# Patient Record
Sex: FEMALE | Race: WHITE | NOT HISPANIC OR LATINO | Employment: FULL TIME | URBAN - NONMETROPOLITAN AREA
[De-identification: names, ages, dates, MRNs, and addresses within clinical notes are randomized per-mention and may not be internally consistent; named-entity substitution may affect disease eponyms.]

---

## 2020-04-30 ENCOUNTER — HOSPITAL ENCOUNTER (INPATIENT)
Facility: HOSPITAL | Age: 35
LOS: 2 days | Discharge: HOME | DRG: 080 | End: 2020-05-02
Attending: EMERGENCY MEDICINE | Admitting: FAMILY MEDICINE
Payer: OTHER MISCELLANEOUS

## 2020-04-30 ENCOUNTER — APPOINTMENT (EMERGENCY)
Dept: CT IMAGING | Facility: HOSPITAL | Age: 35
DRG: 080 | End: 2020-04-30
Payer: OTHER MISCELLANEOUS

## 2020-04-30 DIAGNOSIS — J18.9 MULTIFOCAL PNEUMONIA: Primary | ICD-10-CM

## 2020-04-30 DIAGNOSIS — U07.1 COVID-19 VIRUS INFECTION: ICD-10-CM

## 2020-04-30 DIAGNOSIS — J45.20 MILD INTERMITTENT ASTHMA WITHOUT COMPLICATION: ICD-10-CM

## 2020-04-30 DIAGNOSIS — U07.1 PNEUMONIA DUE TO COVID-19 VIRUS: ICD-10-CM

## 2020-04-30 DIAGNOSIS — R06.02 SOB (SHORTNESS OF BREATH): ICD-10-CM

## 2020-04-30 DIAGNOSIS — J12.82 PNEUMONIA DUE TO COVID-19 VIRUS: ICD-10-CM

## 2020-04-30 PROBLEM — E28.2 PCOS (POLYCYSTIC OVARIAN SYNDROME): Status: ACTIVE | Noted: 2020-04-30

## 2020-04-30 PROBLEM — E66.01 MORBID OBESITY (HCC): Status: ACTIVE | Noted: 2020-04-30

## 2020-04-30 PROBLEM — Z86.711 HISTORY OF PULMONARY EMBOLISM: Status: ACTIVE | Noted: 2020-04-30

## 2020-04-30 PROBLEM — I10 ESSENTIAL HYPERTENSION: Status: ACTIVE | Noted: 2020-04-30

## 2020-04-30 LAB
ABO GROUP BLD: NORMAL
ABO GROUP BLD: NORMAL
ALBUMIN SERPL BCP-MCNC: 3.4 G/DL (ref 3.5–5)
ALP SERPL-CCNC: 58 U/L (ref 46–116)
ALT SERPL W P-5'-P-CCNC: 41 U/L (ref 12–78)
ANION GAP SERPL CALCULATED.3IONS-SCNC: 8 MMOL/L (ref 4–13)
AST SERPL W P-5'-P-CCNC: 34 U/L (ref 5–45)
ATRIAL RATE: 92 BPM
BASE EX.OXY STD BLDV CALC-SCNC: 57.6 % (ref 60–80)
BASE EXCESS BLDV CALC-SCNC: -0.8 MMOL/L
BASOPHILS # BLD MANUAL: 0 THOUSAND/UL (ref 0–0.1)
BASOPHILS NFR MAR MANUAL: 0 % (ref 0–1)
BILIRUB SERPL-MCNC: 0.18 MG/DL (ref 0.2–1)
BLD GP AB SCN SERPL QL: NEGATIVE
BUN SERPL-MCNC: 10 MG/DL (ref 5–25)
CALCIUM SERPL-MCNC: 8.3 MG/DL (ref 8.3–10.1)
CHLORIDE SERPL-SCNC: 103 MMOL/L (ref 100–108)
CO2 SERPL-SCNC: 28 MMOL/L (ref 21–32)
CREAT SERPL-MCNC: 1.04 MG/DL (ref 0.6–1.3)
D DIMER PPP FEU-MCNC: 0.6 UG/ML FEU
EOSINOPHIL # BLD MANUAL: 0.05 THOUSAND/UL (ref 0–0.4)
EOSINOPHIL NFR BLD MANUAL: 1 % (ref 0–6)
ERYTHROCYTE [DISTWIDTH] IN BLOOD BY AUTOMATED COUNT: 13.6 % (ref 11.6–15.1)
GFR SERPL CREATININE-BSD FRML MDRD: 70 ML/MIN/1.73SQ M
GLUCOSE SERPL-MCNC: 84 MG/DL (ref 65–140)
HCG SERPL QL: NEGATIVE
HCO3 BLDV-SCNC: 24.2 MMOL/L (ref 24–30)
HCT VFR BLD AUTO: 47.7 % (ref 34.8–46.1)
HGB BLD-MCNC: 15.4 G/DL (ref 11.5–15.4)
LYMPHOCYTES # BLD AUTO: 2.04 THOUSAND/UL (ref 0.6–4.47)
LYMPHOCYTES # BLD AUTO: 40 % (ref 14–44)
MAGNESIUM SERPL-MCNC: 2.1 MG/DL (ref 1.6–2.6)
MCH RBC QN AUTO: 26.8 PG (ref 26.8–34.3)
MCHC RBC AUTO-ENTMCNC: 32.3 G/DL (ref 31.4–37.4)
MCV RBC AUTO: 83 FL (ref 82–98)
MONOCYTES # BLD AUTO: 0.51 THOUSAND/UL (ref 0–1.22)
MONOCYTES NFR BLD: 10 % (ref 4–12)
NEUTROPHILS # BLD MANUAL: 1.93 THOUSAND/UL (ref 1.85–7.62)
NEUTS SEG NFR BLD AUTO: 38 % (ref 43–75)
NRBC BLD AUTO-RTO: 0 /100 WBCS
O2 CT BLDV-SCNC: 12.9 ML/DL
P AXIS: 49 DEGREES
PCO2 BLDV: 41.5 MM HG (ref 42–50)
PH BLDV: 7.38 [PH] (ref 7.3–7.4)
PLATELET # BLD AUTO: 238 THOUSANDS/UL (ref 149–390)
PLATELET BLD QL SMEAR: ADEQUATE
PMV BLD AUTO: 9.7 FL (ref 8.9–12.7)
PO2 BLDV: 29.7 MM HG (ref 35–45)
POTASSIUM SERPL-SCNC: 3.9 MMOL/L (ref 3.5–5.3)
PR INTERVAL: 134 MS
PROT SERPL-MCNC: 8.2 G/DL (ref 6.4–8.2)
QRS AXIS: 20 DEGREES
QRSD INTERVAL: 84 MS
QT INTERVAL: 388 MS
QTC INTERVAL: 479 MS
RBC # BLD AUTO: 5.74 MILLION/UL (ref 3.81–5.12)
RBC MORPH BLD: NORMAL
RH BLD: POSITIVE
RH BLD: POSITIVE
SODIUM SERPL-SCNC: 139 MMOL/L (ref 136–145)
SPECIMEN EXPIRATION DATE: NORMAL
T WAVE AXIS: 18 DEGREES
TOTAL CELLS COUNTED SPEC: 100
TROPONIN I SERPL-MCNC: <0.02 NG/ML
VARIANT LYMPHS # BLD AUTO: 11 %
VENTRICULAR RATE: 92 BPM
WBC # BLD AUTO: 5.09 THOUSAND/UL (ref 4.31–10.16)

## 2020-04-30 PROCEDURE — 83520 IMMUNOASSAY QUANT NOS NONAB: CPT | Performed by: FAMILY MEDICINE

## 2020-04-30 PROCEDURE — 84145 PROCALCITONIN (PCT): CPT | Performed by: FAMILY MEDICINE

## 2020-04-30 PROCEDURE — 85027 COMPLETE CBC AUTOMATED: CPT | Performed by: PHYSICIAN ASSISTANT

## 2020-04-30 PROCEDURE — 84484 ASSAY OF TROPONIN QUANT: CPT | Performed by: PHYSICIAN ASSISTANT

## 2020-04-30 PROCEDURE — 86900 BLOOD TYPING SEROLOGIC ABO: CPT | Performed by: FAMILY MEDICINE

## 2020-04-30 PROCEDURE — 87040 BLOOD CULTURE FOR BACTERIA: CPT | Performed by: FAMILY MEDICINE

## 2020-04-30 PROCEDURE — 99285 EMERGENCY DEPT VISIT HI MDM: CPT

## 2020-04-30 PROCEDURE — 94760 N-INVAS EAR/PLS OXIMETRY 1: CPT

## 2020-04-30 PROCEDURE — 85007 BL SMEAR W/DIFF WBC COUNT: CPT | Performed by: PHYSICIAN ASSISTANT

## 2020-04-30 PROCEDURE — 93005 ELECTROCARDIOGRAM TRACING: CPT

## 2020-04-30 PROCEDURE — 71275 CT ANGIOGRAPHY CHEST: CPT

## 2020-04-30 PROCEDURE — 96360 HYDRATION IV INFUSION INIT: CPT

## 2020-04-30 PROCEDURE — 99223 1ST HOSP IP/OBS HIGH 75: CPT | Performed by: FAMILY MEDICINE

## 2020-04-30 PROCEDURE — 87449 NOS EACH ORGANISM AG IA: CPT | Performed by: FAMILY MEDICINE

## 2020-04-30 PROCEDURE — 82805 BLOOD GASES W/O2 SATURATION: CPT | Performed by: PHYSICIAN ASSISTANT

## 2020-04-30 PROCEDURE — 99285 EMERGENCY DEPT VISIT HI MDM: CPT | Performed by: PHYSICIAN ASSISTANT

## 2020-04-30 PROCEDURE — 93010 ELECTROCARDIOGRAM REPORT: CPT | Performed by: INTERNAL MEDICINE

## 2020-04-30 PROCEDURE — 85379 FIBRIN DEGRADATION QUANT: CPT | Performed by: PHYSICIAN ASSISTANT

## 2020-04-30 PROCEDURE — 86850 RBC ANTIBODY SCREEN: CPT | Performed by: FAMILY MEDICINE

## 2020-04-30 PROCEDURE — 80053 COMPREHEN METABOLIC PANEL: CPT | Performed by: PHYSICIAN ASSISTANT

## 2020-04-30 PROCEDURE — 84703 CHORIONIC GONADOTROPIN ASSAY: CPT | Performed by: PHYSICIAN ASSISTANT

## 2020-04-30 PROCEDURE — 36415 COLL VENOUS BLD VENIPUNCTURE: CPT | Performed by: PHYSICIAN ASSISTANT

## 2020-04-30 PROCEDURE — 94640 AIRWAY INHALATION TREATMENT: CPT

## 2020-04-30 PROCEDURE — 83735 ASSAY OF MAGNESIUM: CPT | Performed by: FAMILY MEDICINE

## 2020-04-30 PROCEDURE — 86901 BLOOD TYPING SEROLOGIC RH(D): CPT | Performed by: FAMILY MEDICINE

## 2020-04-30 RX ORDER — ALBUTEROL SULFATE 90 UG/1
2 AEROSOL, METERED RESPIRATORY (INHALATION) EVERY 6 HOURS PRN
Status: DISCONTINUED | OUTPATIENT
Start: 2020-04-30 | End: 2020-04-30

## 2020-04-30 RX ORDER — OMEGA-3S/DHA/EPA/FISH OIL/D3 300MG-1000
400 CAPSULE ORAL DAILY
Status: DISCONTINUED | OUTPATIENT
Start: 2020-05-01 | End: 2020-04-30

## 2020-04-30 RX ORDER — HYDROXYCHLOROQUINE SULFATE 200 MG/1
200 TABLET, FILM COATED ORAL EVERY 12 HOURS
Status: DISCONTINUED | OUTPATIENT
Start: 2020-05-01 | End: 2020-05-02 | Stop reason: HOSPADM

## 2020-04-30 RX ORDER — MELATONIN
2000 DAILY
Status: DISCONTINUED | OUTPATIENT
Start: 2020-05-01 | End: 2020-05-02 | Stop reason: HOSPADM

## 2020-04-30 RX ORDER — GUAIFENESIN 600 MG
600 TABLET, EXTENDED RELEASE 12 HR ORAL 2 TIMES DAILY
Status: DISCONTINUED | OUTPATIENT
Start: 2020-04-30 | End: 2020-05-02 | Stop reason: HOSPADM

## 2020-04-30 RX ORDER — ONDANSETRON 2 MG/ML
4 INJECTION INTRAMUSCULAR; INTRAVENOUS EVERY 6 HOURS PRN
Status: DISCONTINUED | OUTPATIENT
Start: 2020-04-30 | End: 2020-05-02 | Stop reason: HOSPADM

## 2020-04-30 RX ORDER — HYDROXYZINE HYDROCHLORIDE 25 MG/1
TABLET, FILM COATED ORAL
COMMUNITY
Start: 2011-03-01

## 2020-04-30 RX ORDER — MAGNESIUM HYDROXIDE/ALUMINUM HYDROXICE/SIMETHICONE 120; 1200; 1200 MG/30ML; MG/30ML; MG/30ML
30 SUSPENSION ORAL EVERY 6 HOURS PRN
Status: DISCONTINUED | OUTPATIENT
Start: 2020-04-30 | End: 2020-05-02 | Stop reason: HOSPADM

## 2020-04-30 RX ORDER — DOXYCYCLINE HYCLATE 100 MG/1
100 CAPSULE ORAL EVERY 12 HOURS SCHEDULED
Status: DISCONTINUED | OUTPATIENT
Start: 2020-05-01 | End: 2020-05-01

## 2020-04-30 RX ORDER — CEFTRIAXONE 2 G/50ML
2000 INJECTION, SOLUTION INTRAVENOUS ONCE
Status: COMPLETED | OUTPATIENT
Start: 2020-04-30 | End: 2020-04-30

## 2020-04-30 RX ORDER — CLINDAMYCIN PHOSPHATE 10 UG/ML
LOTION TOPICAL
COMMUNITY
Start: 2019-09-03 | End: 2020-05-02 | Stop reason: HOSPADM

## 2020-04-30 RX ORDER — ALBUTEROL SULFATE 90 UG/1
2 AEROSOL, METERED RESPIRATORY (INHALATION) EVERY 4 HOURS PRN
Status: DISCONTINUED | OUTPATIENT
Start: 2020-04-30 | End: 2020-05-02 | Stop reason: HOSPADM

## 2020-04-30 RX ORDER — SENNOSIDES 8.6 MG
1 TABLET ORAL DAILY
Status: DISCONTINUED | OUTPATIENT
Start: 2020-05-01 | End: 2020-05-02 | Stop reason: HOSPADM

## 2020-04-30 RX ORDER — ALBUTEROL SULFATE 90 UG/1
AEROSOL, METERED RESPIRATORY (INHALATION)
Status: ON HOLD | COMMUNITY
Start: 2016-04-11 | End: 2020-05-02 | Stop reason: SDUPTHER

## 2020-04-30 RX ORDER — CEFTRIAXONE 1 G/50ML
1000 INJECTION, SOLUTION INTRAVENOUS EVERY 24 HOURS
Status: DISCONTINUED | OUTPATIENT
Start: 2020-05-01 | End: 2020-05-01

## 2020-04-30 RX ORDER — LISINOPRIL 10 MG/1
TABLET ORAL
COMMUNITY
Start: 2011-03-01

## 2020-04-30 RX ORDER — FLUOXETINE 20 MG/1
10 TABLET, FILM COATED ORAL
COMMUNITY
Start: 2019-08-23

## 2020-04-30 RX ORDER — IPRATROPIUM BROMIDE AND ALBUTEROL SULFATE 2.5; .5 MG/3ML; MG/3ML
3 SOLUTION RESPIRATORY (INHALATION) ONCE
Status: COMPLETED | OUTPATIENT
Start: 2020-04-30 | End: 2020-04-30

## 2020-04-30 RX ORDER — LISINOPRIL 10 MG/1
10 TABLET ORAL DAILY
Status: DISCONTINUED | OUTPATIENT
Start: 2020-05-01 | End: 2020-05-02 | Stop reason: HOSPADM

## 2020-04-30 RX ORDER — DOCUSATE SODIUM 100 MG/1
100 CAPSULE, LIQUID FILLED ORAL 2 TIMES DAILY
Status: DISCONTINUED | OUTPATIENT
Start: 2020-04-30 | End: 2020-05-02 | Stop reason: HOSPADM

## 2020-04-30 RX ORDER — AMITRIPTYLINE HYDROCHLORIDE 25 MG/1
TABLET, FILM COATED ORAL
COMMUNITY
Start: 2019-08-23

## 2020-04-30 RX ORDER — ACETAMINOPHEN 325 MG/1
650 TABLET ORAL EVERY 6 HOURS PRN
Status: DISCONTINUED | OUTPATIENT
Start: 2020-04-30 | End: 2020-05-02 | Stop reason: HOSPADM

## 2020-04-30 RX ORDER — HYDROXYCHLOROQUINE SULFATE 200 MG/1
800 TABLET, FILM COATED ORAL 2 TIMES DAILY
Status: COMPLETED | OUTPATIENT
Start: 2020-04-30 | End: 2020-05-01

## 2020-04-30 RX ADMIN — IOHEXOL 85 ML: 350 INJECTION, SOLUTION INTRAVENOUS at 15:18

## 2020-04-30 RX ADMIN — HYDROXYCHLOROQUINE SULFATE 800 MG: 200 TABLET, FILM COATED ORAL at 20:04

## 2020-04-30 RX ADMIN — ALBUTEROL SULFATE 2 PUFF: 90 AEROSOL, METERED RESPIRATORY (INHALATION) at 21:54

## 2020-04-30 RX ADMIN — IPRATROPIUM BROMIDE AND ALBUTEROL SULFATE 3 ML: 2.5; .5 SOLUTION RESPIRATORY (INHALATION) at 14:31

## 2020-04-30 RX ADMIN — GUAIFENESIN 600 MG: 600 TABLET, EXTENDED RELEASE ORAL at 20:05

## 2020-04-30 RX ADMIN — DOXYCYCLINE 100 MG: 100 INJECTION, POWDER, LYOPHILIZED, FOR SOLUTION INTRAVENOUS at 16:37

## 2020-04-30 RX ADMIN — SODIUM CHLORIDE 1000 ML: 0.9 INJECTION, SOLUTION INTRAVENOUS at 14:22

## 2020-04-30 RX ADMIN — CEFTRIAXONE 2000 MG: 2 INJECTION, SOLUTION INTRAVENOUS at 15:59

## 2020-05-01 LAB
ALBUMIN SERPL BCP-MCNC: 3.1 G/DL (ref 3.5–5)
ALP SERPL-CCNC: 52 U/L (ref 46–116)
ALT SERPL W P-5'-P-CCNC: 36 U/L (ref 12–78)
ANION GAP SERPL CALCULATED.3IONS-SCNC: 10 MMOL/L (ref 4–13)
AST SERPL W P-5'-P-CCNC: 34 U/L (ref 5–45)
BILIRUB SERPL-MCNC: 0.21 MG/DL (ref 0.2–1)
BUN SERPL-MCNC: 9 MG/DL (ref 5–25)
CALCIUM SERPL-MCNC: 7.9 MG/DL (ref 8.3–10.1)
CHLORIDE SERPL-SCNC: 104 MMOL/L (ref 100–108)
CO2 SERPL-SCNC: 25 MMOL/L (ref 21–32)
CREAT SERPL-MCNC: 1.02 MG/DL (ref 0.6–1.3)
ERYTHROCYTE [DISTWIDTH] IN BLOOD BY AUTOMATED COUNT: 13.5 % (ref 11.6–15.1)
GFR SERPL CREATININE-BSD FRML MDRD: 71 ML/MIN/1.73SQ M
GLUCOSE SERPL-MCNC: 69 MG/DL (ref 65–140)
GLUCOSE SERPL-MCNC: 75 MG/DL (ref 65–140)
HCT VFR BLD AUTO: 44.8 % (ref 34.8–46.1)
HGB BLD-MCNC: 14.5 G/DL (ref 11.5–15.4)
L PNEUMO1 AG UR QL IA.RAPID: NEGATIVE
MCH RBC QN AUTO: 27 PG (ref 26.8–34.3)
MCHC RBC AUTO-ENTMCNC: 32.4 G/DL (ref 31.4–37.4)
MCV RBC AUTO: 83 FL (ref 82–98)
NRBC BLD AUTO-RTO: 0 /100 WBCS
PLATELET # BLD AUTO: 223 THOUSANDS/UL (ref 149–390)
PMV BLD AUTO: 9.5 FL (ref 8.9–12.7)
POTASSIUM SERPL-SCNC: 3.5 MMOL/L (ref 3.5–5.3)
PROCALCITONIN SERPL-MCNC: <0.05 NG/ML
PROCALCITONIN SERPL-MCNC: <0.05 NG/ML
PROT SERPL-MCNC: 7.4 G/DL (ref 6.4–8.2)
RBC # BLD AUTO: 5.37 MILLION/UL (ref 3.81–5.12)
S PNEUM AG UR QL: NEGATIVE
SODIUM SERPL-SCNC: 139 MMOL/L (ref 136–145)
WBC # BLD AUTO: 4.96 THOUSAND/UL (ref 4.31–10.16)

## 2020-05-01 PROCEDURE — 80053 COMPREHEN METABOLIC PANEL: CPT | Performed by: FAMILY MEDICINE

## 2020-05-01 PROCEDURE — 94760 N-INVAS EAR/PLS OXIMETRY 1: CPT

## 2020-05-01 PROCEDURE — 82948 REAGENT STRIP/BLOOD GLUCOSE: CPT

## 2020-05-01 PROCEDURE — 99232 SBSQ HOSP IP/OBS MODERATE 35: CPT | Performed by: FAMILY MEDICINE

## 2020-05-01 PROCEDURE — 84145 PROCALCITONIN (PCT): CPT | Performed by: FAMILY MEDICINE

## 2020-05-01 PROCEDURE — 85027 COMPLETE CBC AUTOMATED: CPT | Performed by: FAMILY MEDICINE

## 2020-05-01 RX ADMIN — ENOXAPARIN SODIUM 40 MG: 40 INJECTION SUBCUTANEOUS at 21:29

## 2020-05-01 RX ADMIN — MELATONIN 2000 UNITS: at 09:27

## 2020-05-01 RX ADMIN — ALBUTEROL SULFATE 2 PUFF: 90 AEROSOL, METERED RESPIRATORY (INHALATION) at 13:34

## 2020-05-01 RX ADMIN — GUAIFENESIN 600 MG: 600 TABLET, EXTENDED RELEASE ORAL at 17:33

## 2020-05-01 RX ADMIN — ALBUTEROL SULFATE 2 PUFF: 90 AEROSOL, METERED RESPIRATORY (INHALATION) at 04:32

## 2020-05-01 RX ADMIN — HYDROXYCHLOROQUINE SULFATE 800 MG: 200 TABLET, FILM COATED ORAL at 09:26

## 2020-05-01 RX ADMIN — LISINOPRIL 10 MG: 10 TABLET ORAL at 09:28

## 2020-05-01 RX ADMIN — ENOXAPARIN SODIUM 40 MG: 40 INJECTION SUBCUTANEOUS at 09:26

## 2020-05-01 RX ADMIN — GUAIFENESIN 600 MG: 600 TABLET, EXTENDED RELEASE ORAL at 09:28

## 2020-05-01 RX ADMIN — HYDROXYCHLOROQUINE SULFATE 200 MG: 200 TABLET, FILM COATED ORAL at 21:29

## 2020-05-01 RX ADMIN — DOXYCYCLINE 100 MG: 100 CAPSULE ORAL at 09:27

## 2020-05-02 ENCOUNTER — APPOINTMENT (INPATIENT)
Dept: RADIOLOGY | Facility: HOSPITAL | Age: 35
DRG: 080 | End: 2020-05-02
Payer: OTHER MISCELLANEOUS

## 2020-05-02 VITALS
BODY MASS INDEX: 41.02 KG/M2 | HEART RATE: 96 BPM | WEIGHT: 293 LBS | TEMPERATURE: 98.6 F | OXYGEN SATURATION: 95 % | HEIGHT: 71 IN | RESPIRATION RATE: 17 BRPM | SYSTOLIC BLOOD PRESSURE: 118 MMHG | DIASTOLIC BLOOD PRESSURE: 65 MMHG

## 2020-05-02 LAB
ALBUMIN SERPL BCP-MCNC: 3.4 G/DL (ref 3.5–5)
ALP SERPL-CCNC: 53 U/L (ref 46–116)
ALT SERPL W P-5'-P-CCNC: 40 U/L (ref 12–78)
ANION GAP SERPL CALCULATED.3IONS-SCNC: 8 MMOL/L (ref 4–13)
AST SERPL W P-5'-P-CCNC: 33 U/L (ref 5–45)
BILIRUB SERPL-MCNC: 0.27 MG/DL (ref 0.2–1)
BUN SERPL-MCNC: 10 MG/DL (ref 5–25)
CALCIUM SERPL-MCNC: 8.4 MG/DL (ref 8.3–10.1)
CHLORIDE SERPL-SCNC: 104 MMOL/L (ref 100–108)
CO2 SERPL-SCNC: 26 MMOL/L (ref 21–32)
CREAT SERPL-MCNC: 0.99 MG/DL (ref 0.6–1.3)
ERYTHROCYTE [DISTWIDTH] IN BLOOD BY AUTOMATED COUNT: 13.4 % (ref 11.6–15.1)
GFR SERPL CREATININE-BSD FRML MDRD: 74 ML/MIN/1.73SQ M
GLUCOSE SERPL-MCNC: 84 MG/DL (ref 65–140)
HCT VFR BLD AUTO: 46 % (ref 34.8–46.1)
HGB BLD-MCNC: 15.1 G/DL (ref 11.5–15.4)
MCH RBC QN AUTO: 27 PG (ref 26.8–34.3)
MCHC RBC AUTO-ENTMCNC: 32.8 G/DL (ref 31.4–37.4)
MCV RBC AUTO: 82 FL (ref 82–98)
NRBC BLD AUTO-RTO: 0 /100 WBCS
PLATELET # BLD AUTO: 243 THOUSANDS/UL (ref 149–390)
PMV BLD AUTO: 9.6 FL (ref 8.9–12.7)
POTASSIUM SERPL-SCNC: 3.7 MMOL/L (ref 3.5–5.3)
PROT SERPL-MCNC: 7.8 G/DL (ref 6.4–8.2)
RBC # BLD AUTO: 5.6 MILLION/UL (ref 3.81–5.12)
SODIUM SERPL-SCNC: 138 MMOL/L (ref 136–145)
WBC # BLD AUTO: 5.61 THOUSAND/UL (ref 4.31–10.16)

## 2020-05-02 PROCEDURE — 93005 ELECTROCARDIOGRAM TRACING: CPT

## 2020-05-02 PROCEDURE — 71045 X-RAY EXAM CHEST 1 VIEW: CPT

## 2020-05-02 PROCEDURE — 94760 N-INVAS EAR/PLS OXIMETRY 1: CPT

## 2020-05-02 PROCEDURE — 85027 COMPLETE CBC AUTOMATED: CPT | Performed by: FAMILY MEDICINE

## 2020-05-02 PROCEDURE — 99239 HOSP IP/OBS DSCHRG MGMT >30: CPT | Performed by: FAMILY MEDICINE

## 2020-05-02 PROCEDURE — 80053 COMPREHEN METABOLIC PANEL: CPT | Performed by: FAMILY MEDICINE

## 2020-05-02 RX ORDER — MELATONIN
2000 DAILY
Qty: 10 TABLET | Refills: 0 | Status: SHIPPED | OUTPATIENT
Start: 2020-05-03 | End: 2020-05-08

## 2020-05-02 RX ORDER — ALBUTEROL SULFATE 90 UG/1
2 AEROSOL, METERED RESPIRATORY (INHALATION) 4 TIMES DAILY
Status: DISCONTINUED | OUTPATIENT
Start: 2020-05-02 | End: 2020-05-02 | Stop reason: HOSPADM

## 2020-05-02 RX ORDER — GUAIFENESIN 600 MG
600 TABLET, EXTENDED RELEASE 12 HR ORAL 2 TIMES DAILY
Qty: 14 TABLET | Refills: 0 | Status: SHIPPED | OUTPATIENT
Start: 2020-05-02 | End: 2020-05-09

## 2020-05-02 RX ORDER — ALBUTEROL SULFATE 90 UG/1
2 AEROSOL, METERED RESPIRATORY (INHALATION) EVERY 4 HOURS PRN
Qty: 1 INHALER | Refills: 1 | Status: SHIPPED | OUTPATIENT
Start: 2020-05-02

## 2020-05-02 RX ADMIN — ENOXAPARIN SODIUM 40 MG: 40 INJECTION SUBCUTANEOUS at 09:16

## 2020-05-02 RX ADMIN — LISINOPRIL 10 MG: 10 TABLET ORAL at 09:07

## 2020-05-02 RX ADMIN — GUAIFENESIN 600 MG: 600 TABLET, EXTENDED RELEASE ORAL at 09:17

## 2020-05-02 RX ADMIN — MELATONIN 2000 UNITS: at 12:48

## 2020-05-02 RX ADMIN — HYDROXYCHLOROQUINE SULFATE 200 MG: 200 TABLET, FILM COATED ORAL at 09:09

## 2020-05-02 RX ADMIN — ALBUTEROL SULFATE 2 PUFF: 90 AEROSOL, METERED RESPIRATORY (INHALATION) at 12:47

## 2020-05-02 RX ADMIN — ALBUTEROL SULFATE 2 PUFF: 90 AEROSOL, METERED RESPIRATORY (INHALATION) at 09:11

## 2020-05-03 LAB — IL6 SERPL-MCNC: 5.5 PG/ML (ref 0–15.5)

## 2020-05-04 LAB
ATRIAL RATE: 73 BPM
P AXIS: 76 DEGREES
PR INTERVAL: 144 MS
QRS AXIS: 58 DEGREES
QRSD INTERVAL: 78 MS
QT INTERVAL: 402 MS
QTC INTERVAL: 442 MS
T WAVE AXIS: 73 DEGREES
VENTRICULAR RATE: 73 BPM

## 2020-05-06 LAB
BACTERIA BLD CULT: NORMAL
BACTERIA BLD CULT: NORMAL

## 2022-02-03 ENCOUNTER — NURSE TRIAGE (OUTPATIENT)
Dept: OTHER | Facility: OTHER | Age: 37
End: 2022-02-03

## 2022-02-03 DIAGNOSIS — Z20.822 SUSPECTED SEVERE ACUTE RESPIRATORY SYNDROME CORONAVIRUS 2 (SARS-COV-2) INFECTION: Primary | ICD-10-CM

## 2022-02-03 PROCEDURE — U0003 INFECTIOUS AGENT DETECTION BY NUCLEIC ACID (DNA OR RNA); SEVERE ACUTE RESPIRATORY SYNDROME CORONAVIRUS 2 (SARS-COV-2) (CORONAVIRUS DISEASE [COVID-19]), AMPLIFIED PROBE TECHNIQUE, MAKING USE OF HIGH THROUGHPUT TECHNOLOGIES AS DESCRIBED BY CMS-2020-01-R: HCPCS | Performed by: FAMILY MEDICINE

## 2022-02-03 PROCEDURE — U0005 INFEC AGEN DETEC AMPLI PROBE: HCPCS | Performed by: FAMILY MEDICINE

## 2022-02-03 NOTE — TELEPHONE ENCOUNTER
1  Were you within 6 feet or less, for up to 15 minutes or more with a person that has a confirmed COVID-19 test? Yes  2  What was the date of your exposure? 2 weeks ago  3  Are you experiencing any symptoms attributed to the virus?  (Assess for SOB, cough, fever, difficulty breathing) Sore throat, headache, onset 1/28/22  4  HIGH RISK: Do you have any history heart or lung conditions, weakened immune system, diabetes, Asthma, CHF, HIV, COPD, Chemo, renal failure, sickle cell, etc? Asthma   5  PREGNANCY: Are you pregnant or did you recently give birth? Denies   6   VACCINE: "Have you gotten the COVID-19 vaccine?" If Yes ask: "Which one, how many shots, when did you get it?" 3

## 2022-02-03 NOTE — TELEPHONE ENCOUNTER
Reason for Disposition   [1] COVID-19 infection suspected by caller or triager AND [2] mild symptoms (cough, fever, or others) AND [3] negative COVID-19 rapid test    Protocols used: CORONAVIRUS (COVID-19) DIAGNOSED OR SUSPECTED-ADULT-[place]

## 2022-02-04 LAB — SARS-COV-2 RNA RESP QL NAA+PROBE: POSITIVE

## 2022-08-17 NOTE — PROGRESS NOTES
Assessment        Diagnoses and all orders for this visit:    Encntr for gyn exam (general) (routine) w/o abn findings    Cervical cancer screening  -     Liquid-based pap, screening    PCOS (polycystic ovarian syndrome)  -     Follicle stimulating hormone; Future  -     DHEA-sulfate; Future  -     hCG, quantitative; Future  -     Prolactin; Future  -     Testosterone, free, total; Future  -     TSH, 3rd generation with Free T4 reflex; Future  -     17-Hydroxyprogesterone; Future  -     US pelvis complete w transvaginal; Future     Advised repeat workup for PCOS to confirm diagnosis  Patient also with history of  endometriosis and is complaining irregular and painful menses  Pelvic ultrasound  was ordered     She previously had a Mirena Intrauterine device that helped control her symptoms  of abnormal bleeding and pain  Discussed this may be another option until she is  ready to pursue pregnancy since return to fertility is immediate      History of pulmonary embolism  -     Thrombosis Panel; Future     Patient with history of pulmonary embolism  Patient denies a history of  thrombophilia but her father has been diagnosed with thrombotic events  Advised thrombophilia panel    Encounter for preconception consultation  -     Ambulatory Referral to Maternal Fetal Medicine; Future      Patient is considering pregnancy although without a current partner  Advised  another pre conception counseling with Maternal Fetal Medicine  She does have  risk factors such as advanced maternal age as well as history of pulmonary  embolism      Encounter for assisted reproductive fertility cycle  -     Ambulatory Referral to Infertility; Future     She is considering her options for pregnancy  She is currently without a partner     Advised patient referral to Reproductive Endocrinology and Infertility to discuss  options for pursuing pregnancy via assisted reproductive technology    Family history of breast cancer  -     Mammo diagnostic bilateral w 3d & cad; Future  -     US breast left limited (diagnostic); Future     Small breast masses were noted at 8:00 a m  Position  Advised diagnostic  imaging  Advised additional testing if indicated  We will call patient with results    Encounter for screening mammogram for breast cancer  -     Mammo diagnostic bilateral w 3d & cad; Future  -     US breast left limited (diagnostic); Future    Mass of lower inner quadrant of left breast  -     Mammo diagnostic bilateral w 3d & cad; Future  -     US breast left limited (diagnostic); Future    Other orders  -     busPIRone (BUSPAR) 10 mg tablet  -     fenofibrate (TRICOR) 145 mg tablet; Take 1 tablet by mouth daily  -     levocetirizine (XYZAL) 5 MG tablet; Daily (Patient not taking: Reported on 8/18/2022)  -     montelukast (SINGULAIR) 10 mg tablet  -     omeprazole (PriLOSEC) 40 MG capsule  -     Semaglutide-[facility]) 2 4 MG/0 75ML; 2 4 mg  -     spironolactone (ALDACTONE) 10 mg/ml SUSP oral suspension; 1 5 mg/kg  -     Topiramate (TOPAMAX PO); 50 mg 2 (two) times a day  -     loratadine (CLARITIN) 10 mg tablet; Take 10 mg by mouth daily             Plan      All questions answered  Await pap smear results  Contraception: none  Diagnosis explained in detail, including differential   Discussed healthy lifestyle modifications  Educational material distributed  Follow up in 3 weeks  Follow up as needed  Mammogram   Pelvic ultrasound  Thin prep Pap smear  Vivienne Talbot is a 44 y o  female who presents for annual exam       Chief Complaint   Patient presents with    New Patient Visit    Gynecologic Exam     Yearly exam - has hx of PCOS, endometriosis, fertility discussion, irregular periods within the past 3 months        She is not sexually active currently, no current partner  Declines STD screening    She has a h/o PCOS, insulin resistance, endometriosis ( 3 laparoscopies, due to dysmenorrhea)  Dx with labs, US, insulin resistance    Last Pap: 2 years ago [facility]   Last mammogram:   none  Colorectal cancer screening: yes, [place], 2016    HPV vaccine completed:yes   Current contraception: none  History of abnormal Pap smear: no  History of abnormal mammogram: no  Family history of uterine or ovarian cancer: no  Family history of breast cancer: yes - MGM 76s  Family history of colon cancer: no    She is on Wegovy, has lost 80+ lbs managed by [facility] [place]      Menstrual History:  OB History        0    Para   0    Term   0       0    AB   0    Living   0       SAB   0    IAB   0    Ectopic   0    Multiple   0    Live Births   0                Menarche age: 14  Patient's last menstrual period was 2022 (exact date)  Period Cycle (Days): 55  Period Pattern: (!) Irregular    She had a Mirena removed in   Periods were every 24 days, periods are now irregular    She had "bad cramps"    She has a h/o PE in  on OCP (Annell Salon), thrombophilia panel not done  She was on progesterone only, did well on Progesterone IUD    She wants to try to get pregnant        Past Medical History:   Diagnosis Date    Asthma     GERD (gastroesophageal reflux disease)     Hypertension     Migraines     PCOS (polycystic ovarian syndrome)     Pulmonary embolism ([facility] [address] ) 2009    Venous insufficiency      Past Surgical History:   Procedure Laterality Date    ADENOIDECTOMY      CHOLECYSTECTOMY      LAPAROSCOPY      TONSILLECTOMY       History reviewed  No pertinent family history      Social History     Tobacco Use    Smoking status: Never Smoker    Smokeless tobacco: Never Used   Vaping Use    Vaping Use: Never used   Substance Use Topics    Alcohol use: Not Currently    Drug use: Never          Current Outpatient Medications:     albuterol (Ventolin HFA) 90 mcg/act inhaler, Inhale 2 puffs every 4 (four) hours as needed for wheezing or shortness of breath, Disp: 1 Inhaler, Rfl: 1    busPIRone (BUSPAR) 10 mg tablet, , Disp: , Rfl:     fenofibrate (TRICOR) 145 mg tablet, Take 1 tablet by mouth daily, Disp: , Rfl:     FLUoxetine (PROzac) 20 MG tablet, 10 mg, Disp: , Rfl:     hydrOXYzine HCL (ATARAX) 25 mg tablet, Take by mouth, Disp: , Rfl:     lisinopril (ZESTRIL) 10 mg tablet, Take by mouth, Disp: , Rfl:     loratadine (CLARITIN) 10 mg tablet, Take 10 mg by mouth daily, Disp: , Rfl:     metFORMIN (GLUCOPHAGE) 1000 MG tablet, Take by mouth 2 (two) times a day with meals, Disp: , Rfl:     montelukast (SINGULAIR) 10 mg tablet, , Disp: , Rfl:     omeprazole (PriLOSEC) 40 MG capsule, , Disp: , Rfl:     Semaglutide-Weight Management [facility]) 2 4 MG/0 75ML, 2 4 mg, Disp: , Rfl:     spironolactone (ALDACTONE) 10 mg/ml SUSP oral suspension, 1 5 mg/kg, Disp: , Rfl:     Topiramate (TOPAMAX PO), 50 mg 2 (two) times a day, Disp: , Rfl:     levocetirizine (XYZAL) 5 MG tablet, Daily (Patient not taking: Reported on 8/18/2022), Disp: , Rfl:     Allergies   Allergen Reactions    Erythromycin Swelling    Ethylenediamine Dermatitis, Hives, Itching and Swelling    Propolis Dermatitis, Hives, Itching and Swelling    Triptans Palpitations     flushing           Review of Systems   Constitutional: Negative  HENT: Negative  Eyes: Negative  Respiratory: Negative  Cardiovascular: Negative  Gastrointestinal: Negative  Endocrine: Negative  Genitourinary:        As noted in HPI   Musculoskeletal: Negative  Skin: Negative  Allergic/Immunologic: Negative  Neurological: Negative  Hematological: Negative  Psychiatric/Behavioral: Negative  /82 (BP Location: Right arm, Patient Position: Sitting, Cuff Size: Adult)   Pulse 92   Ht 5' 11 5" (1 816 m)   Wt 98 9 kg (218 lb)   LMP 07/13/2022 (Exact Date)   BMI 29 98 kg/m²         Physical Exam  Constitutional:       Appearance: She is well-developed     Genitourinary:      Vulva, bladder and rectum normal       No lesions in the vagina  Genitourinary Comments:         Right Labia: No rash, tenderness, lesions, skin changes or Bartholin's cyst      Left Labia: No tenderness, lesions, skin changes, Bartholin's cyst or rash  No inguinal adenopathy present in the right or left side  No vaginal discharge, tenderness or bleeding  No vaginal prolapse present  No vaginal atrophy present  Right Adnexa: not tender, not full and no mass present  Left Adnexa: not tender, not full and no mass present  No cervical motion tenderness, friability, lesion or polyp  Uterus is not enlarged or tender  Pelvic exam was performed with patient in the lithotomy position  Rectum:      No external hemorrhoid  Breasts:      Right: No mass, nipple discharge, skin change or tenderness  Left: No mass, nipple discharge, skin change or tenderness  HENT:      Head: Normocephalic  Nose: Nose normal    Eyes:      Conjunctiva/sclera: Conjunctivae normal    Neck:      Thyroid: No thyromegaly  Cardiovascular:      Rate and Rhythm: Normal rate and regular rhythm  Heart sounds: Normal heart sounds  No murmur heard  Pulmonary:      Effort: Pulmonary effort is normal  No respiratory distress  Breath sounds: Normal breath sounds  No wheezing or rales  Abdominal:      General: There is no distension  Palpations: Abdomen is soft  There is no mass  Tenderness: There is no abdominal tenderness  There is no guarding or rebound  Musculoskeletal:         General: No tenderness  Cervical back: Neck supple  No muscular tenderness  Lymphadenopathy:      Cervical: No cervical adenopathy  Lower Body: No right inguinal adenopathy  No left inguinal adenopathy  Neurological:      Mental Status: She is alert and oriented to person, place, and time  Skin:     General: Skin is warm and dry     Psychiatric:         Mood and Affect: Mood normal  Behavior: Behavior normal

## 2022-08-17 NOTE — PATIENT INSTRUCTIONS
Wellness Visit for Adults   AMBULATORY CARE:   A wellness visit  is when you see your healthcare provider to get screened for health problems  Your healthcare provider will also give you advice on how to stay healthy  Write down your questions so you remember to ask them  Ask your healthcare provider how often you should have a wellness visit  What happens at a wellness visit:  Your healthcare provider will ask about your health, and your family history of health problems  This includes high blood pressure, heart disease, and cancer  He or she will ask if you have symptoms that concern you, if you smoke, and about your mood  You may also be asked about your intake of medicines, supplements, food, and alcohol  Any of the following may be done: Your weight  will be checked  Your height may also be checked so your body mass index (BMI) can be calculated  Your BMI shows if you are at a healthy weight  Your blood pressure  and heart rate will be checked  Your temperature may also be checked  Blood and urine tests  may be done  Blood tests may be done to check your cholesterol levels  Abnormal cholesterol levels increase your risk for heart disease and stroke  You may also need a blood or urine test to check for diabetes if you are at increased risk  Urine tests may be done to look for signs of an infection or kidney disease  A physical exam  includes checking your heartbeat and lungs with a stethoscope  Your healthcare provider may also check your skin to look for sun damage  Screening tests  may be recommended  A screening test is done to check for diseases that may not cause symptoms  The screening tests you may need depend on your age, gender, family history, and lifestyle habits  For example, colorectal screening may be recommended if you are 48years old or older  Screening tests you need if you are a woman:   A Pap smear  is used to screen for cervical cancer   Pap smears are usually done every 3 to 5 years depending on your age  You may need them more often if you have had abnormal Pap smear test results in the past  Ask your healthcare provider how often you should have a Pap smear  A mammogram  is an x-ray of your breasts to screen for breast cancer  Experts recommend mammograms every 2 years starting at age 48 years  You may need a mammogram at age 52 years or younger if you have an increased risk for breast cancer  Talk to your healthcare provider about when you should start having mammograms and how often you need them  Vaccines you may need:   Get an influenza vaccine  every year  The influenza vaccine protects you from the flu  Several types of viruses cause the flu  The viruses change over time, so new vaccines are made each year  Get a tetanus-diphtheria (Td) booster vaccine  every 10 years  This vaccine protects you against tetanus and diphtheria  Tetanus is a severe infection that may cause painful muscle spasms and lockjaw  Diphtheria is a severe bacterial infection that causes a thick covering in the back of your mouth and throat  Get a human papillomavirus (HPV) vaccine  if you are female and aged 23 to 32 or male 23 to 24 and never received it  This vaccine protects you from HPV infection  HPV is the most common infection spread by sexual contact  HPV may also cause vaginal, penile, and anal cancers  Get a pneumococcal vaccine  if you are aged 72 years or older  The pneumococcal vaccine is an injection given to protect you from pneumococcal disease  Pneumococcal disease is an infection caused by pneumococcal bacteria  The infection may cause pneumonia, meningitis, or an ear infection  Get a shingles vaccine  if you are 60 or older, even if you have had shingles before  The shingles vaccine is an injection to protect you from the varicella-zoster virus  This is the same virus that causes chickenpox   Shingles is a painful rash that develops in people who had chickenpox or have been exposed to the virus  How to eat healthy:  My Plate is a model for planning healthy meals  It shows the types and amounts of foods that should go on your plate  Fruits and vegetables make up about half of your plate, and grains and protein make up the other half  A serving of dairy is included on the side of your plate  The amount of calories and serving sizes you need depends on your age, gender, weight, and height  Examples of healthy foods are listed below:  Eat a variety of vegetables  such as dark green, red, and orange vegetables  You can also include canned vegetables low in sodium (salt) and frozen vegetables without added butter or sauces  Eat a variety of fresh fruits , canned fruit in 100% juice, frozen fruit, and dried fruit  Include whole grains  At least half of the grains you eat should be whole grains  Examples include whole-wheat bread, wheat pasta, brown rice, and whole-grain cereals such as oatmeal     Eat a variety of protein foods such as seafood (fish and shellfish), lean meat, and poultry without skin (turkey and chicken)  Examples of lean meats include pork leg, shoulder, or tenderloin, and beef round, sirloin, tenderloin, and extra lean ground beef  Other protein foods include eggs and egg substitutes, beans, peas, soy products, nuts, and seeds  Choose low-fat dairy products such as skim or 1% milk or low-fat yogurt, cheese, and cottage cheese  Limit unhealthy fats  such as butter, hard margarine, and shortening  Exercise:  Exercise at least 30 minutes per day on most days of the week  Some examples of exercise include walking, biking, dancing, and swimming  You can also fit in more physical activity by taking the stairs instead of the elevator or parking farther away from stores  Include muscle strengthening activities 2 days each week  Regular exercise provides many health benefits   It helps you manage your weight, and decreases your risk for type 2 diabetes, heart disease, stroke, and high blood pressure  Exercise can also help improve your mood  Ask your healthcare provider about the best exercise plan for you  General health and safety guidelines:   Do not smoke  Nicotine and other chemicals in cigarettes and cigars can cause lung damage  Ask your healthcare provider for information if you currently smoke and need help to quit  E-cigarettes or smokeless tobacco still contain nicotine  Talk to your healthcare provider before you use these products  Limit alcohol  A drink of alcohol is 12 ounces of beer, 5 ounces of wine, or 1½ ounces of liquor  Lose weight, if needed  Being overweight increases your risk of certain health conditions  These include heart disease, high blood pressure, type 2 diabetes, and certain types of cancer  Protect your skin  Do not sunbathe or use tanning beds  Use sunscreen with a SPF 15 or higher  Apply sunscreen at least 15 minutes before you go outside  Reapply sunscreen every 2 hours  Wear protective clothing, hats, and sunglasses when you are outside  Drive safely  Always wear your seatbelt  Make sure everyone in your car wears a seatbelt  A seatbelt can save your life if you are in an accident  Do not use your cell phone when you are driving  This could distract you and cause an accident  Pull over if you need to make a call or send a text message  Practice safe sex  Use latex condoms if are sexually active and have more than one partner  Your healthcare provider may recommend screening tests for sexually transmitted infections (STIs)  Wear helmets, lifejackets, and protective gear  Always wear a helmet when you ride a bike or motorcycle, go skiing, or play sports that could cause a head injury  Wear protective equipment when you play sports  Wear a lifejacket when you are on a boat or doing water sports      © Copyright [address] 2022 Information is for End User's use only and may not be sold, redistributed or otherwise used for commercial purposes  All illustrations and images included in CareNotes® are the copyrighted property of A D [employer] M [employer]  or [employer]  The above information is an  only  It is not intended as medical advice for individual conditions or treatments  Talk to your doctor, nurse or pharmacist before following any medical regimen to see if it is safe and effective for you

## 2022-08-18 ENCOUNTER — APPOINTMENT (OUTPATIENT)
Dept: LAB | Facility: CLINIC | Age: 37
End: 2022-08-18
Payer: COMMERCIAL

## 2022-08-18 ENCOUNTER — OFFICE VISIT (OUTPATIENT)
Dept: OBGYN CLINIC | Facility: CLINIC | Age: 37
End: 2022-08-18
Payer: COMMERCIAL

## 2022-08-18 VITALS
WEIGHT: 218 LBS | HEART RATE: 92 BPM | SYSTOLIC BLOOD PRESSURE: 126 MMHG | DIASTOLIC BLOOD PRESSURE: 82 MMHG | BODY MASS INDEX: 29.53 KG/M2 | HEIGHT: 72 IN

## 2022-08-18 DIAGNOSIS — Z31.83 ENCOUNTER FOR ASSISTED REPRODUCTIVE FERTILITY CYCLE: ICD-10-CM

## 2022-08-18 DIAGNOSIS — Z80.3 FAMILY HISTORY OF BREAST CANCER: ICD-10-CM

## 2022-08-18 DIAGNOSIS — Z31.69 ENCOUNTER FOR PRECONCEPTION CONSULTATION: ICD-10-CM

## 2022-08-18 DIAGNOSIS — Z12.4 CERVICAL CANCER SCREENING: ICD-10-CM

## 2022-08-18 DIAGNOSIS — Z01.419 ENCNTR FOR GYN EXAM (GENERAL) (ROUTINE) W/O ABN FINDINGS: Primary | ICD-10-CM

## 2022-08-18 DIAGNOSIS — Z86.711 HISTORY OF PULMONARY EMBOLISM: ICD-10-CM

## 2022-08-18 DIAGNOSIS — N63.24 MASS OF LOWER INNER QUADRANT OF LEFT BREAST: ICD-10-CM

## 2022-08-18 DIAGNOSIS — E28.2 PCOS (POLYCYSTIC OVARIAN SYNDROME): ICD-10-CM

## 2022-08-18 DIAGNOSIS — Z12.31 ENCOUNTER FOR SCREENING MAMMOGRAM FOR BREAST CANCER: ICD-10-CM

## 2022-08-18 LAB
B-HCG SERPL-ACNC: <2 MIU/ML
FSH SERPL-ACNC: 5.9 MIU/ML
PROLACTIN SERPL-MCNC: 13.6 NG/ML
TSH SERPL DL<=0.05 MIU/L-ACNC: 1.25 UIU/ML (ref 0.45–4.5)

## 2022-08-18 PROCEDURE — S0610 ANNUAL GYNECOLOGICAL EXAMINA: HCPCS | Performed by: OBSTETRICS & GYNECOLOGY

## 2022-08-18 PROCEDURE — 84403 ASSAY OF TOTAL TESTOSTERONE: CPT

## 2022-08-18 PROCEDURE — 85305 CLOT INHIBIT PROT S TOTAL: CPT

## 2022-08-18 PROCEDURE — 84402 ASSAY OF FREE TESTOSTERONE: CPT

## 2022-08-18 PROCEDURE — 85732 THROMBOPLASTIN TIME PARTIAL: CPT

## 2022-08-18 PROCEDURE — 83001 ASSAY OF GONADOTROPIN (FSH): CPT

## 2022-08-18 PROCEDURE — 85306 CLOT INHIBIT PROT S FREE: CPT

## 2022-08-18 PROCEDURE — G0145 SCR C/V CYTO,THINLAYER,RESCR: HCPCS | Performed by: OBSTETRICS & GYNECOLOGY

## 2022-08-18 PROCEDURE — 36415 COLL VENOUS BLD VENIPUNCTURE: CPT

## 2022-08-18 PROCEDURE — 85613 RUSSELL VIPER VENOM DILUTED: CPT

## 2022-08-18 PROCEDURE — 81241 F5 GENE: CPT

## 2022-08-18 PROCEDURE — 86147 CARDIOLIPIN ANTIBODY EA IG: CPT

## 2022-08-18 PROCEDURE — 85303 CLOT INHIBIT PROT C ACTIVITY: CPT

## 2022-08-18 PROCEDURE — 85705 THROMBOPLASTIN INHIBITION: CPT

## 2022-08-18 PROCEDURE — 81240 F2 GENE: CPT

## 2022-08-18 PROCEDURE — 84443 ASSAY THYROID STIM HORMONE: CPT

## 2022-08-18 PROCEDURE — 86146 BETA-2 GLYCOPROTEIN ANTIBODY: CPT

## 2022-08-18 PROCEDURE — 85670 THROMBIN TIME PLASMA: CPT

## 2022-08-18 PROCEDURE — 84146 ASSAY OF PROLACTIN: CPT

## 2022-08-18 PROCEDURE — 82627 DEHYDROEPIANDROSTERONE: CPT

## 2022-08-18 PROCEDURE — 83498 ASY HYDROXYPROGESTERONE 17-D: CPT

## 2022-08-18 PROCEDURE — 85300 ANTITHROMBIN III ACTIVITY: CPT

## 2022-08-18 PROCEDURE — G0476 HPV COMBO ASSAY CA SCREEN: HCPCS | Performed by: OBSTETRICS & GYNECOLOGY

## 2022-08-18 PROCEDURE — 84702 CHORIONIC GONADOTROPIN TEST: CPT

## 2022-08-18 RX ORDER — OMEPRAZOLE 40 MG/1
CAPSULE, DELAYED RELEASE ORAL
COMMUNITY
Start: 2021-03-01

## 2022-08-18 RX ORDER — LEVOCETIRIZINE DIHYDROCHLORIDE 5 MG/1
TABLET, FILM COATED ORAL DAILY
COMMUNITY

## 2022-08-18 RX ORDER — BUSPIRONE HYDROCHLORIDE 10 MG/1
TABLET ORAL
COMMUNITY
Start: 2020-06-01

## 2022-08-18 RX ORDER — SEMAGLUTIDE 2.4 MG/.75ML
2.4 INJECTION, SOLUTION SUBCUTANEOUS
COMMUNITY
Start: 2022-01-02

## 2022-08-18 RX ORDER — FENOFIBRATE 145 MG/1
1 TABLET, COATED ORAL DAILY
COMMUNITY
Start: 2020-09-01

## 2022-08-18 RX ORDER — MONTELUKAST SODIUM 10 MG/1
TABLET ORAL
COMMUNITY
Start: 2020-06-01

## 2022-08-18 RX ORDER — LORATADINE 10 MG/1
10 TABLET ORAL DAILY
COMMUNITY

## 2022-08-19 LAB
CARDIOLIPIN IGA SER IA-ACNC: 1.8
CARDIOLIPIN IGG SER IA-ACNC: 1.3
CARDIOLIPIN IGM SER IA-ACNC: 1.1
DEPRECATED AT III PPP: 87 % OF NORMAL (ref 92–136)
DHEA-S SERPL-MCNC: 148 UG/DL (ref 57.3–279.2)
HPV HR 12 DNA CVX QL NAA+PROBE: NEGATIVE
HPV16 DNA CVX QL NAA+PROBE: NEGATIVE
HPV18 DNA CVX QL NAA+PROBE: NEGATIVE
TESTOST FREE SERPL-MCNC: 2.6 PG/ML (ref 0–4.2)
TESTOST SERPL-MCNC: 18 NG/DL (ref 8–60)

## 2022-08-21 LAB
PROT S ACT/NOR PPP: 113 % (ref 61–136)
PROT S PPP-ACNC: 93 % (ref 60–150)

## 2022-08-22 LAB
PROT C AG ACT/NOR PPP IA: 138 % OF NORMAL (ref 60–150)
PROT S ACT/NOR PPP: 99 % (ref 68–108)

## 2022-08-23 ENCOUNTER — HOSPITAL ENCOUNTER (OUTPATIENT)
Dept: ULTRASOUND IMAGING | Facility: HOSPITAL | Age: 37
Discharge: HOME | End: 2022-08-23
Attending: OBSTETRICS & GYNECOLOGY
Payer: COMMERCIAL

## 2022-08-23 DIAGNOSIS — E28.2 PCOS (POLYCYSTIC OVARIAN SYNDROME): ICD-10-CM

## 2022-08-23 LAB
17OHP SERPL-MCNC: 31 NG/DL
APTT SCREEN TO CONFIRM RATIO: 1.16 RATIO (ref 0–1.34)
B2 GLYCOPROT1 IGA SERPL IA-ACNC: 1.5
B2 GLYCOPROT1 IGG SERPL IA-ACNC: 0.7
B2 GLYCOPROT1 IGM SERPL IA-ACNC: <2.9
CONFIRM APTT/NORMAL: 36.5 SEC (ref 0–47.6)
LA PPP-IMP: NORMAL
SCREEN APTT: 34.1 SEC (ref 0–51.9)
SCREEN DRVVT: 40.8 SEC (ref 0–47)
THROMBIN TIME: 18.9 SEC (ref 0–23)

## 2022-08-23 PROCEDURE — 76830 TRANSVAGINAL US NON-OB: CPT

## 2022-08-23 PROCEDURE — 76856 US EXAM PELVIC COMPLETE: CPT

## 2022-08-24 LAB
F5 GENE MUT ANL BLD/T: NORMAL
LAB AP GYN PRIMARY INTERPRETATION: NORMAL
Lab: NORMAL

## 2022-08-25 LAB — F2 GENE MUT ANL BLD/T: NORMAL

## 2022-08-31 NOTE — PROGRESS NOTES
Assessment/Plan:    Problem List Items Addressed This Visit        Endocrine    PCOS (polycystic ovarian syndrome)       Other    History of pulmonary embolism - Primary      Other Visit Diagnoses     Dysmenorrhea            Patient going to [address] [place] [address] at the end of the month  Follow-up with MFM as planned    Discussed no clear signs of thrombophilia    Pelvic US does not show endometrioma      She requests Mirena IUD while awaiting infer ility tx    Mirena Intrauterine device was discussed with patient  Discussed with patient failure rate of less than 1%  Discussed with patient increased risk of ectopic pregnancy should a pregnancy occur  Discussed duration of use up to 7 years  Discussed the Intrauterine device is highly effective, long-acting and rapidly reversible with few side effects  This should not interfere with sexual activity  Hormonal side effects can including hormonal changes, weight gain, bloating, AUB, amenorrhea, increased risk of vaginitis, ovarian cyst formation  Discuss other potential complications such as expulsion of about 3 to 10%  Discussed other potential complications such as mechanical breakdown, uterine perforation, malposition,  pelvic or genital infection, non-visualized strings, difficulty removal     After insertion, patient should return if she has any fever, worsening pelvic pain, unusually heavy bleeding, suspected expulsion, foul-smelling vaginal discharge  Routine self Intrauterine device string checks are safe but not required  Follow-up is required after several weeks, usually after the 1st  menstrual cycle evaluate her satisfaction with the IUD and address any problems concerns or side effects  Advised avoidance of intercourse up to 1 week after insertion  Subjective   Patient ID: Nikita Reis is a 44 y o  female  Patient is here for a follow-up      Chief Complaint   Patient presents with    Follow-up     H/o cramping  Dysmenorrhea  H/o endometriosis  Migraines, nausea,  H/o PE    H/o PCOS  H/o Mirena IUD 2x, last one was removed in  due to weanted to try to get pregant      Menstrual History:  OB History        0    Para   0    Term   0       0    AB   0    Living   0       SAB   0    IAB   0    Ectopic   0    Multiple   0    Live Births   0               No LMP recorded     LMP       Past Medical History:   Diagnosis Date    Asthma     GERD (gastroesophageal reflux disease)     Hypertension     Migraines     PCOS (polycystic ovarian syndrome)     Pulmonary embolism ([facility] [address] ) 2009    Venous insufficiency        Past Surgical History:   Procedure Laterality Date    ADENOIDECTOMY      CHOLECYSTECTOMY      LAPAROSCOPY      TONSILLECTOMY         Social History     Tobacco Use    Smoking status: Never Smoker    Smokeless tobacco: Never Used   Vaping Use    Vaping Use: Never used   Substance Use Topics    Alcohol use: Not Currently    Drug use: Never       Allergies   Allergen Reactions    Erythromycin Swelling    Ethylenediamine Dermatitis, Hives, Itching and Swelling    Propolis Dermatitis, Hives, Itching and Swelling    Triptans Palpitations     flushing         Current Outpatient Medications:     albuterol (Ventolin HFA) 90 mcg/act inhaler, Inhale 2 puffs every 4 (four) hours as needed for wheezing or shortness of breath, Disp: 1 Inhaler, Rfl: 1    busPIRone (BUSPAR) 10 mg tablet, , Disp: , Rfl:     fenofibrate (TRICOR) 145 mg tablet, Take 1 tablet by mouth daily, Disp: , Rfl:     FLUoxetine (PROzac) 20 MG tablet, 10 mg, Disp: , Rfl:     hydrOXYzine HCL (ATARAX) 25 mg tablet, Take by mouth, Disp: , Rfl:     lisinopril (ZESTRIL) 10 mg tablet, Take by mouth, Disp: , Rfl:     loratadine (CLARITIN) 10 mg tablet, Take 10 mg by mouth daily, Disp: , Rfl:     metFORMIN (GLUCOPHAGE) 1000 MG tablet, Take by mouth 2 (two) times a day with meals, Disp: , Rfl:     montelukast (SINGULAIR) 10 mg tablet, , Disp: , Rfl:     omeprazole (PriLOSEC) 40 MG capsule, , Disp: , Rfl:     Semaglutide-Weight Management (Wegovy) 2 4 MG/0 75ML, 2 4 mg, Disp: , Rfl:     spironolactone (ALDACTONE) 10 mg/ml SUSP oral suspension, 1 5 mg/kg, Disp: , Rfl:     Topiramate (TOPAMAX PO), 50 mg 2 (two) times a day, Disp: , Rfl:     levocetirizine (XYZAL) 5 MG tablet, Daily (Patient not taking: No sig reported), Disp: , Rfl:       Review of Systems   Constitutional: Negative  HENT: Negative  Eyes: Negative  Respiratory: Negative  Cardiovascular: Negative  Gastrointestinal: Negative  Endocrine: Negative  Genitourinary:        As noted in HPI   Musculoskeletal: Negative  Skin: Negative  Allergic/Immunologic: Negative  Neurological: Negative  Hematological: Negative  Psychiatric/Behavioral: Negative  /64 (BP Location: Right arm, Patient Position: Sitting, Cuff Size: Adult)   Pulse 86   Ht 5' 11 5" (1 816 m)   Wt 96 6 kg (213 lb)   BMI 29 29 kg/m²       Physical Exam  Constitutional:       General: She is not in acute distress  Appearance: She is well-developed  Abdominal:      Palpations: Abdomen is soft  Tenderness: There is no abdominal tenderness  There is no guarding  Neurological:      Mental Status: She is alert and oriented to person, place, and time  Skin:     General: Skin is warm and dry  Psychiatric:         Behavior: Behavior normal                The remainder of her physical examination was deferred as she was here today for consultation and discussion  I have spent 20 minutes on day of encounter, time spent involved pre-charting, review of previous records, face to face encounter, counseling and post visit documentation

## 2022-09-01 ENCOUNTER — OFFICE VISIT (OUTPATIENT)
Dept: OBGYN CLINIC | Facility: CLINIC | Age: 37
End: 2022-09-01
Payer: COMMERCIAL

## 2022-09-01 VITALS
SYSTOLIC BLOOD PRESSURE: 122 MMHG | HEIGHT: 72 IN | WEIGHT: 213 LBS | DIASTOLIC BLOOD PRESSURE: 64 MMHG | HEART RATE: 86 BPM | BODY MASS INDEX: 28.85 KG/M2

## 2022-09-01 DIAGNOSIS — Z86.711 HISTORY OF PULMONARY EMBOLISM: Primary | ICD-10-CM

## 2022-09-01 DIAGNOSIS — E28.2 PCOS (POLYCYSTIC OVARIAN SYNDROME): ICD-10-CM

## 2022-09-01 DIAGNOSIS — N94.6 DYSMENORRHEA: ICD-10-CM

## 2022-09-01 PROCEDURE — 99213 OFFICE O/P EST LOW 20 MIN: CPT | Performed by: OBSTETRICS & GYNECOLOGY

## 2022-09-08 ENCOUNTER — HOSPITAL ENCOUNTER (OUTPATIENT)
Dept: MAMMOGRAPHY | Facility: CLINIC | Age: 37
End: 2022-09-08
Payer: COMMERCIAL

## 2022-09-08 ENCOUNTER — HOSPITAL ENCOUNTER (OUTPATIENT)
Dept: ULTRASOUND IMAGING | Facility: CLINIC | Age: 37
End: 2022-09-08
Payer: COMMERCIAL

## 2022-09-08 VITALS — HEIGHT: 72 IN | WEIGHT: 213 LBS | BODY MASS INDEX: 28.85 KG/M2

## 2022-09-08 DIAGNOSIS — Z80.3 FAMILY HISTORY OF BREAST CANCER: ICD-10-CM

## 2022-09-08 DIAGNOSIS — N63.24 MASS OF LOWER INNER QUADRANT OF LEFT BREAST: ICD-10-CM

## 2022-09-08 PROCEDURE — 77066 DX MAMMO INCL CAD BI: CPT

## 2022-09-08 PROCEDURE — G0279 TOMOSYNTHESIS, MAMMO: HCPCS

## 2022-09-08 PROCEDURE — 76642 ULTRASOUND BREAST LIMITED: CPT

## 2022-09-13 ENCOUNTER — DOCUMENTATION (OUTPATIENT)
Dept: OBGYN CLINIC | Facility: CLINIC | Age: 37
End: 2022-09-13

## 2022-10-06 ENCOUNTER — APPOINTMENT (OUTPATIENT)
Dept: LAB | Facility: CLINIC | Age: 37
End: 2022-10-06
Payer: COMMERCIAL

## 2022-10-06 DIAGNOSIS — Z01.83 ENCOUNTER FOR BLOOD TYPING: ICD-10-CM

## 2022-10-06 DIAGNOSIS — Z13.0 SCREENING FOR IRON DEFICIENCY ANEMIA: ICD-10-CM

## 2022-10-06 DIAGNOSIS — Z11.3 SCREENING EXAMINATION FOR VENEREAL DISEASE: ICD-10-CM

## 2022-10-06 DIAGNOSIS — Z11.59 SPECIAL SCREENING EXAMINATION FOR VIRAL DISEASE: ICD-10-CM

## 2022-10-06 DIAGNOSIS — Z13.29 SCREENING FOR THYROID DISORDER: ICD-10-CM

## 2022-10-06 DIAGNOSIS — Z31.41 FERTILITY TESTING: ICD-10-CM

## 2022-10-06 DIAGNOSIS — Z11.4 SCREENING FOR HUMAN IMMUNODEFICIENCY VIRUS: ICD-10-CM

## 2022-10-06 LAB
ABO GROUP BLD: NORMAL
BASOPHILS # BLD AUTO: 0.03 THOUSANDS/ΜL (ref 0–0.1)
BASOPHILS NFR BLD AUTO: 1 % (ref 0–1)
BLD GP AB SCN SERPL QL: NEGATIVE
EOSINOPHIL # BLD AUTO: 0.08 THOUSAND/ΜL (ref 0–0.61)
EOSINOPHIL NFR BLD AUTO: 1 % (ref 0–6)
ERYTHROCYTE [DISTWIDTH] IN BLOOD BY AUTOMATED COUNT: 13 % (ref 11.6–15.1)
HBV SURFACE AG SER QL: NORMAL
HCT VFR BLD AUTO: 43.5 % (ref 34.8–46.1)
HGB BLD-MCNC: 14 G/DL (ref 11.5–15.4)
IMM GRANULOCYTES # BLD AUTO: 0.01 THOUSAND/UL (ref 0–0.2)
IMM GRANULOCYTES NFR BLD AUTO: 0 % (ref 0–2)
LYMPHOCYTES # BLD AUTO: 1.71 THOUSANDS/ΜL (ref 0.6–4.47)
LYMPHOCYTES NFR BLD AUTO: 26 % (ref 14–44)
MCH RBC QN AUTO: 27.1 PG (ref 26.8–34.3)
MCHC RBC AUTO-ENTMCNC: 32.2 G/DL (ref 31.4–37.4)
MCV RBC AUTO: 84 FL (ref 82–98)
MONOCYTES # BLD AUTO: 0.63 THOUSAND/ΜL (ref 0.17–1.22)
MONOCYTES NFR BLD AUTO: 10 % (ref 4–12)
NEUTROPHILS # BLD AUTO: 4.14 THOUSANDS/ΜL (ref 1.85–7.62)
NEUTS SEG NFR BLD AUTO: 62 % (ref 43–75)
NRBC BLD AUTO-RTO: 0 /100 WBCS
PLATELET # BLD AUTO: 380 THOUSANDS/UL (ref 149–390)
PMV BLD AUTO: 10.3 FL (ref 8.9–12.7)
RBC # BLD AUTO: 5.16 MILLION/UL (ref 3.81–5.12)
RH BLD: POSITIVE
RUBV IGG SERPL IA-ACNC: 65.6 IU/ML
TSH SERPL DL<=0.05 MIU/L-ACNC: 1.24 UIU/ML (ref 0.45–4.5)
WBC # BLD AUTO: 6.6 THOUSAND/UL (ref 4.31–10.16)

## 2022-10-06 PROCEDURE — 87491 CHLMYD TRACH DNA AMP PROBE: CPT

## 2022-10-06 PROCEDURE — 86803 HEPATITIS C AB TEST: CPT

## 2022-10-06 PROCEDURE — 87591 N.GONORRHOEAE DNA AMP PROB: CPT

## 2022-10-06 PROCEDURE — 36415 COLL VENOUS BLD VENIPUNCTURE: CPT

## 2022-10-06 PROCEDURE — 80081 OBSTETRIC PANEL INC HIV TSTG: CPT

## 2022-10-06 PROCEDURE — 82397 CHEMILUMINESCENT ASSAY: CPT

## 2022-10-06 PROCEDURE — 84443 ASSAY THYROID STIM HORMONE: CPT

## 2022-10-06 PROCEDURE — 86787 VARICELLA-ZOSTER ANTIBODY: CPT

## 2022-10-06 PROCEDURE — 86645 CMV ANTIBODY IGM: CPT

## 2022-10-06 PROCEDURE — 86644 CMV ANTIBODY: CPT

## 2022-10-07 LAB
C TRACH DNA SPEC QL NAA+PROBE: NEGATIVE
CMV IGG SERPL IA-ACNC: >10 U/ML (ref 0–0.59)
CMV IGM SERPL IA-ACNC: <30 AU/ML (ref 0–29.9)
HCV AB S/CO SERPL IA: <0.1 S/CO RATIO (ref 0–0.9)
HIV 1+2 AB+HIV1 P24 AG SERPL QL IA: NORMAL
N GONORRHOEA DNA SPEC QL NAA+PROBE: NEGATIVE
RPR SER QL: NORMAL
SL AMB INTERPRETATION: NORMAL
VZV IGG SER QL IA: NORMAL

## 2022-10-14 LAB — MIS SERPL-MCNC: 0.05 NG/ML

## 2023-02-09 ENCOUNTER — APPOINTMENT (OUTPATIENT)
Dept: LAB | Facility: CLINIC | Age: 38
End: 2023-02-09

## 2023-02-09 ENCOUNTER — TELEPHONE (OUTPATIENT)
Dept: OBGYN CLINIC | Facility: CLINIC | Age: 38
End: 2023-02-09

## 2023-02-09 DIAGNOSIS — Z32.01 POSITIVE PREGNANCY TEST: Primary | ICD-10-CM

## 2023-02-09 DIAGNOSIS — Z32.01 POSITIVE PREGNANCY TEST: ICD-10-CM

## 2023-02-09 LAB
B-HCG SERPL-ACNC: 8614 MIU/ML
PROGEST SERPL-MCNC: 19 NG/ML

## 2023-02-09 NOTE — TELEPHONE ENCOUNTER
The patient called and states she got a +UPT test today 2/9/23  And would like blood work put in to confirm as she was told by specialist she will not conceive natural  LMP- 1/6/23  Patient is taking meds that she is worried about taking while pregnant  Please advise

## 2023-02-21 ENCOUNTER — OFFICE VISIT (OUTPATIENT)
Dept: OBGYN CLINIC | Facility: CLINIC | Age: 38
End: 2023-02-21

## 2023-02-21 VITALS
SYSTOLIC BLOOD PRESSURE: 126 MMHG | DIASTOLIC BLOOD PRESSURE: 82 MMHG | HEIGHT: 72 IN | WEIGHT: 202.4 LBS | HEART RATE: 92 BPM | BODY MASS INDEX: 27.41 KG/M2

## 2023-02-21 DIAGNOSIS — Z3A.01 7 WEEKS GESTATION OF PREGNANCY: ICD-10-CM

## 2023-02-21 DIAGNOSIS — O09.91 SUPERVISION OF HIGH RISK PREGNANCY IN FIRST TRIMESTER: ICD-10-CM

## 2023-02-21 DIAGNOSIS — G43.809 OTHER MIGRAINE WITHOUT STATUS MIGRAINOSUS, NOT INTRACTABLE: Primary | ICD-10-CM

## 2023-02-21 DIAGNOSIS — Z86.711 HISTORY OF PULMONARY EMBOLISM: ICD-10-CM

## 2023-02-21 DIAGNOSIS — Z14.8 CARRIER OF GENETIC DISORDER: ICD-10-CM

## 2023-02-21 PROBLEM — O09.90 SUPERVISION OF HIGH-RISK PREGNANCY: Status: ACTIVE | Noted: 2023-02-21

## 2023-02-21 PROBLEM — O10.919 CHRONIC HYPERTENSION AFFECTING PREGNANCY: Status: ACTIVE | Noted: 2020-04-30

## 2023-02-21 PROBLEM — G43.909 MIGRAINE: Status: ACTIVE | Noted: 2023-02-21

## 2023-02-21 PROBLEM — E66.01 MORBID OBESITY (HCC): Status: RESOLVED | Noted: 2020-04-30 | Resolved: 2023-02-21

## 2023-02-21 RX ORDER — METOCLOPRAMIDE 10 MG/1
10 TABLET ORAL EVERY 6 HOURS PRN
Qty: 30 TABLET | Refills: 0 | Status: SHIPPED | OUTPATIENT
Start: 2023-02-21

## 2023-02-21 RX ORDER — ENOXAPARIN SODIUM 100 MG/ML
40 INJECTION SUBCUTANEOUS DAILY
Qty: 12 ML | Refills: 0 | Status: SHIPPED | OUTPATIENT
Start: 2023-02-21 | End: 2023-03-23

## 2023-02-21 NOTE — PATIENT INSTRUCTIONS
- Avoiding fasting for prolonged periods of time - a simple snack such as crackers upon waking may be helpful  - Meals and snacks should be eaten slowly and in small amounts every one to two hours to avoid an overly full stomach  - Don't lie down immediately after eating  - Avoid triggers foods: examples include excessively spicy, odorous, high-fat, acidic, very sweet foods, coffee, soda  - Hard peppermint candies, ginger chews may be helpful    - Vitamin B6 (pyridoxine) 25-50 mg every 6-8 hours  - Unisom (doxylamine) 25 mg 1-2 tabs at night     - Can try tylenol 1000 mg (two extra strength tabs) at once - do not take more than 3000 mg in a 24 hour period  - If tylenol is not effective can try prescription reglan 10 mg, can be taken up to every 6 hours as needed  - If caffeine helps with migraines then caffeine containing medications are OK to take, but should ideally not exceed more than 200 mg of caffeine a day (an 8 oz cup of coffee is ~100 mg of caffeine)  - You can take benadryl 25-50 mg with above medications as they can be helpful when used together, however benadryl can be sedating  - Avoid NSAIDs unless directed by your doctor    - Continue to avoid migraine triggers: maintain a regular meal and sleep pattern, hydrate frequently  - If your migraines are frequent you can try these supplements for prevention: magnesium 400 mg daily, riboflavin 400 mg daily

## 2023-02-21 NOTE — PROGRESS NOTES
Subjective   Patient ID: Juan Soria is a 44 y o  female  Patient is here for a problem visit  Chief Complaint   Patient presents with   • Amenorrhea     Confirmed pregnancy with blood test  LMP-23     Sure LMP 23 -  6+4 WGA, 10/13/23   Planned and desired pregnancy   No recent contraception  Was planning IUI with Donnia Poster  Irregular cycles, sometimes 45 days apart      Intermittent pelvic cramping  +nausea, fatigue  No vaginal bleeding/spotting     cHTN - previously lisinopril, no meds currently  Sees cardiology     h/o B/L PE while on OCP with negative thrombophilia workup  In   Coumadin for 18 months     Patient is a carrier for CF, alpha thalassemia, Magdaleno Backer - had carrier screening with Donnia Poster, no partner testing yet     Desires genetic counseling       Menstrual History:  OB History        1    Para   0    Term   0       0    AB   0    Living   0       SAB   0    IAB   0    Ectopic   0    Multiple   0    Live Births   0                Patient's last menstrual period was 2023           Past Medical History:   Diagnosis Date   • Asthma    • Depression    • GERD (gastroesophageal reflux disease)    • Hypertension    • Migraines    • PCOS (polycystic ovarian syndrome)    • Pulmonary embolism ([facility] [address] )    • Venous insufficiency        Past Surgical History:   Procedure Laterality Date   • ADENOIDECTOMY     • CHOLECYSTECTOMY     • LAPAROSCOPY     • TONSILLECTOMY         Social History     Tobacco Use   • Smoking status: Never   • Smokeless tobacco: Never   Vaping Use   • Vaping Use: Never used   Substance Use Topics   • Alcohol use: Not Currently   • Drug use: Never        Allergies   Allergen Reactions   • Erythromycin Swelling   • Ethylenediamine Dermatitis, Hives, Itching and Swelling   • Propolis Dermatitis, Hives, Itching and Swelling   • Triptans Palpitations     flushing         Current Outpatient Medications:   •  enoxaparin (LOVENOX) 40 mg/0 4 mL, Inject 0 4 mL (40 mg total) under the skin in the morning for 30 doses, Disp: 12 mL, Rfl: 0  •  metoclopramide (Reglan) 10 mg tablet, Take 1 tablet (10 mg total) by mouth every 6 (six) hours as needed (headache, nausea), Disp: 30 tablet, Rfl: 0  •  albuterol (Ventolin HFA) 90 mcg/act inhaler, Inhale 2 puffs every 4 (four) hours as needed for wheezing or shortness of breath (Patient not taking: Reported on 2/21/2023), Disp: 1 Inhaler, Rfl: 1  •  busPIRone (BUSPAR) 10 mg tablet, , Disp: , Rfl:   •  FLUoxetine (PROzac) 20 MG tablet, 10 mg, Disp: , Rfl:   •  hydrOXYzine HCL (ATARAX) 25 mg tablet, Take by mouth, Disp: , Rfl:   •  levocetirizine (XYZAL) 5 MG tablet, Daily (Patient not taking: No sig reported), Disp: , Rfl:   •  loratadine (CLARITIN) 10 mg tablet, Take 10 mg by mouth daily, Disp: , Rfl:   •  montelukast (SINGULAIR) 10 mg tablet, , Disp: , Rfl:   •  omeprazole (PriLOSEC) 40 MG capsule, , Disp: , Rfl:   •  Topiramate (TOPAMAX PO), 50 mg 2 (two) times a day, Disp: , Rfl:       Review of Systems   Constitutional: Negative for chills and fever  Eyes: Negative for visual disturbance  Respiratory: Negative for chest tightness and shortness of breath  Cardiovascular: Negative for chest pain  Gastrointestinal: Positive for nausea  Negative for abdominal pain, diarrhea and vomiting  Genitourinary: Negative for pelvic pain and vaginal bleeding  As noted in HPI   Skin: Negative for rash  Neurological: Positive for headaches  All other systems reviewed and are negative  /82   Pulse 92   Ht 5' 11 5" (1 816 m)   Wt 91 8 kg (202 lb 6 4 oz)   LMP 01/06/2023   BMI 27 84 kg/m²       Physical Exam  Constitutional:       General: She is not in acute distress  Appearance: Normal appearance  She is not ill-appearing  Genitourinary:      Urethral meatus normal       Right Labia: No rash, tenderness, lesions or skin changes       Left Labia: No tenderness, lesions, skin changes or rash  No labial fusion noted  Pelvic exam was performed with patient in the lithotomy position  HENT:      Head: Normocephalic  Cardiovascular:      Rate and Rhythm: Normal rate  Heart sounds: Normal heart sounds  Pulmonary:      Effort: Pulmonary effort is normal  No accessory muscle usage or respiratory distress  Abdominal:      General: There is no distension  Palpations: Abdomen is soft  There is no mass  Tenderness: There is no abdominal tenderness  There is no guarding or rebound  Musculoskeletal:         General: Normal range of motion  Cervical back: No rigidity  Neurological:      General: No focal deficit present  Mental Status: She is alert  Mental status is at baseline  Skin:     General: Skin is warm and dry  Psychiatric:         Mood and Affect: Mood normal          Behavior: Behavior normal    Vitals and nursing note reviewed  AMB US OB < 14 weeks single or first gestation level 1  Narrative: FIRST TRIMESTER OBSTETRIC ULTRASOUND  02/21/23    Smith Melendez MD       INDICATION: Amenorrhea, viability    COMPARISON: None  TECHNIQUE:   Transvaginal imaging was performed to assess the gestation,   myometrial/endometrial architecture and ovarian parenchymal detail  The study includes volumetric sweeps and traditional still imaging   technique  FINDINGS:     A single intrauterine gestation is identified  Cardiac activity is detected at 144 bpm       YOLK SAC:  Present and normal in size and appearance  MEAN CROWN RUMP LENGTH:  11 3 mm = 7 weeks 2 days   AMNIOTIC FLUID/SAC SHAPE:  Within expected normal range  UTERUS/ADNEXA:   No adnexal mass or pathologic cyst   No free fluid identified  Impression:    Single intrauterine pregnancy of 7 weeks 2 days gestational age  ALIZE by [address] 10/8/23  ALIZE by LMP 10/13/23  Fetal cardiac activity detected  No adnexal masses seen    Final ALIZE: 10/8/23 by ultrasound at this encounter  Ultrasound Probe Disinfection    A transvaginal ultrasound was performed  Prior to use, disinfection was performed with High Level Disinfection   Process (Trophon)  Probe serial number F: [id number] was used      JUNITO [address]  [address]  [address] [facility] [address]  [address] [place], [place] [id number]  Dept: [phone]  Dept Fax: [phone]  Loc Appt: [phone]  Loc: [phone]  Loc Fax: [phone]          Appropriate laboratory testing, imaging studies, and prior external records were reviewed:     Assessment/Plan:       Problem List Items Addressed This Visit        Cardiovascular and Mediastinum    Migraine - Primary     Previously on topamax, discussed reglan, benadryl, magnesium/riboflavin supplementation         Relevant Medications    metoclopramide (Reglan) 10 mg tablet       Other    History of pulmonary embolism     Given previous PE on OCP recommend prophylactic lovenox throughout the pregnancy and likely 6 weeks postpartum - rx sent         Relevant Medications    enoxaparin (LOVENOX) 40 mg/0 4 mL    Supervision of high-risk pregnancy     SLIUP seen, given 5 day discrepancy with irregular cycles ALIZE updated to reflect today's US  MFM and genetic counseling referrals placed  Briefly oriented to practice  Will return in 3 weeks for intake  Precautions reviewed         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   Other Visit Diagnoses     7 weeks gestation of pregnancy        Relevant Orders    Ambulatory Referral to Maternal Fetal Medicine    [facility] OB < 14 weeks single or first gestation level 1 (Completed)          Total time spent on counseling/coordination/documentation of patient care on date of service = 30 minutes  Face to face times: 25  Documentation times: 10

## 2023-02-21 NOTE — ASSESSMENT & PLAN NOTE
Given previous PE on OCP recommend prophylactic lovenox throughout the pregnancy and likely 6 weeks postpartum - rx sent

## 2023-02-21 NOTE — ASSESSMENT & PLAN NOTE
SLIUP seen, given 5 day discrepancy with irregular cycles ALIZE updated to reflect today's US  MFM and genetic counseling referrals placed  Briefly oriented to practice  Will return in 3 weeks for intake  Precautions reviewed

## 2023-03-06 ENCOUNTER — TELEMEDICINE (OUTPATIENT)
Dept: PERINATAL CARE | Facility: CLINIC | Age: 38
End: 2023-03-06

## 2023-03-06 DIAGNOSIS — Z31.5 ENCOUNTER FOR PROCREATIVE GENETIC COUNSELING: ICD-10-CM

## 2023-03-06 DIAGNOSIS — O09.519 ADVANCED MATERNAL AGE, PRIMIGRAVIDA, ANTEPARTUM: Primary | ICD-10-CM

## 2023-03-06 DIAGNOSIS — O35.2XX0 HEREDITARY DISEASE IN FAMILY POSSIBLY AFFECTING FETUS, AFFECTING MANAGEMENT OF MOTHER IN PREGNANCY, SINGLE OR UNSPECIFIED FETUS: ICD-10-CM

## 2023-03-06 PROCEDURE — NC001 PR NO CHARGE: Performed by: GENETIC COUNSELOR, MS

## 2023-03-09 NOTE — PROGRESS NOTES
Genetic Counseling   High-Risk Gestation Note    Appointment Date:  3/6/2023  Referred By: Jaymie Ramirez MD  YOB: 1985  Partner:  Federica Chamberstorri  Indication for Visit:  advanced maternal age and personal and/or family history of genetic disorder:  Patient reported carrier of cystic fibrosis, alpha thalassemia, and Krystina Tipton  Pregnancy History:   Estimated Date of Delivery: 10/8/23  Estimated Gestational Age: 6w4d      Virtual Regular Visit    Verification of patient location:    Patient is located in the following state in which I hold an active license [place]      Assessment/Plan:    Problem List Items Addressed This Visit    None  Visit Diagnoses     Advanced maternal age, primigravida, antepartum    -  Primary    Hereditary disease in family possibly affecting fetus, affecting management of mother in pregnancy, single or unspecified fetus        Encounter for procreative genetic counseling                   Reason for visit is   Chief Complaint   Patient presents with   • Virtual Regular Visit        Encounter provider [facility]    Provider located at [address] [address] [phone]      Recent Visits  No visits were found meeting these conditions. Showing recent visits within past 7 days and meeting all other requirements  Future Appointments  No visits were found meeting these conditions. Showing future appointments within next 150 days and meeting all other requirements       The patient was identified by name and date of birth. Noel Dawn was informed that this is a telemedicine visit and that the visit is being conducted through the [employer]. She agrees to proceed. .  My office door was closed. No one else was in the room. She acknowledged consent and understanding of privacy and security of the video platform.  The patient has agreed to participate and understands they can discontinue the visit at any time. Garrett Avina is a 44 y.o. female who presented for genetic counseling to discuss the above indication. The patient had expanded carrier screening with [facility] and was found to be a carrier of cystic fibrosis, alpha thalassemia, and Todd-Lemli Optiz. The results were not available in the patient's chart to review and the current pregnancy was an unexpected natural conception. We reviewed the morbidity, mortality, and potential treatments of the above mentioned conditions. The autosomal recessive inheritance pattern was also discussed. Should Levar be a carrier for either of those conditions there is a 25% chance for an affected pregnancy. Prenatal diagnosis is available via CVS or amniocentesis. We also discussed  screening for cystic fibrosis and some hemoglobinopathies including alpha thalassemia. We discussed carrier screening for Levar for just the conditions in questions or an entire expanded screening panel similar to what Chloe  had performed. She stated that they will likely just pursue screening for the diseases she is a carrier for but will discuss the options with him. We made a plan for me to send a [employer] message with the options and so she can forward her test results to be reviewed. She will reply to the message or have Levar call me directly with his information so an order can be placed. We discussed the patient's age related risk for chromosome abnormalities. The risk of Down syndrome at age 43 at delivery is 1/125, and the risk for any chromosomal abnormality at this age is 1/81. The differences between full chromosome aneuploidies and copy number variants (microdeletions and microduplications) was also discussed. We reviewed that copy number variants occur in about 0.4% of all pregnancies. The risks, benefits, and limitations of amniocentesis were discussed with the patient.   Amniocentesis is performed under direct real time ultrasound visualization to avoid both the fetus and the placenta. Once amniotic fluid is withdrawn, laboratory analysis is performed and amniotic fluid alpha-fetoprotein, as well as chromosome and/or microarray analysis is undertaken. The risk of genetic amniocentesis includes, but is not limited to less than 1 in 300 pregnancy loss rate or  delivery rate if 23 weeks or greater, infection, bleeding, rupture of membranes, failure of cells to grow, karyotype error, laboratory error, etc.  Occasionally a repeat amniocentesis is necessary due to cell culture failure. Chromosome/microarray analysis from amniocentesis is 99.9% accurate and alpha-fetoprotein analysis can detect approximately 95% of open neural tube defects. Chorionic villus sampling (CVS) is another diagnostic testing option that is available earlier than amniocentesis, between 10-14 weeks gestation. Like amniocentesis, CVS is 99% accurate for detecting chromosomal problems. Unlike amniocentesis, CVS cannot detect alpha-fetoprotein levels in order to determine the risk for open neural tube defects. MSAFP testing would need to be performed at 15-20 weeks gestation for this purpose. The risk of CVS includes, but is not limited to, less than a 1 in 300 risk for pregnancy loss. There is also a 1% risk for maternal cell contamination and cell culture failure, in which case the CVS would need to be followed-up with amniocentesis. We reviewed the testing option of cell free fetal DNA screening (also known as noninvasive prenatal testing or NIPT). We discussed that it is a serum test to identify fragments of placental DNA in maternal blood. We reviewed the benefits and limitations of cell free fetal DNA screening in detecting Down syndrome, Trisomy 13, Trisomy 25 and sex chromosome aneuploidies.   We also discussed that cell free fetal DNA screening does not detect additional chromosomal abnormalities and the possibility of a failed test result. As cell free fetal DNA screening does not detect open neural tube defects, MSAFP screening is available at 15-20 weeks gestation. We discussed the availability of an ultrasound between 11-14 weeks gestation to measure the nuchal translucency (NT), which can assess for chromosome abnormalities, cardiac defects, and other adverse pregnancy outcomes. We reviewed that level II anatomy ultrasound is typically performed at approximately 20 weeks gestation. Level II ultrasound evaluation is between 60-80% accurate in detecting major physical birth defects and variations in fetal development that may be associated with chromosome/genetic abnormalities. Level II ultrasound evaluation is not able to detect all birth defects or health problems. After discussing the available prenatal screening and testing options Basia Mcdaniels elected to pursue cell free fetal DNA screening. She was informed that the results will disclose the fetal sex and will be available in her MyChart to review. An [facility] blood draw was scheduled for 3/16/23 at our [place] location. Results take approximately 7-10 days. The patient declined CVS and amniocentesis secondary to procedural related complications. She may reconsider diagnostic testing should the cell free fetal DNA screening come back abnormal.  Basia Mcdaniels is also planning on pursuing NT ultrasound, MSAFP screening and Level II ultrasound at the appropriate times. Histories for the patient and her partner's family were taken during our session. Basia Mcdaniels states that her sister has Factor V and does have clotting issues. We discussed that the screening Basia Mcdaniels had performed may include mutations in the F5 gene, which can be confirmed after her results are reviewed. She does not report a personal history of coagulation concerns. Further review of family history for the patient and her partner was noncontributory.   The family history was not significant for other genetic diseases or disorders, intellectual disability, birth defects, fetal loss, or consanguinity. Lastly, we discussed the fact that everyone in the general population regardless of age, family history, or medical background has approximately a 3-5% risk of having a child with some type of congenital anomaly, genetic disease or intellectual disability. Currently there are no tests available to rule out all birth defects or health problems. Vanessa Ovalles was provided with our contact information. I encouraged her to call with any questions or concerns. Plan/Tests Ordered:  1) Patient declined CVS and amniocentesis. 2) Patient elected NIPS - [facility] blood draw scheduled for 3/16/23 at our [facility] location. 3) Patient elected carrier screening for FOB - will forward her results and have Levar call me directly to arrange testing through [facility]. 4) NT ultrasound scheduled for 4/4/23. 5) MSAFP screening at 15-20 weeks gestation - to be ordered by patient OB office. 6) Level II anatomy ultrasound at approximately 20 weeks gestation.       HPI     Past Medical History:   Diagnosis Date   • Asthma    • Depression    • GERD (gastroesophageal reflux disease)    • Hypertension    • Migraines    • PCOS (polycystic ovarian syndrome)    • Pulmonary embolism ([address]) 2009   • Venous insufficiency        Past Surgical History:   Procedure Laterality Date   • ADENOIDECTOMY     • CHOLECYSTECTOMY     • LAPAROSCOPY     • TONSILLECTOMY         Current Outpatient Medications   Medication Sig Dispense Refill   • albuterol (Ventolin HFA) 90 mcg/act inhaler Inhale 2 puffs every 4 (four) hours as needed for wheezing or shortness of breath (Patient not taking: Reported on 2/21/2023) 1 Inhaler 1   • busPIRone (BUSPAR) 10 mg tablet      • enoxaparin (LOVENOX) 40 mg/0.4 mL Inject 0.4 mL (40 mg total) under the skin in the morning for 30 doses 12 mL 0   • FLUoxetine (PROzac) 20 MG tablet 10 mg     • hydrOXYzine HCL (ATARAX) 25 mg tablet Take by mouth     • levocetirizine (XYZAL) 5 MG tablet Daily (Patient not taking: No sig reported)     • loratadine (CLARITIN) 10 mg tablet Take 10 mg by mouth daily     • metoclopramide (Reglan) 10 mg tablet Take 1 tablet (10 mg total) by mouth every 6 (six) hours as needed (headache, nausea) 30 tablet 0   • montelukast (SINGULAIR) 10 mg tablet      • omeprazole (PriLOSEC) 40 MG capsule      • Topiramate (TOPAMAX PO) 50 mg 2 (two) times a day       No current facility-administered medications for this visit. Allergies   Allergen Reactions   • Erythromycin Swelling   • Ethylenediamine Dermatitis, Hives, Itching and Swelling   • Propolis Dermatitis, Hives, Itching and Swelling   • Triptans Palpitations     flushing       Review of Systems    Video Exam    There were no vitals filed for this visit.     Physical Exam     I spent 60 minutes directly with the patient during this visit

## 2023-03-13 PROBLEM — Z3A.10 10 WEEKS GESTATION OF PREGNANCY: Status: ACTIVE | Noted: 2023-03-13

## 2023-03-13 NOTE — PATIENT INSTRUCTIONS
Again, congratulations on your pregnancy! NEXT STEPS  Get Prenatal bloodwork if not already done  Call [facility] to schedule next ultrasound (done 12-13 wks)   Contact information for [facility] (AKA Maternal Fetal Medicine)- Main Number [phone]   Return to our office in 4 weeks for routine prenatal visit (or sooner if any problems/concerns arise- see packet for things to report)  Check out [employer] website to read the "Pregnancy Essentials Guide"      It can be found by going to Lit Building Directory-->select services-->select women's health-->select Obstetrics  [email or web address]  ----------------------------------------------------  [facility] [address], [address]  1463 [facility] [place], [address]    Warning Signs During Pregnancy  The list below includes warning signs your providers would like you to be aware of  If you experience any of these at any time during your pregnancy, please call us as soon as possible  Vaginal bleeding   Sharp abdominal pain that does not go away   Fever (more than 100  4? F and is not relieved with Tylenol)   Persistent vomiting lasting greater than 24 hours   Chest pain/Shortness of breath   Pain or burning when you urinate    Call the OFFICE [phone] for any questions/emergencies  At night or on the weekend, calls go through a triage service, please indicate it is an emergency and the DOCTOR on call will be paged    ---------------------------------------------------------------    Discomforts of Early Pregnancy  Tips for coping with nausea and vomiting during pregnancy   Eat meals and snacks slowly   Eat every 1-2 hours to avoid a full stomach   Don’t skip meals, avoid empty stomach   Eat a snack prior to getting out of bed   Avoid food and beverages with a strong aroma   Avoid dehydration - drink enough fluid to keep the urine pale yellow   Drink fluids before a meal to minimize the effect of a full stomach   Limit the amount of coffee and beverages that contain caffeine   Eliminate spicy, odorous, high fat (fried foods), acidic (tomato products) and sweet foods   Fluids that contain lemon (lemonade), mint (tea) or orange can usually be well tolerated   Snacks and meals that contain low-fat protein (lean meats, fish, poultry and eggs) along with eating easily digestible carbs (fruit, rice, toast, crackers and dry cereal) may be tolerated better   Foods with ginger may be well tolerated  May use ginger root powder, capsules or extract (up to 1000 mg per day)   Drink liquids in small amounts    If symptoms persist, please contact your provider  Tips for coping with constipation during pregnancy   Increase fiber and fluids   - Drink 8-10 cups of liquid, like water or low-sugar juice daily  - Keep urine pale with fluids (water, milk), fruit and vegetables   Eat a well-balanced diet that contains high fiber food (fruits, vegetables, whole grain breads and cereals, bran and dried beans)   Take a 30-minute walk daily   You may take a mild stool softener such as Colace®    If symptoms persist, please contact your provider  For any emergencies, PLEASE CALL THE OFFICE at [phone]  If the office is closed, the doctor on call will be paged by the answering service  Medications and Pregnancy- see handout in packetExpected Weight Gain During Pregnancy  If you have a healthy BMI (18-25) prior to pregnancy:  The recommended weight gain is between 25-35 pounds  Approximate weight gain  in the first trimester is 1-4 5 pounds  An expected weight gain during the second and  third trimester is approximately one pound per week  If you have a BMI of less than 18 prior to pregnancy,  you are considered underweight:  The recommended weight gain is between 28-40 pounds  Approximate weight gain  in the first trimester is 1-4 5 pounds   An expected weight gain during the second and  third trimester is just over one pound per week  If your BMI is 25 to 29 9: you are considered overweight:  You should gain 1/2 to 2/3 pound during the second and third trimester, for a total  weight gain of 15 to 25 pounds  If you have a BMI of greater than 30 prior to pregnancy,  you are considered overweight:  The recommended weight gain is between 15-25 pounds  Approximate weight gain  in the first trimester is 1-4 5 pounds  An expected weight gain during the second  and third trimester is approximately 0 5 pound per week  Foods to avoid during pregnancy:   Unpasteurized milk, juice and cheese  - Soft cheeses like feta or brie (if made with UNPASTEURIZED milk)   Unheated deli meats like lunchmeat and hotdogs   Undercooked poultry, beef, pork, seafood including raw sushi    What fish is safe to eat during pregnancy? Eat 8 to 12 ounces of fish a week  Pick from this group frequently, especially if you follow  the American Heart Association’s recommendation to eat fish at least 2 times a week  AVOID HIGH MERCURY FISH  A single meal of the following fish can put  you over the Environmental Protection  Agency’s safe limit for the month  High mercury fish:  Shark  Swordfish  [employer]  Tile Fish    Caffeine and Pregnancy  The  and [facility] of Obstetrics and Gynecologists (ACOG) urge pregnant  women to limit their caffeine consumption to no more than 200 milligrams (mg) per day  This is  comparable to having one 12-ounce cup of coffee a day  This level has been shown not to increase risk  of miscarriage, growth or  labor complications  Effects of higher levels are not known  Exercise During Pregnancy  A daily exercise program that consists of 30 minutes a day is recommended     Low impact exercises like walking and swimming are great exercises throughout  all of pregnancy   If you’re an avid strength  avoid lifting very heavy weights - nothing more  than 30 pounds    Drink plenty of fluids while exercising to stay hydrated  Be careful to avoid overheating  ACTIVITIES TO AVOID   Exercises that can make you lose your balance  Activities that can put your baby at risk i e  horseback riding, scuba diving, skiing  or snowboarding  Any other sport that puts you at risk for getting hit in the  abdominal area  Do not use saunas, steam rooms or hot tubs (that have a higher temperature  than 100F)   After the first trimester, avoid exercises that require you to lay flat on your back  Avoid exceeding a heart rate greater than 140 beats per minute  As long as you are  able to hold a conversation while exercising your heart rate is likely acceptable    Vaccines and Pregnancy    Information for pregnant women  Vaccines help protect you and your baby against serious diseases  Whooping Cough Vaccine  Whooping cough (or pertussis) can be serious for anyone, but for your , it can be lifethreatening  Up to 20 babies die each year in the [facility] due to whooping cough  When you get the whooping cough vaccine during your pregnancy, your body will create protective  antibodies and pass some of them to your baby before birth  These antibodies will provide your  baby some short-term, early protection against whooping cough  Learn more at www cdc gov/pertussis/pregnant/     Flu Vaccine  Changes in your immune, heart, and lung functions during pregnancy make you more likely to get  seriously ill from the flu  Catching the flu also increases your chances for serious problems for your  developing baby, including premature labor and delivery  Get the flu shot if you are pregnant during  flu season--it’s the best way to protect yourself and your baby for several months after birth from flu-related  complications  Flu seasons vary in their timing from season to season, but CDC recommends getting vaccinated  by the end of October, if possible   This timing helps protect you before flu activity begins to increase  Find more on how to prevent the flu by visiting www cdc gov/flu/     Covid Vaccine  Similar to the flu, Changes in your immune, heart, and lung functions during pregnancy make you more likely to get  seriously ill from COVID  It  also increases your chances for serious problems for your  developing baby, including premature labor and delivery  Please consider getting your covid vaccine if you haven't already  This is endorsed by both Scarlett Hansen of Obstetrics and Gynecology and Alex Ernandez of Maternal Fetal Medicine    Fetal Activity  You will most likely start to feel your baby move (also known as quickening) between  25 and 20 weeks of pregnancy  First time moms might feel their baby’s movements  closer to 25 weeks while a second time mom might feel those first movements closer  to 16 weeks  [facility]  [place], [place] [address] [address] [employer] [address] [place] [address]  [id number]   [email or web address]  [email or web address]   [phone] (fax)     [place]  [address]  [address]  Www [facility] com/appointments       [facility]  [facility] [employer]  [address] [facility] 36  [address]  [phone]  Www ChaseFuture      [facility]  [address]  [facility] [phone]  Www  OpenSpan      Bessie Moorebottom  900 [facility], [address]Th [address] 4  [address] [facility] [address]  Www itBit    In patient Care:  Dasha  [phone]    [facility]  / [address] [place] [address])- [place] for [address]  1000 [facility], [facility] 60   [phone] option 2    [facility] [address]  [facility], [address]  323 [facility]  [facility], [address]8Th Floor 2  David[address] 912  176 [facility] 1700 [facility]  [place] [address], [address]  [phone]    [address], [address]  [address]  [address]  [facility] [address] , [address]  [phone]    351 [facility], [address]  [phone]    Progressions  [place], [address]  [phone] ([address])    [facility]  765 [facility], [address]  [phone]      [address]  403 [facility],[address], [address]  Or [address]  [place], [address]  [phone] (both locations)    [address] [facility] [address] (ACE)  [facility], [address]  [phone]      The [facility] and [facility] [address], [address]  [place], [address]  13 [facility] [address] 280 [facility], [address]  [phone]     [facility]  [address], [address]Th [address] 2  [place], [address]  [address], 235 West [facility]  [address] 1711 [facility], [address]  [address] Zhen Ramos, [address]  [address]  [place], [address]  [phone]     [facility]  [address] [facility] Sophia,2Nd  Floor, [address]  [address]  [address] [facility] [address], [address], [address]  [address]  [address] MAR[address]A, [address]  [phone]    [facility]  [address], [address]  615 [facility]  [address] [place] [address] [address]  [phone]  [address] [facility] 75  [facility] 1    [place], [address] [place] [address]  [phone]                                   [facility]  [address]  2500 [facility], [address] [facility] [address]   [phone]    [facility]  300 [facility], [address] [facility] [address]  1501 [facility] 114 [facility], 1901 [facility] [address]  [address]  [facility], 167 [facility]  [address], [address]  Noemy Wiseman;    [phone]    40 Ruby Issa [facility], [address] [phone] Outpatient Psychiatry  70 [facility], 1690 N [facility], [address]     Kirsten Morrow MD  [address] , [address] [place] [address]  [address], Viviana [address] 42   [phone]     [facility] Psychological  Address: [facility], [address]  Phone: [phone]     [facility]  [address] [place] [address], [address]  [phone]     Women/Maternal health Information:   [facility]  [address], [address]Rd [address]   [phone]  [email or web address]

## 2023-03-13 NOTE — PROGRESS NOTES
44 y o  y/o female here for OB intake  The patient was oriented to practice  Patient's medical, genetic, surgical and family history were reviewed in detail  Diagnostic, genetic & carrier testing discussed, and the patient is aware this testing may not be covered by her insurance  TV u/s done 2/21/23 confirms SLIUP not consistent with 1/6/23 LMP, final EDC 10/8/23 by [address]  Genetic screen:  Canavan disease- negative  Cerebral palsy- negative  Cleft lip/palate- negative  Congenital anomalies- negative  Congenital heart disease- negative  Consanguinity- negative  Cystic fibrosis- negative  Down's syndrome- negative  Hemophilia- negative  Carroll's chorea- negative  Mental retardation/ intellectual disability/ cognitive delays- negative  Muscular dystrophy- negative  Neural tube defect- negative  Sickle cell anemia- negative  Sergei-sachs disease- negative  Fragile X- negative  Thalassemia- negative  Autism- negative  Type 1 diabetes- FOB's aunt  PKU- negative  Premature ovarian failure- negative      Age of patient: 44  Age of father of the baby: 43, Levar Nolan    Exposure risk:  Occupation:  at [employer]  Exposure chemicals/radiation:no  Alcohol exposure: no  Medications taking since LMP:Lovenox, Reglan, tylenol,  Topomax, buspar, prozac  Drug exposure:no  Smoker: no  Cat litter exposure: no    Depression screen:  negative  Domestic violence screen: negative      ZIKA screening:  Travel/ or plans for travel outside / [place] area/ [place]:  no,  zika precautions given    TB screening:   ? HIV-no  ? Close contact with individuals with known or suspected TB-no  ? Medical conditions known to increase risk of disease if infected  ? Ie  Diabetes, lupus, cancer, alcoholism, drug addiction- no  ? Birth in or emigration from high prevalent countries- no  ? Medically underserved- no  ? Homeless- no  ?  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  no    Partner: Does your partner have a problem with alcohol or drug use? no    Past: In the past, have you had difficulties in your life because of alcohol or other drugs, including prescription medication? no    Present:  In the past month have you drunk any alcohol or used other drugs?  no    Thyroid disease risk:  BMI >30: yes  Type 1 diabetes: no  Fhx or personal hx of thyroid disease: yes    Diabetes risk screening:   BMI 30 or greater: yes  Hx of macrosomia ( infant weight 9 lb or greater): no  Previous GDM hx: no  Hx PCOS or insulin resistance: yes  Hx of elevated HgbA1c, glucose tolerance, fasting glucose: no    HTN: yes  Hx elevated HDL/ triglycerides:yes  1st degree relative with diabetes: no  Hx cardiovascular ds: no   [place], , native [place], [place], : no      Glucola ordered: yes    Other screening:  Ashkenazi Buddhism/ [place]ajun descent: no, [facility] screening offered: no    Hx of gastric bypass/ gastric sleeve/ gastric band: no    Hx of HSV for patient or partner: no    Hx of MRSA: no    Last pap: 8/18/22  Hx of abnormal pap snomear: no  Hx of procedures to the cervix: no    Hx of chlamydia/ gonorrhea/ PID: no    Hx recurrent pregnancy loss/ stillbirth: no    Was this pregnancy a result of infertility treatment: no    Vaccine Hx:  Varicella: no  Flu vaccine: not completed  Hep B vaccine: yes    COVID vaccine :  completed X 5    Hx of covid in last 3 months: no    ASA/ PEC screen:  Previous uncomplicated full-term delivery: no  Multifetal gestation- 2 points: 0  Chronic HTN- 2 points: 2  Type 1 or 2 pre-gestational diabetes- 2 points: 0  Renal disease- 2 points: 0  Autoimmune disease- 2 points: 0  History of preeclampsia- 2 points: 0  IVF pregnancy- 1 point: 0  >10 year pregnancy interval-1 point: 0  Previous pregnancy with IUGR/ SGA/ adverse pregnancy outcome-1 point: 0  Nulliparity- 1 point: 1  BMI >30- 1 point: 1  Family history of preeclampsia in mother or sister- 1 point: 1  Age >or = 28- 1 point: 1   Race- 1 point: 0  Low socioeconomic status-1 point: 0    TOTAL SCORE: 6    Contraindications to ASA therapy:  NSAID/ ASA allergy:  no  Nasal polyps: no  Asthma with history of ASA induced bronchospasm: no    Relative contraindications:   History of GI bleed: no  Active peptic ulcer disease: no  Severe hepatic dysfunction: no    Pt has been recommended to take ASA 162mg during this pregnancy to lower her risk of preeclampsia: yes    Other:  Pre pregnancy weight: 202lbs    Ok with blood transfusion if needed: yes    Plans for feeding baby: undecided    Blood type: A positive    Hemoglobin electrophoresis completed in past: no    [facility]erred lab: [facility]    ONAF form needed: no    Nausea: +  Vomiting: no  severe cramping/ pain: no  Bleeding since pregnant: no  Urinary complaints: + frequency  Fever or rash since pregnant: + rash at armpits with + pregnancy        Current Outpatient Medications on File Prior to Visit   Medication Sig Dispense Refill   • metoclopramide (Reglan) 10 mg tablet Take 1 tablet (10 mg total) by mouth every 6 (six) hours as needed (headache, nausea) 30 tablet 0   • [DISCONTINUED] enoxaparin (LOVENOX) 40 mg/0 4 mL Inject 0 4 mL (40 mg total) under the skin in the morning for 30 doses 12 mL 0   • albuterol (Ventolin HFA) 90 mcg/act inhaler Inhale 2 puffs every 4 (four) hours as needed for wheezing or shortness of breath (Patient not taking: Reported on 2/21/2023) 1 Inhaler 1   • loratadine (CLARITIN) 10 mg tablet Take 10 mg by mouth daily     • montelukast (SINGULAIR) 10 mg tablet      • [DISCONTINUED] busPIRone (BUSPAR) 10 mg tablet      • [DISCONTINUED] FLUoxetine (PROzac) 20 MG tablet 10 mg     • [DISCONTINUED] hydrOXYzine HCL (ATARAX) 25 mg tablet Take by mouth     • [DISCONTINUED] levocetirizine (XYZAL) 5 MG tablet Daily (Patient not taking: No sig reported)     • [DISCONTINUED] omeprazole (PriLOSEC) 40 MG capsule      • [DISCONTINUED] Topiramate (TOPAMAX PO) 50 mg 2 (two) times a day       No current facility-administered medications on file prior to visit  Past Surgical History:   Procedure Laterality Date   • ADENOIDECTOMY     • CHOLECYSTECTOMY     • LAPAROSCOPY     • TONSILLECTOMY         Past Medical History:   Diagnosis Date   • Asthma    • Depression    • GERD (gastroesophageal reflux disease)    • Hypertension    • Migraines    • PCOS (polycystic ovarian syndrome)    • Pulmonary embolism ([facility] [address] )    • Venous insufficiency        OB History        1    Para   0    Term   0       0    AB   0    Living   0       SAB   0    IAB   0    Ectopic   0    Multiple   0    Live Births   0               Vitals:    23 0900   BP: 124/76   Pulse: 100         1  Pregnancy: H&P completed today  Prenatal course discussed with patient, including appointment schedule  Warning signs discussed and reviewed  OB folder  given with warning signs of pregnancy, prenatal visit schedule, safe medications in pregnancy, childbirth classes, lab location info, MFM info  2  Pt [facility]    3  Carrier screening completed in past    4  OB exam: to be completed today, see other encounter    5  Prenatal labs ordered today:  prenatal panel, hepatitis C, hemoglobin electrophoresis, glucola, TSH  6  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:  Patient is a carrier for CF, alpha thalassemia, Arthor Pair  FOB testing thru [facility]  7  Hx of pulmonary embolism: pt taking lovenox 40 daily  8  Chronic HTN: no meds  CMP, urine TP/ creatinine ordered    9  Depression/ anxiety: stopped topomax, buspar, prozac with + pregnancy  Referral to behavioral health sent    10  Asthma: inhaler as needed    11  Migraine     12  AMA: level II US, NIPT pending  13   1st trimester topomax exposure    14    FOB and his mother with hip dysplasia    15  Interstitial cystitis    16   GERD

## 2023-03-13 NOTE — PROGRESS NOTES
44 y o  y/o female here for New OB exam   She is 10w1d pregnant  OB intake done prior, see other encounter  TVUS 2/21/23 confirms SLIUP 7w2d, final EDC 10/8/23 by [address]  ROS:  Nausea: +  Vomiting: + occasional  Cramping/ pain: no  Bleeding since pregnant: no  Urinary complaints: + frequency  Fever or rash since pregnant: had rash at axilla that has resolved    Exposure chemicals/radiation:no  Alcohol exposure: no  Medications taking since LMP:topomax, buspar, prozac  Drug exposure:no  Smoker: no     ASA/ PEC screen: 6,  162 ASA recommended  yes      Current Outpatient Medications on File Prior to Visit   Medication Sig Dispense Refill   • albuterol (Ventolin HFA) 90 mcg/act inhaler Inhale 2 puffs every 4 (four) hours as needed for wheezing or shortness of breath (Patient not taking: Reported on 2/21/2023) 1 Inhaler 1   • aspirin (ECOTRIN LOW STRENGTH) 81 mg EC tablet Take 2 tablets (162 mg total) by mouth daily 180 tablet 1   • enoxaparin (LOVENOX) 40 mg/0 4 mL Inject 0 4 mL (40 mg total) under the skin in the morning for 30 doses 12 mL 0   • loratadine (CLARITIN) 10 mg tablet Take 10 mg by mouth daily     • metoclopramide (Reglan) 10 mg tablet Take 1 tablet (10 mg total) by mouth every 6 (six) hours as needed (headache, nausea) 30 tablet 0   • montelukast (SINGULAIR) 10 mg tablet      • [DISCONTINUED] busPIRone (BUSPAR) 10 mg tablet      • [DISCONTINUED] FLUoxetine (PROzac) 20 MG tablet 10 mg     • [DISCONTINUED] hydrOXYzine HCL (ATARAX) 25 mg tablet Take by mouth     • [DISCONTINUED] levocetirizine (XYZAL) 5 MG tablet Daily (Patient not taking: No sig reported)     • [DISCONTINUED] omeprazole (PriLOSEC) 40 MG capsule      • [DISCONTINUED] Topiramate (TOPAMAX PO) 50 mg 2 (two) times a day       No current facility-administered medications on file prior to visit         Past Surgical History:   Procedure Laterality Date   • ADENOIDECTOMY     • CHOLECYSTECTOMY     • LAPAROSCOPY     • TONSILLECTOMY Past Medical History:   Diagnosis Date   • Asthma    • Depression    • GERD (gastroesophageal reflux disease)    • Hypertension    • Migraines    • PCOS (polycystic ovarian syndrome)    • Pulmonary embolism ([facility] [address] ) 2009   • Venous insufficiency      Vitals:     03/16/23 0900   BP: 124/76   Pulse: 100         Neck: supple without nodes or thyromegaly  Heart: regular rate and rhythm  Lungs: clear to auscultation without rales, rhonci  Breasts: nontender, no masses, no discoloration, no discharge  Abdomen: soft, nontender, no masses  Ext genitalia: no lesions, no discoloration  Urethra: no discharge or erythema  Bladder: nontender, good support  Vagina: no discharge, no lesions  Cervix: no lesions, no cervical motion tenderness, posterior, long, closed  Adnexa: nontender, no masses  Uterus: nontender, 10 wk size      1  Pregnancy: H&P completed today  PN Labs reviewed today  Prenatal course discussed with patient, including appointment schedule  Warning signs discussed and reviewed  OB folder given with warning signs of pregnancy, prenatal visit schedule, safe medications in pregnancy, childbirth classes, lab location info, MFM info  2  Screening:  GC/CT collected  3  Pt had NIPT drawn today with MFM    4  Urinary frequency:  Urine UA and C&S sent    5  Follow up: Return in 4 weeks  Precautions regarding labor, leakage,   bleeding, and fetal movement reviewed  6  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:  Patient is a carrier for CF, alpha thalassemia, Liliam Gelineau  FOB testing thru MFM  7  Hx of pulmonary embolism: pt taking lovenox 40 daily  8  Chronic HTN: no meds  CMP, urine TP/ creatinine ordered    9  Depression/ anxiety: stopped topomax, buspar, prozac with + pregnancy  Referral to behavioral health sent    10  Asthma: inhaler as needed    11  Migraine     12  AMA: level II US, NIPT pending  13   1st trimester topomax exposure    14  FOB and his mother with hip dysplasia    15  Interstitial cystitis    16   GERD

## 2023-03-16 ENCOUNTER — INITIAL PRENATAL (OUTPATIENT)
Dept: OBGYN CLINIC | Facility: CLINIC | Age: 38
End: 2023-03-16

## 2023-03-16 ENCOUNTER — OFFICE VISIT (OUTPATIENT)
Dept: PERINATAL CARE | Facility: OTHER | Age: 38
End: 2023-03-16

## 2023-03-16 VITALS
SYSTOLIC BLOOD PRESSURE: 124 MMHG | HEART RATE: 100 BPM | DIASTOLIC BLOOD PRESSURE: 76 MMHG | WEIGHT: 221.6 LBS | BODY MASS INDEX: 30.02 KG/M2 | HEIGHT: 72 IN

## 2023-03-16 VITALS — BODY MASS INDEX: 29.85 KG/M2 | WEIGHT: 220.4 LBS | HEIGHT: 72 IN

## 2023-03-16 DIAGNOSIS — Z86.711 HISTORY OF PULMONARY EMBOLISM: ICD-10-CM

## 2023-03-16 DIAGNOSIS — O09.511 SUPERVISION OF ELDERLY PRIMIGRAVIDA IN FIRST TRIMESTER: ICD-10-CM

## 2023-03-16 DIAGNOSIS — Z3A.10 10 WEEKS GESTATION OF PREGNANCY: ICD-10-CM

## 2023-03-16 DIAGNOSIS — Z14.8 CARRIER OF GENETIC DISORDER: ICD-10-CM

## 2023-03-16 DIAGNOSIS — O09.91 SUPERVISION OF HIGH RISK PREGNANCY, ANTEPARTUM, FIRST TRIMESTER: Primary | ICD-10-CM

## 2023-03-16 DIAGNOSIS — O99.340 DEPRESSION AFFECTING PREGNANCY: ICD-10-CM

## 2023-03-16 DIAGNOSIS — R35.0 URINARY FREQUENCY: ICD-10-CM

## 2023-03-16 DIAGNOSIS — F32.A DEPRESSION AFFECTING PREGNANCY: ICD-10-CM

## 2023-03-16 DIAGNOSIS — O09.91 SUPERVISION OF HIGH RISK PREGNANCY IN FIRST TRIMESTER: Primary | ICD-10-CM

## 2023-03-16 DIAGNOSIS — Z83.49 FAMILY HISTORY OF THYROID DISEASE: ICD-10-CM

## 2023-03-16 DIAGNOSIS — Z11.3 SCREENING FOR STD (SEXUALLY TRANSMITTED DISEASE): ICD-10-CM

## 2023-03-16 DIAGNOSIS — O10.919 CHRONIC HYPERTENSION AFFECTING PREGNANCY: ICD-10-CM

## 2023-03-16 PROBLEM — U07.1 PNEUMONIA DUE TO COVID-19 VIRUS: Status: RESOLVED | Noted: 2020-04-30 | Resolved: 2023-03-16

## 2023-03-16 PROBLEM — J12.82 PNEUMONIA DUE TO COVID-19 VIRUS: Status: RESOLVED | Noted: 2020-04-30 | Resolved: 2023-03-16

## 2023-03-16 PROBLEM — J45.909 ASTHMA: Status: ACTIVE | Noted: 2023-03-16

## 2023-03-16 PROBLEM — K21.9 GERD (GASTROESOPHAGEAL REFLUX DISEASE): Status: ACTIVE | Noted: 2023-03-16

## 2023-03-16 PROBLEM — Z31.83 ENCOUNTER FOR ASSISTED REPRODUCTIVE FERTILITY CYCLE: Status: RESOLVED | Noted: 2022-08-18 | Resolved: 2023-03-16

## 2023-03-16 PROBLEM — N30.10 INTERSTITIAL CYSTITIS: Status: ACTIVE | Noted: 2023-03-16

## 2023-03-16 PROBLEM — O09.891 MEDICATION EXPOSURE DURING FIRST TRIMESTER OF PREGNANCY: Status: ACTIVE | Noted: 2023-03-16

## 2023-03-16 LAB
BACTERIA UR QL AUTO: ABNORMAL /HPF
BILIRUB UR QL STRIP: NEGATIVE
C TRACH DNA SPEC QL NAA+PROBE: NEGATIVE
CLARITY UR: CLEAR
COLOR UR: YELLOW
GLUCOSE UR STRIP-MCNC: NEGATIVE MG/DL
HGB UR QL STRIP.AUTO: NEGATIVE
KETONES UR STRIP-MCNC: NEGATIVE MG/DL
LEUKOCYTE ESTERASE UR QL STRIP: ABNORMAL
MUCOUS THREADS UR QL AUTO: ABNORMAL
N GONORRHOEA DNA SPEC QL NAA+PROBE: NEGATIVE
NITRITE UR QL STRIP: NEGATIVE
NON-SQ EPI CELLS URNS QL MICRO: ABNORMAL /HPF
PH UR STRIP.AUTO: 6.5 [PH]
PROT UR STRIP-MCNC: NEGATIVE MG/DL
RBC #/AREA URNS AUTO: ABNORMAL /HPF
SP GR UR STRIP.AUTO: 1.01 (ref 1–1.03)
UROBILINOGEN UR STRIP-ACNC: <2 MG/DL
WBC #/AREA URNS AUTO: ABNORMAL /HPF

## 2023-03-16 RX ORDER — ASPIRIN 81 MG/1
162 TABLET ORAL DAILY
Qty: 180 TABLET | Refills: 1 | Status: SHIPPED | OUTPATIENT
Start: 2023-03-16

## 2023-03-16 RX ORDER — ENOXAPARIN SODIUM 100 MG/ML
40 INJECTION SUBCUTANEOUS DAILY
Qty: 12 ML | Refills: 3 | Status: SHIPPED | OUTPATIENT
Start: 2023-03-16 | End: 2023-04-15

## 2023-03-16 NOTE — PROGRESS NOTES
Patient chose to have [facility] Non-invasive Prenatal Screen with fetal sex  Patient given brochure and is aware [facility] will contact their insurance and coordinate coverage  Patient made aware she will need to respond to text message or e-mail from [facility] within 2 business days or testing will be run through insurance  Patient informed text message will come from area code  "415"  Provided [employer] # [phone] and web site : [email or web address]    "[place] your test online" card with barcode and test tube ID provided to patient  Reviewed [facility]'s web site states 5-7 business days for results via their portal    [facility] message will be sent to patient when MFM receives results /provider reviews  2 vials of blood drawn from RIGHT arm by Katherin Rockwell  Patient tolerated blood draw without difficulty  Specimens labeled with patient identifiers (name, date of birth, specimen collection date), order and specimen were verified with patient, packed and sent via [employer]  Copy of lab order scanned to Epic media  [facility] will have results in approximately 7-10 business days and will call patient or notify via [address]  Patient aware viewing lab result online will reveal fetal sex if ordered  Patient verbalized understanding of all instructions and no questions at this time

## 2023-03-18 LAB
BACTERIA UR CULT: ABNORMAL
BACTERIA UR CULT: ABNORMAL

## 2023-03-20 ENCOUNTER — TELEPHONE (OUTPATIENT)
Age: 38
End: 2023-03-20

## 2023-03-20 DIAGNOSIS — R35.0 URINARY FREQUENCY: Primary | ICD-10-CM

## 2023-03-20 NOTE — TELEPHONE ENCOUNTER
Spoke with pt to review urine results  Discussed that lactobacillus is typically vaginal contaminant and the staph is a low number that I would not treat  Pt says she still has urinary frequency, no other burning or urgency  She is planning to go for prenatal labs on Thursday  Will plan to repeat urine then  She will call sooner if any worsening symptoms        Urine Culture 20,000-29,000 cfu/ml Lactobacillus species Abnormal        <10,000 cfu/ml Staphylococcus coagulase negative

## 2023-03-21 ENCOUNTER — APPOINTMENT (OUTPATIENT)
Dept: LAB | Facility: CLINIC | Age: 38
End: 2023-03-21

## 2023-03-21 DIAGNOSIS — O10.919 CHRONIC HYPERTENSION AFFECTING PREGNANCY: ICD-10-CM

## 2023-03-21 DIAGNOSIS — Z83.49 FAMILY HISTORY OF THYROID DISEASE: ICD-10-CM

## 2023-03-21 DIAGNOSIS — O09.91 SUPERVISION OF HIGH RISK PREGNANCY, ANTEPARTUM, FIRST TRIMESTER: ICD-10-CM

## 2023-03-21 DIAGNOSIS — R35.0 URINARY FREQUENCY: ICD-10-CM

## 2023-03-21 LAB
ABO GROUP BLD: NORMAL
ALBUMIN SERPL BCP-MCNC: 3.4 G/DL (ref 3.5–5)
ALP SERPL-CCNC: 33 U/L (ref 46–116)
ALT SERPL W P-5'-P-CCNC: 23 U/L (ref 12–78)
ANION GAP SERPL CALCULATED.3IONS-SCNC: 2 MMOL/L (ref 4–13)
AST SERPL W P-5'-P-CCNC: 13 U/L (ref 5–45)
BACTERIA UR QL AUTO: NORMAL /HPF
BASOPHILS # BLD AUTO: 0.04 THOUSANDS/ÂΜL (ref 0–0.1)
BASOPHILS NFR BLD AUTO: 1 % (ref 0–1)
BILIRUB SERPL-MCNC: 0.23 MG/DL (ref 0.2–1)
BILIRUB UR QL STRIP: NEGATIVE
BLD GP AB SCN SERPL QL: NEGATIVE
BUN SERPL-MCNC: 8 MG/DL (ref 5–25)
CALCIUM ALBUM COR SERPL-MCNC: 9.3 MG/DL (ref 8.3–10.1)
CALCIUM SERPL-MCNC: 8.8 MG/DL (ref 8.3–10.1)
CHLORIDE SERPL-SCNC: 107 MMOL/L (ref 96–108)
CLARITY UR: CLEAR
CO2 SERPL-SCNC: 26 MMOL/L (ref 21–32)
COLOR UR: COLORLESS
CREAT SERPL-MCNC: 0.54 MG/DL (ref 0.6–1.3)
CREAT UR-MCNC: 24.4 MG/DL
EOSINOPHIL # BLD AUTO: 0.11 THOUSAND/ÂΜL (ref 0–0.61)
EOSINOPHIL NFR BLD AUTO: 1 % (ref 0–6)
ERYTHROCYTE [DISTWIDTH] IN BLOOD BY AUTOMATED COUNT: 13.7 % (ref 11.6–15.1)
GFR SERPL CREATININE-BSD FRML MDRD: 120 ML/MIN/1.73SQ M
GLUCOSE 1H P 50 G GLC PO SERPL-MCNC: 117 MG/DL (ref 40–134)
GLUCOSE P FAST SERPL-MCNC: 129 MG/DL (ref 65–99)
GLUCOSE UR STRIP-MCNC: NEGATIVE MG/DL
HBV SURFACE AG SER QL: NORMAL
HCT VFR BLD AUTO: 42.6 % (ref 34.8–46.1)
HCV AB SER QL: NORMAL
HGB BLD-MCNC: 13.3 G/DL (ref 11.5–15.4)
HGB UR QL STRIP.AUTO: NEGATIVE
HIV 1+2 AB+HIV1 P24 AG SERPL QL IA: NORMAL
HIV 2 AB SERPL QL IA: NORMAL
HIV1 AB SERPL QL IA: NORMAL
HIV1 P24 AG SERPL QL IA: NORMAL
IMM GRANULOCYTES # BLD AUTO: 0.04 THOUSAND/UL (ref 0–0.2)
IMM GRANULOCYTES NFR BLD AUTO: 1 % (ref 0–2)
KETONES UR STRIP-MCNC: NEGATIVE MG/DL
LEUKOCYTE ESTERASE UR QL STRIP: NEGATIVE
LYMPHOCYTES # BLD AUTO: 2.33 THOUSANDS/ÂΜL (ref 0.6–4.47)
LYMPHOCYTES NFR BLD AUTO: 27 % (ref 14–44)
MCH RBC QN AUTO: 27.7 PG (ref 26.8–34.3)
MCHC RBC AUTO-ENTMCNC: 31.2 G/DL (ref 31.4–37.4)
MCV RBC AUTO: 89 FL (ref 82–98)
MONOCYTES # BLD AUTO: 0.74 THOUSAND/ÂΜL (ref 0.17–1.22)
MONOCYTES NFR BLD AUTO: 9 % (ref 4–12)
NEUTROPHILS # BLD AUTO: 5.36 THOUSANDS/ÂΜL (ref 1.85–7.62)
NEUTS SEG NFR BLD AUTO: 61 % (ref 43–75)
NITRITE UR QL STRIP: NEGATIVE
NON-SQ EPI CELLS URNS QL MICRO: NORMAL /HPF
NRBC BLD AUTO-RTO: 0 /100 WBCS
PH UR STRIP.AUTO: 7 [PH]
PLATELET # BLD AUTO: 262 THOUSANDS/UL (ref 149–390)
PMV BLD AUTO: 10.6 FL (ref 8.9–12.7)
POTASSIUM SERPL-SCNC: 3.8 MMOL/L (ref 3.5–5.3)
PROT SERPL-MCNC: 7 G/DL (ref 6.4–8.4)
PROT UR STRIP-MCNC: NEGATIVE MG/DL
PROT UR-MCNC: <6 MG/DL
RBC # BLD AUTO: 4.81 MILLION/UL (ref 3.81–5.12)
RBC #/AREA URNS AUTO: NORMAL /HPF
RH BLD: POSITIVE
RUBV IGG SERPL IA-ACNC: 65.9 IU/ML
SODIUM SERPL-SCNC: 135 MMOL/L (ref 135–147)
SP GR UR STRIP.AUTO: 1.01 (ref 1–1.03)
TREPONEMA PALLIDUM IGG+IGM AB [PRESENCE] IN SERUM OR PLASMA BY IMMUNOASSAY: NORMAL
TSH SERPL DL<=0.05 MIU/L-ACNC: 1.22 UIU/ML (ref 0.45–4.5)
UROBILINOGEN UR STRIP-ACNC: <2 MG/DL
VZV IGG SER QL IA: NORMAL
WBC # BLD AUTO: 8.62 THOUSAND/UL (ref 4.31–10.16)
WBC #/AREA URNS AUTO: NORMAL /HPF

## 2023-03-22 LAB
GAMMA INTERFERON BACKGROUND BLD IA-ACNC: 0.05 IU/ML
M TB IFN-G BLD-IMP: NEGATIVE
M TB IFN-G CD4+ BCKGRND COR BLD-ACNC: 0 IU/ML
M TB IFN-G CD4+ BCKGRND COR BLD-ACNC: 0.01 IU/ML
MITOGEN IGNF BCKGRD COR BLD-ACNC: >10 IU/ML

## 2023-03-23 PROBLEM — O99.210 OBESITY AFFECTING PREGNANCY: Status: ACTIVE | Noted: 2023-03-23

## 2023-03-23 LAB
BACTERIA UR CULT: ABNORMAL
BACTERIA UR CULT: ABNORMAL

## 2023-03-26 ENCOUNTER — NURSE TRIAGE (OUTPATIENT)
Dept: OTHER | Facility: OTHER | Age: 38
End: 2023-03-26

## 2023-03-26 NOTE — TELEPHONE ENCOUNTER
"  Reason for Disposition  • [1] Constant abdominal pain AND [2] present > 2 hours    Answer Assessment - Initial Assessment Questions  1  ONSET: \"When did this bleeding start? \"        Around 1330    2  DESCRIPTION: \"Describe the bleeding that you are having  \" \"How much bleeding is there? \"     - SPOTTING: spotting, or pinkish / brownish mucous discharge; does not fill panti-liner or pad     - MILD:  less than 1 pad / hour; less than patient's usual menstrual bleeding    - MODERATE: 1-2 pads / hour; 1 menstrual cup every 6 hours; small-medium blood clots (e g , pea, grape, small coin)    - SEVERE: soaking 2 or more pads/hour for 2 or more hours; 1 menstrual cup every 2 hours; bleeding not contained by pads or continuous red blood from vagina; large blood clots (e g , golf ball, large coin)      Pink spotting and brown discharge-when wiping, and on the pad as well     3  ABDOMINAL PAIN SEVERITY: If present, ask: \"How bad is it? \"  (e g , Scale 1-10; mild, moderate, or severe)    - MILD (1-3): doesn't interfere with normal activities, abdomen soft and not tender to touch     - MODERATE (4-7): interferes with normal activities or awakens from sleep, tender to touch     - SEVERE (8-10): excruciating pain, doubled over, unable to do any normal activities      Abdominal cramping 7/10    4  PREGNANCY: \"Do you know how many weeks or months pregnant you are?\" \"When was the first day of your last normal menstrual period? \"      12 weeks pregnant     5  HEMODYNAMIC STATUS: \"Are you weak or feeling lightheaded? \" If Yes, ask: \"Can you stand and walk normally? \"       Denies    6  OTHER SYMPTOMS: \"What other symptoms are you having with the bleeding? \" (e g , passed tissue, vaginal discharge, fever, menstrual-type cramps)      Headache    Tylenol 1000 mg PO before calling    Protocols used: PREGNANCY - VAGINAL BLEEDING LESS THAN 20 WEEKS [id number]    "

## 2023-03-26 NOTE — TELEPHONE ENCOUNTER
"Regardin weeks pregnant/pink spotting/brown discharge/cramping  ----- Message from Geo Arreguin sent at 3/26/2023  4:48 PM EDT -----  Pt called \"I am 12 weeks having some pink spotting that started today as well as brown discharge and cramping  \"    "

## 2023-03-27 ENCOUNTER — ROUTINE PRENATAL (OUTPATIENT)
Dept: OBGYN CLINIC | Facility: CLINIC | Age: 38
End: 2023-03-27

## 2023-03-27 VITALS
BODY MASS INDEX: 30.96 KG/M2 | TEMPERATURE: 98.1 F | HEIGHT: 72 IN | DIASTOLIC BLOOD PRESSURE: 72 MMHG | HEART RATE: 94 BPM | OXYGEN SATURATION: 99 % | SYSTOLIC BLOOD PRESSURE: 126 MMHG | WEIGHT: 228.6 LBS

## 2023-03-27 DIAGNOSIS — Z3A.12 12 WEEKS GESTATION OF PREGNANCY: Primary | ICD-10-CM

## 2023-03-27 DIAGNOSIS — O09.891 MEDICATION EXPOSURE DURING FIRST TRIMESTER OF PREGNANCY: ICD-10-CM

## 2023-03-27 DIAGNOSIS — O10.919 CHRONIC HYPERTENSION AFFECTING PREGNANCY: ICD-10-CM

## 2023-03-27 DIAGNOSIS — O09.91 SUPERVISION OF HIGH RISK PREGNANCY IN FIRST TRIMESTER: ICD-10-CM

## 2023-03-27 DIAGNOSIS — Z14.8 CARRIER OF GENETIC DISORDER: ICD-10-CM

## 2023-03-27 DIAGNOSIS — O26.851 SPOTTING AFFECTING PREGNANCY IN FIRST TRIMESTER: ICD-10-CM

## 2023-03-27 LAB
HGB A MFR BLD: 2.3 % (ref 1.8–3.2)
HGB A MFR BLD: 97.7 % (ref 96.4–98.8)
HGB F MFR BLD: 0 % (ref 0–2)
HGB FRACT BLD-IMP: NORMAL
HGB S MFR BLD: 0 %

## 2023-03-27 NOTE — PATIENT INSTRUCTIONS
- Can try tylenol 1000 mg (two extra strength tabs) at once - do not take more than 3000 mg in a 24 hour period  - If tylenol is not effective can try prescription reglan 10 mg, can be taken up to every 6 hours as needed  - If caffeine helps with migraines then caffeine containing medications are OK to take, but should ideally not exceed more than 200 mg of caffeine a day (an 8 oz cup of coffee is ~100 mg of caffeine)  - You can take benadryl 25-50 mg with above medications as they can be helpful when used together, however benadryl can be sedating  - Avoid NSAIDs unless directed by your doctor    - Continue to avoid migraine triggers: maintain a regular meal and sleep pattern, hydrate frequently  - If your migraines are frequent you can try these supplements for prevention: magnesium 400 mg daily, riboflavin 400 mg daily

## 2023-03-27 NOTE — PROGRESS NOTES
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 but denies heavy lifting or strenuous activity       O:  /72 (BP Location: Left arm, Patient Position: Sitting, Cuff Size: Adult)   Pulse 94   Temp 98 1 °F (36 7 °C) (Tympanic)   Ht 5' 11 5\" (1 816 m)   Wt 104 kg (228 lb 9 6 oz)   LMP 2023   SpO2 99%   BMI 31 44 kg/m²       Review of Systems   Constitutional: Negative for appetite change, chills and fever  Eyes: Negative for visual disturbance  Respiratory: Negative for cough, chest tightness and shortness of breath  Cardiovascular: Negative for chest pain  Gastrointestinal: Negative for abdominal distention, abdominal pain, constipation, diarrhea, nausea and vomiting  Endocrine: Negative for cold intolerance and heat intolerance  Genitourinary: Positive for pelvic pain and vaginal bleeding  Negative for difficulty urinating, dyspareunia, dysuria, frequency, genital sores, urgency, vaginal discharge and vaginal pain  Musculoskeletal: Negative for arthralgias  Neurological: Negative for light-headedness and headaches  Hematological: Does not bruise/bleed easily  Psychiatric/Behavioral: Negative for behavioral problems  All other systems reviewed and are negative  Physical Exam  Constitutional:       General: She is not in acute distress  Appearance: Normal appearance  She is not ill-appearing     Genitourinary:      Bladder and urethral meatus normal  " Right Labia: No rash, tenderness, lesions or skin changes  Left Labia: No tenderness, lesions, skin changes or rash  No labial fusion noted  No inguinal adenopathy present in the right or left side  No vaginal discharge, erythema, tenderness, bleeding or ulceration  Vaginal exam comments: Scant tan discharge in vault, no active bleeding   Right Adnexa: not tender, not full and no mass present  Left Adnexa: not tender, not full and no mass present  No cervical motion tenderness, discharge, friability, lesion, polyp or eversion  Uterus is enlarged  Uterus is not fixed, tender or irregular  No uterine mass detected  Pelvic exam was performed with patient in the lithotomy position  HENT:      Head: Normocephalic  Cardiovascular:      Rate and Rhythm: Normal rate  Heart sounds: Normal heart sounds  Pulmonary:      Effort: Pulmonary effort is normal  No accessory muscle usage or respiratory distress  Abdominal:      General: There is no distension  Palpations: Abdomen is soft  There is no mass  Tenderness: There is no abdominal tenderness  There is no guarding or rebound  Musculoskeletal:         General: Normal range of motion  Cervical back: No rigidity  Lymphadenopathy:      Lower Body: No right inguinal adenopathy  No left inguinal adenopathy  Neurological:      General: No focal deficit present  Mental Status: She is alert  Mental status is at baseline  Skin:     General: Skin is warm and dry  Psychiatric:         Mood and Affect: Mood normal          Behavior: Behavior normal    Vitals and nursing note reviewed  Exam conducted with a chaperone present                Fetal Heart Rate: 155    Pregravid Weight/BMI: 91 6 kg (202 lb) (BMI 27 78)  Current Weight: 104 kg (228 lb 9 6 oz)   Total Weight Gain: 12 1 kg (26 lb 9 6 oz)     Pre-Sania Vitals    Flowsheet Row Most Recent Value   Prenatal Assessment    Fetal Heart Rate 155   Movement Absent   Prenatal Vitals    Blood Pressure 126/72   Weight - Scale 104 kg (228 lb 9 6 oz)   Urine Albumin/Glucose    Dilation/Effacement/Station    Cervical Dilation 0   Vaginal Drainage    Edema             Limited transabdominal US with SLIUP,  bpm       Problem List        Digestive    GERD (gastroesophageal reflux disease)       Endocrine    PCOS (polycystic ovarian syndrome)       Respiratory    Mild intermittent asthma without complication    Overview     Inhalers PRN         Asthma    Overview     Inhalers PRN            Cardiovascular and Mediastinum    Chronic hypertension affecting pregnancy    Overview     No meds         Migraine       Genitourinary    Interstitial cystitis    Spotting affecting pregnancy in first trimester    Current Assessment & Plan     Cervix closed, minimal old blood in vault  US today reassuring   Precautions reviewed            Other    History of pulmonary embolism    Overview     lovenox 40 daily         Family history of breast cancer    Overview     PGM @ 78         Mass of lower inner quadrant of left breast    Overview     Going for 6 month f/u US 3/2023         Supervision of high-risk pregnancy    Overview     ASA recommended  Flu vaccine:  completed  Covid vaccine:  completed X 5  NIPS- negative           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    Overview     Patient is a carrier for CF, alpha thalassemia, Eliana Romelia - had carrier screening with [facility] Jacinto  Partner testing:  pending         12 weeks gestation of pregnancy    Depression affecting pregnancy    Overview     Stopped topomax, buspar, prozac with + pregnancy  Behavioral health referral sent         Medication exposure during first trimester of pregnancy    Overview     Stopped topomax with + pregnancy test         Obesity affecting pregnancy    Overview     Pre gestational BMI 30  Early glucola: normal                              Anais Lora MD  3/27/2023  11:05 AM

## 2023-03-28 ENCOUNTER — TELEPHONE (OUTPATIENT)
Dept: PERINATAL CARE | Facility: OTHER | Age: 38
End: 2023-03-28

## 2023-03-28 NOTE — TELEPHONE ENCOUNTER
Left patient a message that her nuchal MFM appointment had to be rescheduled to 4/4 [place] office @ 2:30 PM  The new time, date and location were provided  The patient has been instructed to please call us back at [phone] with any questions or concerns

## 2023-03-29 ENCOUNTER — HOSPITAL ENCOUNTER (OUTPATIENT)
Dept: RADIOLOGY | Facility: CLINIC | Age: 38
Discharge: HOME | End: 2023-03-29

## 2023-03-29 VITALS — BODY MASS INDEX: 29.53 KG/M2 | HEIGHT: 72 IN | WEIGHT: 218 LBS

## 2023-03-29 DIAGNOSIS — N63.24 MASS OF LOWER INNER QUADRANT OF LEFT BREAST: ICD-10-CM

## 2023-03-30 ENCOUNTER — TELEPHONE (OUTPATIENT)
Dept: PSYCHIATRY | Facility: CLINIC | Age: 38
End: 2023-03-30

## 2023-04-03 ENCOUNTER — TELEPHONE (OUTPATIENT)
Dept: PERINATAL CARE | Facility: OTHER | Age: 38
End: 2023-04-03

## 2023-04-03 NOTE — TELEPHONE ENCOUNTER
Spoke with patient and confirmed her nuchal MFM appointment had to be rescheduled to 4/4, [place] office @ 2PM   Patient verbalized understanding of new time, date and location of appointment  Patient denies further questions

## 2023-04-04 ENCOUNTER — ROUTINE PRENATAL (OUTPATIENT)
Dept: PERINATAL CARE | Facility: OTHER | Age: 38
End: 2023-04-04

## 2023-04-04 VITALS
BODY MASS INDEX: 31.22 KG/M2 | DIASTOLIC BLOOD PRESSURE: 70 MMHG | WEIGHT: 227 LBS | HEART RATE: 99 BPM | SYSTOLIC BLOOD PRESSURE: 122 MMHG

## 2023-04-04 DIAGNOSIS — Z36.82 ENCOUNTER FOR (NT) NUCHAL TRANSLUCENCY SCAN: ICD-10-CM

## 2023-04-04 DIAGNOSIS — Z3A.13 13 WEEKS GESTATION OF PREGNANCY: ICD-10-CM

## 2023-04-04 DIAGNOSIS — O99.211 OBESITY AFFECTING PREGNANCY IN FIRST TRIMESTER: ICD-10-CM

## 2023-04-04 DIAGNOSIS — Z86.711 HISTORY OF PULMONARY EMBOLISM: ICD-10-CM

## 2023-04-04 DIAGNOSIS — O10.919 CHRONIC HYPERTENSION AFFECTING PREGNANCY: Primary | ICD-10-CM

## 2023-04-04 NOTE — LETTER
"2023    Korey Mendoza, [address] [place] [address]    Patient: Juliet Vasquez   YOB: 1985   Date of Visit: 2023   Gestational age 13w1d   Miki Cruz of this communication: Routine       Dear Dr Nasreen Garcia,    This patient was seen recently in our  office  The content of my evaluation today is in the ultrasound report under \"OB Procedures\" tab  Please don't hesitate to contact our office with any concerns or questions       Sincerely,      Sandy Ayala MD  Attending Physician, [facility]        "

## 2023-04-10 PROBLEM — Z3A.14 14 WEEKS GESTATION OF PREGNANCY: Status: ACTIVE | Noted: 2023-03-13

## 2023-04-17 PROBLEM — R82.71 GROUP B STREPTOCOCCAL BACTERIURIA: Status: ACTIVE | Noted: 2023-04-17

## 2023-04-25 ENCOUNTER — APPOINTMENT (OUTPATIENT)
Dept: LAB | Facility: CLINIC | Age: 38
End: 2023-04-25

## 2023-04-25 ENCOUNTER — TELEPHONE (OUTPATIENT)
Dept: PSYCHIATRY | Facility: CLINIC | Age: 38
End: 2023-04-25

## 2023-04-25 DIAGNOSIS — O09.92 SUPERVISION OF HIGH RISK PREGNANCY IN SECOND TRIMESTER: ICD-10-CM

## 2023-04-27 LAB
2ND TRIMESTER 4 SCREEN SERPL-IMP: NORMAL
AFP ADJ MOM SERPL: 0.48
AFP INTERP AMN-IMP: NORMAL
AFP INTERP SERPL-IMP: NORMAL
AFP INTERP SERPL-IMP: NORMAL
AFP SERPL-MCNC: 32.2 NG/ML
AGE AT DELIVERY: 39.2 YR
GA METHOD: NORMAL
GA: 23 WEEKS
IDDM PATIENT QL: NO
MULTIPLE PREGNANCY: NO
NEURAL TUBE DEFECT RISK FETUS: NORMAL %

## 2023-05-01 ENCOUNTER — EVALUATION (OUTPATIENT)
Dept: PHYSICAL THERAPY | Facility: CLINIC | Age: 38
End: 2023-05-01

## 2023-05-01 ENCOUNTER — TELEMEDICINE (OUTPATIENT)
Dept: PSYCHIATRY | Facility: CLINIC | Age: 38
End: 2023-05-01

## 2023-05-01 DIAGNOSIS — M54.50 LOW BACK PAIN DURING PREGNANCY IN SECOND TRIMESTER: ICD-10-CM

## 2023-05-01 DIAGNOSIS — F43.22 ADJUSTMENT DISORDER WITH ANXIETY: Primary | ICD-10-CM

## 2023-05-01 DIAGNOSIS — O26.892 LOW BACK PAIN DURING PREGNANCY IN SECOND TRIMESTER: ICD-10-CM

## 2023-05-01 NOTE — PSYCH
"Assessment/Plan: Diagnoses and all orders for this visit:    Adjustment disorder with anxiety      Patient ID: Jovanna Gonzalez is a 44 y o  single female  Subjective:   Rojelio Hernandez states feeling \"ok\"  Estrellita mood presented as within normal limits and affect as normal range and intensity, appropriate  Rojelio Hernandez  is seeking out services for mood changes and anxiety; she is currently pregnant and feels this affecting her moods  She's been on medication for depression and anxiety in the past but is not currently  She felt anxious gaining weight with her pregnancy; she had previously worked hard to lose 118lbs  Problem Assessment:   Rojelio Hernandez  reports that the primary symptoms reported today that are causing distress: mood swings, trouble sleeping, anxious, racing thoughts, crying spells     Estrellita identified goals are to receive support, it's been a long road for her to get pregnant, her current mood swings and also to have support in place after giving birth  History of Present Illness (HPI):  Symptoms began: she's always struggled with anxiety and depression but since going off of medications and becoming pregnant, it's really increased  Severity of symptoms impacted by: weight loss, pregnancy  Pre-morbid level of function and History of Present Illness:   Previous Psychiatric/psychological treatment/year: N/A--hasn't seen one since 2016   Current Psychiatrist/Therapist: hasn't seen a therapist since prior to covid     Outpatient and/or Partial and Other Community Resources Used (CTT, ICM, VNA): Outpatient and inpatient back in 2013 due to feeling suicidal for 4 days          SOCIAL/VOCATION:  Family Constellation (include parents, relationship with each and pertinent Psych/Medical History, addiction/mental illness):   Family History   Problem Relation Age of Onset    Hyperlipidemia Mother     Diabetes Mother     Hypertension Mother     Osteoporosis Mother     Anxiety disorder Mother     Depression " "Mother     Arthritis Mother     Anxiety disorder Father     Depression Father     Asthma Father     Cancer Father         Lung    Deep vein thrombosis Father     Pulmonary embolism Father     Venous thrombosis Father     Heart disease Father     Hyperlipidemia Father     Hypertension Father     COPD Father     Factor V Leiden deficiency Father     Stroke Father     Factor V Leiden deficiency Sister    Marleta Polite Migraines Sister     Thyroid disease Sister     Polycystic ovary syndrome Sister     Diabetes Maternal Grandmother     Osteoporosis Maternal Grandmother     Breast cancer additional onset Paternal Grandmother 80    Breast cancer Paternal Grandmother 79    Cancer Paternal Grandmother         Breast    Diabetes Paternal Grandmother     Prostate cancer Paternal Uncle         Familial Relationships:  Family of origin history: grew up with parents; they are currently ; states very \"stable\" childhood  Mother: very different personalities but close  Father: good relationship; has a lot of health issues  Sibling(s): Younger sister; very close  Spouse: in a relationship; looking to get  before the baby is born  Children: currently pregnant   Previous marriages: Kaushal Ramirez  relates best to her great uncle  Rewalon  lives with significant other  Additional Comments related to family/relationships/peer support: Rewalon reports Her support to include some coworkers and some friends; states she doesn't \"let people see that side of me\"  Domestic Violence: No past history of domestic violence  Christain Sport  denies experiencing domestic violence : N/A      Past History of traumatic events / losses / grief: n/a    Eating habits: no changes  Sleep Habits: a lot of trouble falling asleep, staying asleep--biggest concern currently; her doctor put her on [facility] or Work History (strengths/limitations/needs): / at [employer]; promoted to this " position in October 2022, very high stress job     Highest grade level achieved: Bachelor's degree     history includes N/A     Financial status includes N/A        LEISURE ASSESSMENT   (Include past and present hobbies/interests and level of involvement (Ex: Group/Club Affiliations): N/A   Estrellita's primary language is [place]  Preferred language is [place]  Ethnic and cultural considerations are: Lauren Cole reports she is N/A  Religions affiliations and level of involvement: Lauren Cole  reports she is [place]   Does spirituality help you cope? Yes      FUNCTIONAL STATUS: There has been a recent change in Lauren Cole ability to do the following: does not need van service     Level of Assistance Needed/By Whom?: N/A      Lauren Cole learns best by  reading, listening and demonstration      SUBSTANCE ABUSE ASSESSMENT: no substance abuse       DETOX HISTORY: N/A     Previous detox/rehab treatment: N/A       HEALTH ASSESSMENT: no referral to PCP needed        LEGAL:  Advanced Directive:  No Mental Health Advance Directive or Power of  on file   Past legal history: Lauren Cole denies having a history of arrest, prison/longterm or Wilhemenia Grief  Prenatal History: N/A      Delivery History: N/A       Developmental Milestones: N/A   Temperament as an infant was normal     Temperament as a toddler was normal   Temperament at school age was normal   Temperament as a teenager was normal      Lauren Cole reports that to her knowledge development was atypical, there were no concerns and all milestones were met within normal limits         Risk Assessment:   The following ratings are based on my interview(s) with Estrellita     Risk of Harm to Self:   Demographic risk factors include   Historical Risk Factors include N/A  Recent Specific Risk Factors include inpatient in 2013 for SI  Additional Factors for a Child or Adolescent gender: female (more likely to attempt)  Lauren Cole  denies current or history of self harm or suicidal ideations  Risk of Harm to Others:   Demographic Risk Factors include N/A  Historical Risk Factors include N/A  Recent Specific Risk Factors include N/A  Carlene Goodell  denies current or history of homicidal ideations  Access to Weapons:   Carlene Goodell  denies access to weapons: N/A  The following steps have been taken to ensure weapons are properly secured: N/A      Based on the above information, the client presents the following risk of harm to self or others:  low     The following interventions are recommended:   no intervention changes      Notes regarding this Risk Assessment: (Please make sure to fill this out fully depending on the level of risk and if that risk is medium or high--how are you looking to maintain safety)   Carlene Goodell   is of Minimal risk @  does have a history of suicide attempts, suicidal ideation, or self-injurious behaviors  Carlene Goodell stated she was inpatient for 4 days in 2013 for SI; states she has not struggled with this since then  Carlene Goodell  does not have a history of having homicidal ideations        Review Of Systems:     Mood Normal   Behavior Normal    Thought Content Normal   General Normal , Emotional Problems and Sleep Disturbances   Personality Normal   Other Psych Symptoms Normal   Constitutional As Noted in HPI   ENT As Noted in HPI   Cardiovascular As Noted in HPI   Respiratory As Noted in HPI   Gastrointestinal As Noted in HPI   Genitourinary As Noted in HPI   Musculoskeletal As Noted in HPI   Integumentary As Noted in HPI   Neurological As Noted in HPI   Endocrine Normal             Mental status:  Appearance calm and cooperative , adequate hygiene and grooming and good eye contact    Mood mood appropriate   Affect affect appropriate    Speech a normal rate   Thought Processes normal thought processes   Hallucinations no hallucinations present    Thought Content no delusions   Abnormal Thoughts no suicidal thoughts  and no homicidal thoughts    Orientation  oriented to person and place and time   Remote Memory short term memory intact and long term memory intact   Attention Span concentration intact   Intellect Appears to be of Average Intelligence and Not [address] of Knowledge displays adequate knowledge of current events, adequate fund of knowledge regarding past history and adequate fund of knowledge regarding vocabulary    Insight Insight intact   Judgement judgment was intact   Muscle Strength N/A   Language no difficulty naming common objects and no difficulty repeating a phrase    Pain none   Pain Scale 0      Virtual Regular Visit    Verification of patient location:    Patient is located at Other in the following state in which I hold an active license [place]      Assessment/Plan:    Problem List Items Addressed This Visit    None  Visit Diagnoses     Adjustment disorder with anxiety    -  Primary          Goals addressed in session: Goal 1          Reason for visit is No chief complaint on file  Encounter provider Chava Santos LCSW    Provider located at [address] [place] [id number] [phone]      Recent Visits  Date Type Provider Dept   04/25/23 Telephone Chava Santos [address] [place] [address] recent visits within past 7 days and meeting all other requirements  Today's Visits  Date Type Provider Dept   05/01/23 Telemedicine Chava Santos LCSW [facility]   Showing today's visits and meeting all other requirements  Future Appointments  No visits were found meeting these conditions  Showing future appointments within next 150 days and meeting all other requirements       The patient was identified by name and date of birth  Feng Katz was informed that this is a telemedicine visit and that the visit is being conducted throughthe [employer] platform  She agrees to proceed     My office door was closed  No one else was in the room  She acknowledged consent and understanding of privacy and security of the video platform  The patient has agreed to participate and understands they can discontinue the visit at any time  Patient is aware this is a billable service  Anita Esposito is a 44 y o  female  HPI     Past Medical History:   Diagnosis Date    Asthma     Depression     GERD (gastroesophageal reflux disease)     Hypertension     IC (interstitial cystitis)     Migraines     PCOS (polycystic ovarian syndrome)     Pulmonary embolism ([facility] [address] ) 2009    Venous insufficiency        Past Surgical History:   Procedure Laterality Date    ADENOIDECTOMY      CHOLECYSTECTOMY      LAPAROSCOPY      TONSILLECTOMY         Current Outpatient Medications   Medication Sig Dispense Refill    albuterol (Ventolin HFA) 90 mcg/act inhaler Inhale 2 puffs every 4 (four) hours as needed for wheezing or shortness of breath (Patient not taking: Reported on 2/21/2023) 1 Inhaler 1    aspirin (ECOTRIN LOW STRENGTH) 81 mg EC tablet Take 2 tablets (162 mg total) by mouth daily 180 tablet 1    enoxaparin (LOVENOX) 40 mg/0 4 mL Inject 0 4 mL (40 mg total) under the skin in the morning for 30 doses 12 mL 3    MAGNESIUM CITRATE PO Take by mouth      metoclopramide (Reglan) 10 mg tablet Take 1 tablet (10 mg total) by mouth every 6 (six) hours as needed (headache, nausea) 30 tablet 0    Prenatal Vit-Fe Fumarate-FA (PRENATAL VITAMINS PO) Take by mouth      Pyridoxine HCl (VITAMIN B6 PO) Take by mouth       No current facility-administered medications for this visit          Allergies   Allergen Reactions    Erythromycin Swelling    Ethylenediamine Dermatitis, Hives, Itching and Swelling    Propolis Dermatitis, Hives, Itching, Swelling and Rash    Sumatriptan Chest Pain and Palpitations     to all TRIPTANS    Triptans Palpitations     flushing       Review of Systems    Video Exam    There were no vitals filed for this visit      Physical Exam     05/01/23  Start Time: 1106  Stop Time: 0642  Total Visit Time: 58 minutes

## 2023-05-01 NOTE — PROGRESS NOTES
PT Evaluation     Today's date: 2023  Patient name: Priyanka Villasenor  : 1985  MRN: [id number]  Referring provider: Josh Fernando PA-C  Dx:   Encounter Diagnosis     ICD-10-CM    1  Low back pain during pregnancy in second trimester  O26 892 Ambulatory referral to Physical Therapy    M54 50                      Assessment  Assessment details: Patient is a 44 y o  female, 17 weeks pregnant with her first child, who presents to PT with LBP that radiates to the right  She does demonstrate limited flexibility of the into hip IR and piriformis musculature  Patient has good strength of the BLE  She does have mild strength deficits of the abdominal musculature at 3+-4/5  Patient has tenderness upon palpation to the right SI joint  Patient does have a PMH significant for IC and BV  Impairments: abnormal or restricted ROM, lacks appropriate home exercise program and pain with function    Symptom irritability: highUnderstanding of Dx/Px/POC: excellent  Goals  STG - 2 weeks  Patient able to tolerate sitting for 60 minutes without increased LBP  Patient to report pain at worst 4/10 with daily activity  Patient to have increased flexibility of the B hips into IR  by 5-10 degrees  Patient to be independent with HEP  LTG - 6 weeks  Patient able to demonstrate painfree AROM fo the L/S  Patient able to demonstrate good posture in sitting and standing  Patient to demonstrate core strength at  4/5   Patient to have minimal to no tenderness with palpation to the right SI joint  Patient to be independent with final HEP  Plan  Plan details: Patient's evaluation is completed  Patient was instructed with a HEP this date  Patient has scheduled further PT sessions for treatment     Patient would benefit from: skilled physical therapy  Planned modality interventions: cryotherapy and thermotherapy: hydrocollator packs  Planned therapy interventions: manual therapy, neuromuscular re-education, therapeutic activities, therapeutic exercise and home exercise program  Frequency: 2x week  Duration in weeks: 6  Plan of Care beginning date: 2023  Plan of Care expiration date: 2023        Subjective Evaluation    History of Present Illness  Mechanism of injury: Patient notes that she is pregnant and has pain in the low back that radiates to the right that is jabbing in nature  She notes a hx of pelvic floor issues  She notes that she does have a hx of scoliosis  Patient notes that symptoms started in March  She is 17 weeks into her pregnancy  She has a hx of IC ()  Hx of bacterial vaginosis (2016)  She notes that she has had issues that were so severe that she was unable to urinary due to pain  Vaginal pressure symptoms currently  She notes burning symptoms  She notes that she does try to get up and move every hour at work  She does not tolerate R sidely  She does have pain with penetration that decreases with time  Every two hours she is urinating  She primarily drink water  She has tried a physioball to roll her hips - self stretching     Pain  Current pain ratin  At best pain ratin  At worst pain ratin  Location: Right LB   Quality: throbbing and radiating  Relieving factors: heat, medications and support    Social Support  Lives in: multiple-level home  Lives with: significant other    Employment status: working  Patient Goals  Patient goals for therapy: decreased pain          Objective     Postural Observations  Seated posture: good  Standing posture: good        Tenderness     Additional Tenderness Details  Tenderness is noted with palpation to the right SI joint    Neurological Testing     Sensation     Lumbar   Left   Intact: light touch    Right   Intact: light touch    Active Range of Motion     Lumbar   Flexion:  Restriction level: minimal  Extension:  WFL  Left lateral flexion:  WFL  Right lateral flexion:  WFL  Left rotation:  WFL  Right rotation:  [facility]    Strength/Myotome Testing "    Left Hip   Planes of Motion   Flexion: 4+  Extension: 4+  Abduction: 4+  Adduction: 4+  External rotation: 4+  Internal rotation: 4+    Right Hip   Planes of Motion   Flexion: 4+  Extension: 4+  Abduction: 4+  Adduction: 4+  External rotation: 4+  Internal rotation: 4+    Left Knee   Flexion: 4+  Extension: 4+    Right Knee   Flexion: 4+  Extension: 4+    General Comments:      Lumbar Comments  Patient demonstrates mild swelling into the BLE  Flexibility-  HS flexibility to 85 degrees B  Piriformis flexibility decreased 25%  Hip IR limited B - R 20-25 degrees, Left 15-20 degrees             Precautions: PREGNANCY      Daily Treatment Diary:      Initial Evaluation Date: 05/01/23  Compliance 5/1                     Visit Number 1                    Re-Eval  IE                 [facility]   Foto Captured Y                           5/1                     Manual                      Stretch IR/ Piriformis 30\" x5                     STM -                     Kinesio tape -                     Ther-Ex                      LTR                      TA set                      TA with march                      TA with PF                       Supine clamshell                      Supine add                                                                                                              Neuro Re-Ed                                                                                                Ther-Act                                                               Modalities                      HP/ CP                                                     Access Code: [email or web address]  URL: [email or web address]  Date: 05/01/2023  Prepared by: Kristen Dejesus    Exercises  - Caregiver PROM Hip Internal Rotation  - 1 x daily - 7 x weekly - 1 sets - 5 reps - 20-30 hold  - Self IR stretch  - 1 x daily - 7 x weekly - 1 sets - 10 reps - 10 hold  - Supine Piriformis Stretch with Foot on Ground  - 1 x daily - 7 x " weekly - 1 sets - 5 reps - 20-30 hold

## 2023-05-04 ENCOUNTER — OFFICE VISIT (OUTPATIENT)
Dept: PHYSICAL THERAPY | Facility: CLINIC | Age: 38
End: 2023-05-04

## 2023-05-04 DIAGNOSIS — M54.50 LOW BACK PAIN DURING PREGNANCY IN SECOND TRIMESTER: Primary | ICD-10-CM

## 2023-05-04 DIAGNOSIS — O26.892 LOW BACK PAIN DURING PREGNANCY IN SECOND TRIMESTER: Primary | ICD-10-CM

## 2023-05-04 NOTE — PROGRESS NOTES
"Daily Note     Today's date: 2023  Patient name: Zaira Feng  : 1985  MRN: [id number]  Referring provider: Erickson Betancourt PA-C  Dx:   Encounter Diagnosis     ICD-10-CM    1  Low back pain during pregnancy in second trimester  O26 892     M54 50                      Subjective: SPR 8/10 this date in the right LBP  Objective: See treatment diary below      Assessment: Tolerated treatment well  Patient would benefit from continued PT  Patient able to tolerate passive and active stretching  She was able to complete low level core stabilization exercise this date  Plan: Continue per plan of care  Precautions: PREGNANCY      Daily Treatment Diary:      Initial Evaluation Date: 23  Compliance                    Visit Number 1 2                   Re-Eval  IE -                MC   Foto Captured Y -                                             Manual                      Stretch IR/ Piriformis 30\" x5  30\" x5                   STM -  left sidely 10 min                   Kinesio tape -                     Ther-Ex                      LTR   5\" x10                   TA set with PPT   2 min                   TA with march   2 min                                         Supine clamshell   Blue 2 min                   Supine add   2 min                   Standing hip flexor stretch   20\" x3 ea                   BKFO                                                                  Neuro Re-Ed                                                                                                Ther-Act                                                               Modalities                      HP/ CP  HP in left sidely x10 min                                                   Access Code: [email or web address]  URL: [email or web address]  Date: 2023  Prepared by: Shonna Lindo    Exercises  - Caregiver PROM Hip Internal Rotation  - 1 x daily - 7 x weekly - 1 sets - 5 reps " - 20-30 hold  - Self IR stretch  - 1 x daily - 7 x weekly - 1 sets - 10 reps - 10 hold  - Supine Piriformis Stretch with Foot on Ground  - 1 x daily - 7 x weekly - 1 sets - 5 reps - 20-30 hold

## 2023-05-05 ENCOUNTER — ROUTINE PRENATAL (OUTPATIENT)
Age: 38
End: 2023-05-05

## 2023-05-05 VITALS
HEIGHT: 72 IN | WEIGHT: 238.6 LBS | SYSTOLIC BLOOD PRESSURE: 128 MMHG | HEART RATE: 89 BPM | BODY MASS INDEX: 32.32 KG/M2 | DIASTOLIC BLOOD PRESSURE: 82 MMHG

## 2023-05-05 DIAGNOSIS — O99.340 DEPRESSION AFFECTING PREGNANCY: ICD-10-CM

## 2023-05-05 DIAGNOSIS — Z14.8 CARRIER OF GENETIC DISORDER: ICD-10-CM

## 2023-05-05 DIAGNOSIS — O09.92 SUPERVISION OF HIGH RISK PREGNANCY IN SECOND TRIMESTER: ICD-10-CM

## 2023-05-05 DIAGNOSIS — R82.71 GROUP B STREPTOCOCCAL BACTERIURIA: ICD-10-CM

## 2023-05-05 DIAGNOSIS — O10.919 CHRONIC HYPERTENSION AFFECTING PREGNANCY: ICD-10-CM

## 2023-05-05 DIAGNOSIS — O99.212 OBESITY AFFECTING PREGNANCY IN SECOND TRIMESTER: ICD-10-CM

## 2023-05-05 DIAGNOSIS — Z3A.17 17 WEEKS GESTATION OF PREGNANCY: ICD-10-CM

## 2023-05-05 DIAGNOSIS — O26.851 SPOTTING AFFECTING PREGNANCY IN FIRST TRIMESTER: Primary | ICD-10-CM

## 2023-05-05 DIAGNOSIS — O09.891 MEDICATION EXPOSURE DURING FIRST TRIMESTER OF PREGNANCY: ICD-10-CM

## 2023-05-05 DIAGNOSIS — F32.A DEPRESSION AFFECTING PREGNANCY: ICD-10-CM

## 2023-05-05 NOTE — PROGRESS NOTES
"    S: 44 y o  Sam Clifton 17w5d here for routine prenatal visit  Chief Complaint   Patient presents with    Routine Prenatal Visit     Patient is having a lot of back pain, did start PT this week has had 2 sessions  Patient states she does not feel baby move as much as before          OB complaints:  Contractions: no  Leakage: no  Bleeding: no  Fetal movement: yes      O:  /82   Pulse 89   Ht 5' 11 5\" (1 816 m)   Wt 108 kg (238 lb 9 6 oz)   LMP 2023   BMI 32 81 kg/m²       Review of Systems   Constitutional: Negative for chills and fever  Eyes: Negative for visual disturbance  Respiratory: Negative for chest tightness and shortness of breath  Cardiovascular: Negative for chest pain  Gastrointestinal: Negative for abdominal pain, diarrhea, nausea and vomiting  Genitourinary: Negative for pelvic pain and vaginal bleeding  As noted in HPI   Skin: Negative for rash  Neurological: Negative for headaches  All other systems reviewed and are negative  Physical Exam  Constitutional:       General: She is not in acute distress  Appearance: Normal appearance  HENT:      Head: Normocephalic and atraumatic  Cardiovascular:      Rate and Rhythm: Normal rate  Pulmonary:      Effort: Pulmonary effort is normal  No respiratory distress  Abdominal:      General: There is no distension  Palpations: Abdomen is soft  Tenderness: There is no abdominal tenderness  There is no guarding or rebound  Neurological:      General: No focal deficit present  Mental Status: She is alert  Psychiatric:         Mood and Affect: Mood normal          Behavior: Behavior normal    Vitals and nursing note reviewed                Fetal Heart Rate: 140    Pregravid Weight/BMI: 91 6 kg (202 lb) (BMI 27 78)  Current Weight: 108 kg (238 lb 9 6 oz)   Total Weight Gain: 16 6 kg (36 lb 9 6 oz)     Pre- Vitals    Flowsheet Row Most Recent Value   Prenatal Assessment    Fetal Heart " Rate 140   Movement Present   Prenatal Vitals    Blood Pressure 128/82   Weight - Scale 108 kg (238 lb 9 6 oz)   Urine Albumin/Glucose    Dilation/Effacement/Station    Vaginal Drainage    Edema             Results for orders placed or performed in visit on 04/25/23   Alpha fetoprotein, maternal   Result Value Ref Range    Results Report     TEST RESULTS (AFP) *Screen Negative*     Gestational Age 23 0 weeks    Gestat  Age Based On Ultrasound     Maternal Age At ALIZE 42 2 yr    Race      Weight 233 lbs    Insulin Dep  Diabetic No     Multiple Gestation No     AFP 32 2 ng/mL    MSAFP Mom 0 48     Osb Risk 38364     MSAFP Interp Comment     AF, AFP Comments Comment          Problem List        Digestive    GERD (gastroesophageal reflux disease)       Endocrine    PCOS (polycystic ovarian syndrome)       Respiratory    Mild intermittent asthma without complication    Overview     Inhalers PRN         Asthma    Overview     Inhalers PRN            Cardiovascular and Mediastinum    Chronic hypertension affecting pregnancy    Overview     No meds currently   Was previously on lisinopril and metoprolol - after weight loss no metoprolol   Stopped lisinopril January 2023   Baseline labs normal, UPC undetectable   Aspirin recommended         Current Assessment & Plan     Taking aspirin   Home BP's 120's/80's, max 's  No current signs/sx preeclampsia          Migraine       Genitourinary    Interstitial cystitis       Other    History of pulmonary embolism    Overview     This was unprovoked in the setting of oral contraceptive use  She had a thorough thrombophilia work-up which can be seen in her lab reports from August 18, 2022 which was negative for inherited or acquired thrombophilia    She is recommended to continue Lovenox prophylaxis throughout her pregnancy and 6 weeks postpartum         Current Assessment & Plan     Taking without issue          Family history of breast cancer    Overview     PGM @ 78 Mass of lower inner quadrant of left breast    Overview      US 3/2023: Previously noted area of probable fat necrosis in the 7 o'clock position of the left breast, 5 cm from the nipple has significantly decreased in size currently measuring 5 x 4 x 7 mm  6 month f/u scheduled 10/4/23         Supervision of high-risk pregnancy    Overview     ASA recommended  Flu vaccine:  completed  Covid vaccine:  completed X 5  NIPS/ MsAFP- negative           Current Assessment & Plan     Seeing PT for back pain  Has anatomy US scheduled   OB precautions reviewed         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    Overview     Patient is a carrier for CF, alpha thalassemia, Paulina Sprout - had carrier screening with Yesi Jackson  Partner testing:  pending         17 weeks gestation of pregnancy    Depression affecting pregnancy    Overview     Stopped topomax, buspar, prozac with + pregnancy  Behavioral health referral sent         Current Assessment & Plan     Resumed therapy          Medication exposure during first trimester of pregnancy    Overview     Stopped topomax with + pregnancy test         Obesity affecting pregnancy    Overview     Pre gestational BMI 30  Early glucola: normal         Group B streptococcal bacteriuria    Overview     + GBS in urine @ 14 weeks:    Will need to treat in labor                              Anais Bledsoe MD  5/5/2023  11:29 AM

## 2023-05-08 ENCOUNTER — OFFICE VISIT (OUTPATIENT)
Dept: PHYSICAL THERAPY | Facility: CLINIC | Age: 38
End: 2023-05-08

## 2023-05-08 DIAGNOSIS — O26.892 LOW BACK PAIN DURING PREGNANCY IN SECOND TRIMESTER: Primary | ICD-10-CM

## 2023-05-08 DIAGNOSIS — M54.50 LOW BACK PAIN DURING PREGNANCY IN SECOND TRIMESTER: Primary | ICD-10-CM

## 2023-05-08 NOTE — PROGRESS NOTES
"Daily Note     Today's date: 2023  Patient name: Gayathri Nuñez  : 1985  MRN: [id number]  Referring provider: Kelly Guzman PA-C  Dx:   Encounter Diagnosis     ICD-10-CM    1  Low back pain during pregnancy in second trimester  O26 892     M54 50                      Subjective: Patient notes pain is persistent in the L/S and radiating into the RLE  Patient notes that she did tolerate last session well  Objective: See treatment diary below      Assessment: Tolerated treatment well  Patient would benefit from continued PT  Patient was able to advance to cat/cow and lucrecia pose stretching this date in an effort to decrease pain and improve motion of the spine  She was able to tolerate stretching and STM well with decreased pain intensity  Plan: Continue per plan of care        Precautions: PREGNANCY      Daily Treatment Diary:      Initial Evaluation Date: 23  Compliance                  Visit Number 1 2  3                 Re-Eval  IE -  -              MC   Foto Captured Y -  -                                         Manual                      Stretch IR/ Piriformis 30\" x5  30\" x5  30\" x5                 STM -  left sidely 10 min  10 min                 Kinesio tape -                     Ther-Ex                      LTR   5\" x10  5\" x10                 TA set with PPT   2 min  2 min                 TA with march   2 min  -                                       Supine clamshell   Blue 2 min  ----->                 Supine add   2 min  ---->                 Standing hip flexor stretch   20\" x3 ea  --->                 BKFO     x10                 Cat cow     5\" x5                 Lucrecia pose     5\" x5                 Neuro Re-Ed                                                                                                Ther-Act                                                               Modalities                      HP/ CP  HP in left sidely x10 min HP " in left slidely x10 min                                                  Access Code: [email or web address]  URL: [email or web address]  Date: 05/01/2023  Prepared by: Modesta Rosales    Exercises  - Caregiver PROM Hip Internal Rotation  - 1 x daily - 7 x weekly - 1 sets - 5 reps - 20-30 hold  - Self IR stretch  - 1 x daily - 7 x weekly - 1 sets - 10 reps - 10 hold  - Supine Piriformis Stretch with Foot on Ground  - 1 x daily - 7 x weekly - 1 sets - 5 reps - 20-30 hold

## 2023-05-11 ENCOUNTER — TELEMEDICINE (OUTPATIENT)
Dept: PSYCHIATRY | Facility: CLINIC | Age: 38
End: 2023-05-11

## 2023-05-11 ENCOUNTER — OFFICE VISIT (OUTPATIENT)
Dept: PHYSICAL THERAPY | Facility: CLINIC | Age: 38
End: 2023-05-11

## 2023-05-11 DIAGNOSIS — O26.892 LOW BACK PAIN DURING PREGNANCY IN SECOND TRIMESTER: Primary | ICD-10-CM

## 2023-05-11 DIAGNOSIS — M54.50 LOW BACK PAIN DURING PREGNANCY IN SECOND TRIMESTER: Primary | ICD-10-CM

## 2023-05-11 DIAGNOSIS — F43.22 ADJUSTMENT DISORDER WITH ANXIETY: Primary | ICD-10-CM

## 2023-05-11 NOTE — PSYCH
Virtual Regular Visit    Verification of patient location:    Patient is located at Home in the following state in which I hold an active license [place]      Assessment/Plan:    Problem List Items Addressed This Visit    None  Visit Diagnoses     Adjustment disorder with anxiety    -  Primary          Goals addressed in session: Goal 1          Reason for visit is No chief complaint on file  Encounter provider Nikita May LCSW    Provider located at [address] [place] [id number] [phone]      Recent Visits  No visits were found meeting these conditions  Showing recent visits within past 7 days and meeting all other requirements  Today's Visits  Date Type Provider Dept   05/11/23 Telemedicine Nikita May LCSW [facility]   Showing today's visits and meeting all other requirements  Future Appointments  No visits were found meeting these conditions  Showing future appointments within next 150 days and meeting all other requirements       The patient was identified by name and date of birth  Ramana Aurora was informed that this is a telemedicine visit and that the visit is being conducted throughthe [employer]um[employer] platform  She agrees to proceed     My office door was closed  No one else was in the room  She acknowledged consent and understanding of privacy and security of the video platform  The patient has agreed to participate and understands they can discontinue the visit at any time  Patient is aware this is a billable service  Tristin Pena is a 43 y o  female        HPI     Past Medical History:   Diagnosis Date   • Asthma    • Depression    • GERD (gastroesophageal reflux disease)    • Hypertension    • IC (interstitial cystitis)    • Migraines    • PCOS (polycystic ovarian syndrome)    • Pulmonary embolism ([facility] [address] ) 2009   • Venous insufficiency "      Past Surgical History:   Procedure Laterality Date   • ADENOIDECTOMY     • CHOLECYSTECTOMY     • LAPAROSCOPY     • TONSILLECTOMY         Current Outpatient Medications   Medication Sig Dispense Refill   • albuterol (Ventolin HFA) 90 mcg/act inhaler Inhale 2 puffs every 4 (four) hours as needed for wheezing or shortness of breath (Patient not taking: Reported on 2/21/2023) 1 Inhaler 1   • aspirin (ECOTRIN LOW STRENGTH) 81 mg EC tablet Take 2 tablets (162 mg total) by mouth daily 180 tablet 1   • enoxaparin (LOVENOX) 40 mg/0 4 mL Inject 0 4 mL (40 mg total) under the skin in the morning for 30 doses 12 mL 3   • MAGNESIUM CITRATE PO Take by mouth     • metoclopramide (Reglan) 10 mg tablet Take 1 tablet (10 mg total) by mouth every 6 (six) hours as needed (headache, nausea) 30 tablet 0   • Prenatal Vit-Fe Fumarate-FA (PRENATAL VITAMINS PO) Take by mouth     • Pyridoxine HCl (VITAMIN B6 PO) Take by mouth       No current facility-administered medications for this visit  Allergies   Allergen Reactions   • Erythromycin Swelling   • Ethylenediamine Dermatitis, Hives, Itching and Swelling   • Propolis Dermatitis, Hives, Itching, Swelling and Rash   • Sumatriptan Chest Pain and Palpitations     to all TRIPTANS   • Triptans Palpitations     flushing       Review of Systems    Video Exam    There were no vitals filed for this visit  Physical Exam     Behavioral Health Psychotherapy Progress Note    Psychotherapy Provided: Individual Psychotherapy      Diagnosis ICD-10-CM Associated Orders   1  Adjustment disorder with anxiety  F43 22            Goals addressed in session: Goal 1     DATA: Met with Mk Dotson for a scheduled individual session  Topics discussed included mental health condition(s), emotional wellness, coping skills, relationship with significant other and work-related stress  Rose Barnett is feeling \"overwhelmed\"   Today is her birthday; her office is filled with balloons which she feels was " "really sweet of her coworkers  She started physical therapy and she's glad to get some exercises to help give her some relief  We talked about putting her thoughts out on to paper; listening to her thoughts, the ones on a \"loop\" that keep coming up that she has shoved to the back  It's helpful to lay all of that out, visualize it and sit with it  We also talked at length about addressing those thoughts, and communication with her   Arnold Sepulveda shows evidence of utilizing effective communication skills, identifying healthy outlets to manage mental health symptoms and engaging in problem solving to manage mental health symptoms  During this session, this clinical used the following therapeutic modalities engagement strategies, supportive psychotherapy, CBT techniques and stress management skills  Substance Abuse was not addressed during this session  If the client is diagnosed with a co-occurring substance use disorder, please indicate any changes in the frequency or amount of use: N/A  Stage of change for addressing substance use diagnoses: No substance use/Not applicable    ASSESSMENT:  Arnold Sepulveda presents with a Euthymic/ normal mood  her affect is normal range and intensity, appropriate, which is congruent, with his mood and the content of the session  Arnold Sepulveda was oriented x3  She was focused and engaged  Arnold Sepulveda exhibits early engagement with this clinician  The client has not yet made progress on their goals  Artemio Clipkameron presents with a none risk of suicide, none risk of self-harm, and none risk of harm to others  For any risk assessment that surpasses a \"low\" rating, a safety plan must be developed  A safety plan was indicated: no  If yes, describe in detail: N/A    PLAN: Arnold Sepulveda will return in 1 week for the next scheduled session  At the next session, the therapist will use supportive psychotherapy and client-centered therapy to address her anxiety      Behavioral Health Treatment Plan " and Discharge Planning: Francisco Javieravery [place] is aware of and agrees to continue to work on their treatment plan  They have identified and are working toward their discharge goals   yes    Visit start and stop times:    05/11/23  Start Time: 1059  Stop Time: 1151  Total Visit Time: 52 minutes

## 2023-05-11 NOTE — PROGRESS NOTES
"Daily Note     Today's date: 2023  Patient name: Esmer Tatum  : 1985  MRN: [id number]  Referring provider: Amaury Macias PA-C  Dx:   Encounter Diagnosis     ICD-10-CM    1  Low back pain during pregnancy in second trimester  O26 892     M54 50                      Subjective: Patient notes that she needed to complete prolonged sitting yesterday for a work event that resulted in increased buttocks pain symptoms  Pain symptoms are increased this date as well 7/10  Objective: See treatment diary below      Assessment: Tolerated treatment well  Patient would benefit from continued PT  Patient is tender and demonstrates muscular hypertonicity of the right piriformis musculature  Patient is able to complete self stretching with decreased pain noted with teddy pose and PPT  Patient does respond well to PT intervention however pain does return with prolonged static positions  Plan: Continue per plan of care        Precautions: PREGNANCY      Daily Treatment Diary:      Initial Evaluation Date: 23  Compliance                Visit Number 1 2  3  4               Re-Eval  IE -  -  -               Foto Captured Y -  -  -                                     Manual                      Stretch IR/ Piriformis 30\" x5  30\" x5  30\" x5  30\"x5 IR/Piriformis/ SKTC/ HS               STM -  left sidely 10 min  10 min  15 min               Kinesio tape -                     Ther-Ex                      LTR   5\" x10  5\" x10  5\"x10               TA set with PPT   2 min  2 min  5\" x15               TA with march   2 min  -                                       Supine clamshell   Blue 2 min  ----->  --->               Supine add   2 min  ---->  --->               Standing hip flexor stretch   20\" x3 ea  --->  ---->               BKFO     x10  x10               Cat cow     5\" x5  5\"x10               Teddy pose     5\" x5  5\" x10               Neuro Re-Ed               " Ther-Act                                                               Modalities                      HP/ CP  HP in left sidely x10 min HP in left slidely x10 min                                                  Access Code: [email or web address]  URL: [email or web address]  Date: 05/01/2023  Prepared by: Modesta Rosales    Exercises  - Caregiver PROM Hip Internal Rotation  - 1 x daily - 7 x weekly - 1 sets - 5 reps - 20-30 hold  - Self IR stretch  - 1 x daily - 7 x weekly - 1 sets - 10 reps - 10 hold  - Supine Piriformis Stretch with Foot on Ground  - 1 x daily - 7 x weekly - 1 sets - 5 reps - 20-30 hold

## 2023-05-15 ENCOUNTER — APPOINTMENT (OUTPATIENT)
Dept: PHYSICAL THERAPY | Facility: CLINIC | Age: 38
End: 2023-05-15
Payer: COMMERCIAL

## 2023-05-17 ENCOUNTER — OFFICE VISIT (OUTPATIENT)
Dept: PHYSICAL THERAPY | Facility: CLINIC | Age: 38
End: 2023-05-17

## 2023-05-17 DIAGNOSIS — O26.892 LOW BACK PAIN DURING PREGNANCY IN SECOND TRIMESTER: Primary | ICD-10-CM

## 2023-05-17 DIAGNOSIS — M54.50 LOW BACK PAIN DURING PREGNANCY IN SECOND TRIMESTER: Primary | ICD-10-CM

## 2023-05-17 NOTE — PROGRESS NOTES
"Daily Note     Today's date: 2023  Patient name: Tiffanie Dey  : 1985  MRN: [id number]  Referring provider: Simone Moore PA-C  Dx:   Encounter Diagnosis     ICD-10-CM    1  Low back pain during pregnancy in second trimester  O26 892     M54 50                      Subjective: Patient notes that pain continues to be present daily  She has developed vaginal tingling symptoms as well which she will address with OB  Patient notes she is now using [employer] which is somewhat helpful  Objective: See treatment diary below      Assessment: Tolerated treatment well  Patient would benefit from continued PT  Initiated Kinesio taping along the erector spinea to help with muscular pain symptoms  Patient does respond well to PT intervention however pain does return some hours later in the L/S and into the RLE  Plan: Continue per plan of care        Precautions: PREGNANCY      Daily Treatment Diary:      Initial Evaluation Date: 23  Compliance              Visit Number 1 2  3  4  5             Re-Eval  IE -  -  -  -             Foto Captured Y -  -  -  -                                 Manual                      Stretch IR/ Piriformis 30\" x5  30\" x5  30\" x5  30\"x5 IR/Piriformis/ SKTC/ HS  30\"x5IR/ Piriformis/ SKTC/ HS 15 min             STM -  left sidely 10 min  10 min  15 min  10 min             Kinesio tape -        5 min 2 I strips on erector spinea             Ther-Ex                      LTR   5\" x10  5\" x10  5\"x10  5\" x10             TA set with PPT   2 min  2 min  5\" x15  5\" x15             TA with march   2 min  -                 Self IR stretch         10\" x5             Supine clamshell   Blue 2 min  ----->  --->               Supine add   2 min  ---->  --->               Standing hip flexor stretch   20\" x3 ea  --->  ---->               BKFO     x10  x10               Cat cow     5\" x5  5\"x10               Lucrecia pose     5\" x5 " " 5\" x10               Neuro Re-Ed                                                                                                Ther-Act                                                               Modalities                      HP/ CP  HP in left sidely x10 min HP in left slidely x10 min                                                  Access Code: [email or web address]  URL: [email or web address]  Date: 05/01/2023  Prepared by: Zohreh Fox    Exercises  - Caregiver PROM Hip Internal Rotation  - 1 x daily - 7 x weekly - 1 sets - 5 reps - 20-30 hold  - Self IR stretch  - 1 x daily - 7 x weekly - 1 sets - 10 reps - 10 hold  - Supine Piriformis Stretch with Foot on Ground  - 1 x daily - 7 x weekly - 1 sets - 5 reps - 20-30 hold                 "

## 2023-05-18 ENCOUNTER — TELEPHONE (OUTPATIENT)
Dept: OBGYN CLINIC | Facility: CLINIC | Age: 38
End: 2023-05-18

## 2023-05-18 NOTE — TELEPHONE ENCOUNTER
Patients partner calling in regards to stating they have handed off paperwork for her partner Malaika Carolina to be able to attend her appts  His work place states they have not received it  Stated to the patient partner I will keep them posted once we locate the paper work

## 2023-05-19 ENCOUNTER — TELEMEDICINE (OUTPATIENT)
Dept: PSYCHIATRY | Facility: CLINIC | Age: 38
End: 2023-05-19

## 2023-05-19 ENCOUNTER — PATIENT MESSAGE (OUTPATIENT)
Age: 38
End: 2023-05-19

## 2023-05-19 DIAGNOSIS — F43.22 ADJUSTMENT DISORDER WITH ANXIETY: Primary | ICD-10-CM

## 2023-05-19 PROBLEM — O09.512 PRIMIGRAVIDA OF ADVANCED MATERNAL AGE IN SECOND TRIMESTER: Status: ACTIVE | Noted: 2023-05-19

## 2023-05-19 NOTE — TELEPHONE ENCOUNTER
Made pt aware unfortunately paperwork can not be located and apologized for inconvience  Pt will mychart message us the paperwork to be filled out and will get it filled out ASAP

## 2023-05-19 NOTE — PATIENT INSTRUCTIONS
Thank you for choosing us for your  care today  If you have any questions about your ultrasound or care, please do not hesitate to contact us or your primary obstetrician  Some general instructions for your pregnancy are:    Exercise: Aim for 22 minutes per day (150 minutes per week) of regular exercise  Walking is great! Nutrition: Choose healthy sources of calcium, iron, and protein  Learn about Preeclampsia: preeclampsia is a common, serious high blood pressure complication in pregnancy  A blood pressure of 026CFXF (systolic or top number) or 47EVGZ (diastolic or bottom number) is not normal and needs evaluation by your doctor  Aspirin is sometimes prescribed in early pregnancy to prevent preeclampsia in women with risk factors - ask your obstetrician if you should be on this medication  For more resources, visit:  [employer] fi  If you smoke, try to reduce how many cigarettes you smoke or try to quit completely  Do not vape  Other warning signs to watch out for in pregnancy or postpartum: chest pain, obstructed breathing or shortness of breath, seizures, thoughts of hurting yourself or your baby, bleeding, a painful or swollen leg, fever, or headache (see [facility] POST-BIRTH Warning Signs campaign)  If these happen call 911  Itching is also not normal in pregnancy and if you experience this, especially over your hands and feet, potentially worse at night, notify your doctors

## 2023-05-19 NOTE — PSYCH
Virtual Regular Visit    Verification of patient location:    Patient is located at Home in the following state in which I hold an active license [place]      Assessment/Plan:    Problem List Items Addressed This Visit    None  Visit Diagnoses     Adjustment disorder with anxiety    -  Primary          Goals addressed in session: Goal 1          Reason for visit is No chief complaint on file  Encounter provider Dede Arboleda LCSW    Provider located at [address] [place] [id number] [phone]      Recent Visits  No visits were found meeting these conditions  Showing recent visits within past 7 days and meeting all other requirements  Today's Visits  Date Type Provider Dept   05/19/23 Telemedicine Dede Arboleda LCSW [facility]   Showing today's visits and meeting all other requirements  Future Appointments  No visits were found meeting these conditions  Showing future appointments within next 150 days and meeting all other requirements       The patient was identified by name and date of birth  Marcos Bailey was informed that this is a telemedicine visit and that the visit is being conducted throughthe [employer] platform  She agrees to proceed     My office door was closed  No one else was in the room  She acknowledged consent and understanding of privacy and security of the video platform  The patient has agreed to participate and understands they can discontinue the visit at any time  Patient is aware this is a billable service  Kandis Day is a 43 y o  female        HPI     Past Medical History:   Diagnosis Date   • Asthma    • Depression    • GERD (gastroesophageal reflux disease)    • Hypertension    • IC (interstitial cystitis)    • Migraines    • PCOS (polycystic ovarian syndrome)    • Pulmonary embolism ([facility] [address] ) 2009   • Venous insufficiency "      Past Surgical History:   Procedure Laterality Date   • ADENOIDECTOMY     • CHOLECYSTECTOMY     • LAPAROSCOPY     • TONSILLECTOMY         Current Outpatient Medications   Medication Sig Dispense Refill   • albuterol (Ventolin HFA) 90 mcg/act inhaler Inhale 2 puffs every 4 (four) hours as needed for wheezing or shortness of breath (Patient not taking: Reported on 2/21/2023) 1 Inhaler 1   • aspirin (ECOTRIN LOW STRENGTH) 81 mg EC tablet Take 2 tablets (162 mg total) by mouth daily 180 tablet 1   • enoxaparin (LOVENOX) 40 mg/0 4 mL Inject 0 4 mL (40 mg total) under the skin in the morning for 30 doses 12 mL 3   • MAGNESIUM CITRATE PO Take by mouth     • metoclopramide (Reglan) 10 mg tablet Take 1 tablet (10 mg total) by mouth every 6 (six) hours as needed (headache, nausea) 30 tablet 0   • Prenatal Vit-Fe Fumarate-FA (PRENATAL VITAMINS PO) Take by mouth     • Pyridoxine HCl (VITAMIN B6 PO) Take by mouth       No current facility-administered medications for this visit  Allergies   Allergen Reactions   • Erythromycin Swelling   • Ethylenediamine Dermatitis, Hives, Itching and Swelling   • Propolis Dermatitis, Hives, Itching, Swelling and Rash   • Sumatriptan Chest Pain and Palpitations     to all TRIPTANS   • Triptans Palpitations     flushing       Review of Systems    Video Exam    There were no vitals filed for this visit  Physical Exam     Behavioral Health Psychotherapy Progress Note    Psychotherapy Provided: Individual Psychotherapy      Diagnosis ICD-10-CM Associated Orders   1  Adjustment disorder with anxiety  F43 22            Goals addressed in session: Goal 1     DATA: Met with [facility] for a scheduled individual session  Topics discussed included emotional wellness, coping skills, work-related stress, physical health concerns and life goals and values  Cal Lees is feeling \"stressed\"  She said work has been really crazy this week   Some of her higher ups aren't being mindful of her " "being pregnant and adjusting their expectations  She's had 3 residents make comments on her weight, despite her being pregnant  Susanna Zamora worked really hard to lose weight prior to getting pregnant so this is a sensitive topic for her  We talked through this a bit; the focus being on excitement to meet her daughter and how she'll be able to lose weight and care for her physical health  Susanna Zamora hasn't been getting much sleep; she's in a lot of pain with her back and leg cramps  She's working with PT on different things to do and try  Susanna Zamora shows evidence of utilizing effective communication skills, engaging in problem solving and maintaining emotional composure to manage mental health symptoms  During this session, this clinical used the following therapeutic modalities supportive psychotherapy, client-centered therapy, stress management skills and problem solving skills  Substance Abuse was not addressed during this session  If the client is diagnosed with a co-occurring substance use disorder, please indicate any changes in the frequency or amount of use: N/A  Stage of change for addressing substance use diagnoses: No substance use/Not applicable    ASSESSMENT:  Susanna Zamora presents with a Euthymic/ normal mood  her affect is normal range and intensity, appropriate, which is congruent, with his mood and the content of the session  Susanna Zamora was oriented x3  She was focused and engaged  Susanna Zamora exhibits good therapeutic rapport with this clinician  The client has made progress on their goals  Presley Ambrosio presents with a none risk of suicide, none risk of self-harm, and none risk of harm to others  For any risk assessment that surpasses a \"low\" rating, a safety plan must be developed  A safety plan was indicated: no  If yes, describe in detail: N/A    PLAN: Susanna Zamora will return in 1 week for the next scheduled session    At the next session, the therapist will use engagement strategies, supportive " psychotherapy and client-centered therapy to address her anxiety  Behavioral Health Treatment Plan and Discharge Planning: Ricardo Crandall is aware of and agrees to continue to work on their treatment plan  They have identified and are working toward their discharge goals   yes    Visit start and stop times:    05/19/23  Start Time: 1110  Stop Time: 1158  Total Visit Time: 48 minutes

## 2023-05-22 ENCOUNTER — ROUTINE PRENATAL (OUTPATIENT)
Dept: PERINATAL CARE | Facility: OTHER | Age: 38
End: 2023-05-22

## 2023-05-22 ENCOUNTER — TELEPHONE (OUTPATIENT)
Dept: PERINATAL CARE | Facility: OTHER | Age: 38
End: 2023-05-22

## 2023-05-22 VITALS
DIASTOLIC BLOOD PRESSURE: 70 MMHG | WEIGHT: 245 LBS | BODY MASS INDEX: 33.18 KG/M2 | HEIGHT: 72 IN | HEART RATE: 90 BPM | SYSTOLIC BLOOD PRESSURE: 136 MMHG

## 2023-05-22 DIAGNOSIS — O10.919 CHRONIC HYPERTENSION AFFECTING PREGNANCY: ICD-10-CM

## 2023-05-22 DIAGNOSIS — Z86.711 HISTORY OF PULMONARY EMBOLISM: ICD-10-CM

## 2023-05-22 DIAGNOSIS — Z36.86 ENCOUNTER FOR ANTENATAL SCREENING FOR CERVICAL LENGTH: ICD-10-CM

## 2023-05-22 DIAGNOSIS — O09.512 PRIMIGRAVIDA OF ADVANCED MATERNAL AGE IN SECOND TRIMESTER: ICD-10-CM

## 2023-05-22 DIAGNOSIS — Z3A.20 20 WEEKS GESTATION OF PREGNANCY: ICD-10-CM

## 2023-05-22 DIAGNOSIS — Z36.3 ENCOUNTER FOR ANTENATAL SCREENING FOR MALFORMATIONS: ICD-10-CM

## 2023-05-22 DIAGNOSIS — O99.212 OBESITY AFFECTING PREGNANCY IN SECOND TRIMESTER: Primary | ICD-10-CM

## 2023-05-22 RX ORDER — BUTALBITAL, ACETAMINOPHEN AND CAFFEINE 300; 40; 50 MG/1; MG/1; MG/1
1 CAPSULE ORAL DAILY PRN
Qty: 10 CAPSULE | Refills: 0 | Status: SHIPPED | OUTPATIENT
Start: 2023-05-22 | End: 2023-05-30

## 2023-05-22 RX ORDER — THIAMINE HCL 100 MG
400 TABLET ORAL DAILY
COMMUNITY

## 2023-05-22 NOTE — PROGRESS NOTES
Ultrasound Probe Disinfection    A transvaginal ultrasound was performed  Prior to use, disinfection was performed with High Level Disinfection Process (Trophon)  Probe serial number [id number]: [id number]  was used        Dasha Jacobs  05/22/23  10:05 AM

## 2023-05-22 NOTE — PROGRESS NOTES
"[address]: Ms Anay Borrero was seen today for anatomic survey and cervical length screening ultrasound  See ultrasound report under \"OB Procedures\" tab  The time spent on this established patient on the encounter date included 5 minutes previsit service time reviewing records and precharting, 7 minutes face-to-face service time counseling regarding results and coordinating care, and  5 minutes charting, totalling 17 minutes  Please don't hesitate to contact our office with any concerns or questions    -Chiki Yanez MD      "

## 2023-05-25 ENCOUNTER — OFFICE VISIT (OUTPATIENT)
Dept: PHYSICAL THERAPY | Facility: CLINIC | Age: 38
End: 2023-05-25

## 2023-05-25 DIAGNOSIS — M54.50 LOW BACK PAIN DURING PREGNANCY IN SECOND TRIMESTER: Primary | ICD-10-CM

## 2023-05-25 DIAGNOSIS — O26.892 LOW BACK PAIN DURING PREGNANCY IN SECOND TRIMESTER: Primary | ICD-10-CM

## 2023-05-25 NOTE — PROGRESS NOTES
"Daily Note     Today's date: 2023  Patient name: Dc Lees  : 1985  MRN: [id number]  Referring provider: Mike Hollis PA-C  Dx:   Encounter Diagnosis     ICD-10-CM    1  Low back pain during pregnancy in second trimester  O26 892     M54 50                      Subjective: Patient notes she did go for a massage yesterday which was very helpful  She notes that she does have mild soreness in the L/S this date but is feeling better this date overall  Objective: See treatment diary below      Assessment: Tolerated treatment well  Patient would benefit from continued PT  Patient continues with tenderness with palpation to the right SI joint and lumbosacral junction  Patient is also very tender with palpation to the piriformis  She was educated with self massage techniques using a tennis ball at the piriformis  Plan: Continue per plan of care        Precautions: PREGNANCY      Daily Treatment Diary:      Initial Evaluation Date: 23  Compliance            Visit Number 1 2  3  4  5             [facility]  IE -  -  -  -             Foto Captured Y -  -  -  -                               Manual                      Stretch IR/ Piriformis 30\" x5  30\" x5  30\" x5  30\"x5 IR/Piriformis/ SKTC/ HS  30\"x5IR/ Piriformis/ SKTC/ HS 15 min  30\" x3 IR/ Piriformis/ SKTC/ HS           STM -  left sidely 10 min  10 min  15 min  10 min  10 min           Kinesio tape -        5 min 2 I strips on erector spinea  5 min space correction           Ther-Ex                      LTR   5\" x10  5\" x10  5\"x10  5\" x10  5\" x10           TA set with PPT   2 min  2 min  5\" x15  5\" x15  5\" x15           TA with march   2 min  -                 Self IR stretch         10\" x5  10\" x5           Supine clamshell   Blue 2 min  ----->  --->               Supine add   2 min  ---->  --->               Standing hip flexor stretch   20\" x3 ea  --->  ---->            " "   BKFO     x10  x10    -           Cat cow     5\" x5  5\"x10    -           Lucrecia pose     5\" x5  5\" x10    -           Neuro Re-Ed                                                                                                Ther-Act                                                               Modalities                      HP/ CP  HP in left sidely x10 min HP in left slidely x10 min                                                  Access Code: [email or web address]  URL: [email or web address]  Date: 05/01/2023  Prepared by: Zeina Valle    Exercises  - Caregiver PROM Hip Internal Rotation  - 1 x daily - 7 x weekly - 1 sets - 5 reps - 20-30 hold  - Self IR stretch  - 1 x daily - 7 x weekly - 1 sets - 10 reps - 10 hold  - Supine Piriformis Stretch with Foot on Ground  - 1 x daily - 7 x weekly - 1 sets - 5 reps - 20-30 hold                   "

## 2023-05-30 ENCOUNTER — TELEMEDICINE (OUTPATIENT)
Dept: NEUROLOGY | Facility: CLINIC | Age: 38
End: 2023-05-30

## 2023-05-30 ENCOUNTER — OFFICE VISIT (OUTPATIENT)
Dept: PHYSICAL THERAPY | Facility: CLINIC | Age: 38
End: 2023-05-30

## 2023-05-30 DIAGNOSIS — G43.709 CHRONIC MIGRAINE WITHOUT AURA WITHOUT STATUS MIGRAINOSUS, NOT INTRACTABLE: Primary | ICD-10-CM

## 2023-05-30 DIAGNOSIS — Z3A.21 21 WEEKS GESTATION OF PREGNANCY: ICD-10-CM

## 2023-05-30 DIAGNOSIS — O26.892 LOW BACK PAIN DURING PREGNANCY IN SECOND TRIMESTER: Primary | ICD-10-CM

## 2023-05-30 DIAGNOSIS — M54.50 LOW BACK PAIN DURING PREGNANCY IN SECOND TRIMESTER: Primary | ICD-10-CM

## 2023-05-30 DIAGNOSIS — Z3A.13 13 WEEKS GESTATION OF PREGNANCY: ICD-10-CM

## 2023-05-30 RX ORDER — LORATADINE 10 MG/1
10 TABLET ORAL DAILY
COMMUNITY

## 2023-05-30 RX ORDER — MONTELUKAST SODIUM 10 MG/1
10 TABLET ORAL DAILY
COMMUNITY
Start: 2023-05-26

## 2023-05-30 NOTE — PROGRESS NOTES
Review of Systems   Constitutional: Negative for appetite change and fever  HENT: Negative  Negative for hearing loss, tinnitus, trouble swallowing and voice change  Eyes: Negative  Negative for photophobia and pain  Respiratory: Negative  Negative for shortness of breath  Cardiovascular: Negative  Negative for palpitations  Gastrointestinal: Negative  Negative for nausea and vomiting  Endocrine: Negative  Negative for cold intolerance  Genitourinary: Negative  Negative for dysuria, frequency and urgency  Musculoskeletal: Negative  Negative for myalgias and neck pain  Skin: Negative  Negative for rash  Neurological: Positive for dizziness and headaches  Negative for tremors, seizures, syncope, facial asymmetry, speech difficulty, weakness, light-headedness and numbness  Hematological: Negative  Does not bruise/bleed easily  Psychiatric/Behavioral: Positive for sleep disturbance  Negative for confusion and hallucinations  All other systems reviewed and are negative

## 2023-05-30 NOTE — PATIENT INSTRUCTIONS
If you have any new or concerning vision please go to the emergency department of course but also follow-up with eye doctor for dilated eye exam       Additional Testing:   Neurodiagnostic workup: MRI Brain ordered    Headache/migraine treatment:   Abortive medications (for immediate treatment of a headache): It is ok to take ibuprofen, acetaminophen or naproxen (Advil, Tylenol,  Aleve, Excedrin) if they help your headaches you should limit these to No more than 3 times a week to avoid medication overuse/rebound headaches       For your more moderate to severe migraines take this medication early   Try exedrin tension     Rescue medications thought to be likely ok during pregnancy:   First line: tylenol/acetaminophen, diphenhydramine/benadryl, Metoclopramide/Reglan, [NSAIDs (ibuprofen, naproxen and diclofenac) could be considered between 12-20 weeks only , but check with OB]  Second line:  rizatriptan/sumatriptan and other triptans, Ondansetron/zofran, prednisone short acting, prochlorperazine/compazine, promethazine/Phenergan, [also fioricet (butalbital-acetaminophen-caffeine) although I do not recommend this typically]  Avoid when possible: aspirin (except in those instructed to take by OB for other reasons), indomethacin, opioids   Always avoid: DHE/ergots, currently the new migraine meds have too many unknowns so we are avoiding for now until more information - lasmiditan/reyvow, Nurtec/rimegepant, Ubrelvy/Ubrogepant     These medications are thought to be compatible with breast-feeding:  Ibuprofen, acetaminophen, triptans  Probably compatible with breast-feeding:  Diphenhydramine/Benadryl, metoclopramide/Reglan, ondansetron /Zofran, if needed steroids, nurtec  prochlorperazine/Compazine  Avoid: Aspirin, DHE/ergot's, lasmiditan/Reyvow      Over the counter preventive supplements for headaches/migraines (if you try, try for 3 months straight)  (to take every day to help prevent headaches - not to take at the time of headache): There are combo pills online of these - none of which regulated by FDA and double check dosing - take appropriate dose only once a day- preventa migraine, migravent, mind ease, migrelief   [x] Magnesium 400mg daily (If any diarrhea or upset stomach, decrease dose  as tolerated)  [x] Riboflavin (Vitamin B2) 400mg daily - try online  (FYI B2 may make your urine bright/neon yellow)      Prescription preventive medications for headaches/migraines   (to take every day to help prevent headaches - not to take at the time of headache):  [x] we would consider diamox if you ever wanted to       *Typically these types of medications take time untill you see the benefit, although some may see improvement in days, often it may take weeks, especially if the medication is being titrated up to a beneficial level  Please contact us if there are any concerns or questions regarding the medication  Lifestyle Recommendations:  [x] SLEEP - Maintain a regular sleep schedule: Adults need at least 7-8 hours of uninterrupted a night  Maintain good sleep hygiene:  Going to bed and waking up at consistent times, avoiding excessive daytime naps, avoiding caffeinated beverages in the evening, avoid excessive stimulation in the evening and generally using bed primarily for sleeping  One hour before bedtime would recommend turning lights down lower, decreasing your activity (may read quietly, listen to music at a low volume)  When you get into bed, should eliminate all technology (no texting, emailing, playing with your phone, iPad or tablet in bed)  [x] HYDRATION - Maintain good hydration  Drink  2L of fluid a day (4 typical small water bottles)  [x] DIET - Maintain good nutrition  In particular don't skip meals and try and eat healthy balanced meals regularly  [x] TRIGGERS - Look for other triggers and avoid them: Limit caffeine to 1-2 cups a day or less   Avoid dietary triggers that you have noticed bring on your headaches (this could include aged cheese, peanuts, MSG, aspartame and nitrates)  [x] EXERCISE - physical exercise as we all know is good for you in many ways, and not only is good for your heart, but also is beneficial for your mental health, cognitive health and  chronic pain/headaches  I would encourage at the least 5 days of physical exercise weekly for at least 30 minutes  Education and Follow-up  [x] Please call with any questions or concerns  Of course if any new concerning symptoms go to the emergency department  [x] Follow up at least 1 week after MRI, sooner if needed    - As we discussed if there are no abnormalities on the brain MRI that need urgent intervention (very often there are incidental findings that may not mean anything significant and are better discussed in person), you will not necessarily receive a call from us, but feel free to call in to check on the status of the report (since not knowing can be anxiety provoking) and the nurses will let you know the result or make sure I have nothing to pass on regarding the results first   Ideally, all of this will be discussed in detail in the follow-up visit

## 2023-05-30 NOTE — PROGRESS NOTES
Virtual Regular Visit    Verification of patient location:    Patient is located at Other in the following state in which I hold an active license [place]      Assessment/Plan:    Problem List Items Addressed This Visit        Cardiovascular and Mediastinum    Migraine - Primary    Relevant Orders    MRI brain without contrast   Other Visit Diagnoses     21 weeks gestation of pregnancy        Relevant Orders    MRI brain without contrast               Reason for visit is No chief complaint on file  Encounter provider Pinky Colin MD    Provider located at [address] [place] [id number]  [phone]      Recent Visits  No visits were found meeting these conditions  Showing recent visits within past 7 days and meeting all other requirements  Today's Visits  Date Type Provider Dept   05/30/23 Telemedicine Pinky Colin MD [facility] today's visits and meeting all other requirements  Future Appointments  No visits were found meeting these conditions  Showing future appointments within next 150 days and meeting all other requirements       The patient was identified by name and date of birth  Isabel Olmos was informed that this is a telemedicine visit and that the visit is being conducted through the [facility] [employer]  She agrees to proceed     My office door was closed  The patient was notified the following individuals were present in the room MS3 Abiel Borrego  She acknowledged consent and understanding of privacy and security of the video platform  The patient has agreed to participate and understands they can discontinue the visit at any time  Patient is aware this is a billable service       Subjective  Assessment/Plan:     Isabel Olmos is a very pleasant  43 y o  female with a past medical history that includes asthma, hypertension, depression, GERD, BMI over 30, chronic back pain, interstitial cystitis, idiopathic hypersomnia on Adderall, history of PE 3/2009 b/l unknown (2009, coumadin 9 months, lovenox in pregnancy), migraines with and without aura, PCOS, hand paresthesias, ulnar entrapment at the elbow, poor sleep, endometriosis, status postcholecystectomy, idiopathic hypersomnia, hyperlipidemia, venous insufficiency referred here for evaluation of headache  My initial evaluation 5/30/2023    Chronic migraine without aura without status migrainosus, not intractable  21 weeks gestation of pregnancy  She reports a long history of headaches dating back to childhood that worsened in high school that she did not realize were migraines until now  She reports she has been on blood pressure pills since age 24  She had issues with infertility and was planning on doing IUI when she got pregnant on topiramate 100 mg twice daily through other providers and stopped it quickly  She comes to see me today due to currently being 21 weeks pregnant and increasing headaches off of topiramate  She reports pain is typically occipital bilaterally pressure stabbing and dull, bitemporal, retro-orbital and can be bilateral but more often on the right  She denies typical aura and reports typical associated migrainous features with some atypical features including blurred vision worse on the right with some autonomic features   -As of 5/30/2023: She reports headaches have increased in frequency during pregnancy since around 8 to 10 weeks  She stopped topiramate 100 mg BID when she found out she was pregnant  We discussed how she may have had a subclinical IIH picture that was worsened coming off of topiramate and with pregnancy  We discussed MRI brain to rule out nefarious causes,  If signs of intracranial hypertension found or if any significant vision changes we discussed adding trial of acetazolamide/Diamox since topiramate would not be safe during pregnancy    Discussed which medications are thought "to be safest and agreed that magnesium and riboflavin are helpful for prevention  Discussed I never recommend Fioricet and to replace it with \"Excedrin tension\" which is the same without the barbiturate, but that anything as needed would not be the full answer since headaches are daily  Could even consider a triptan, but she would rather hold off since she did not like side effects from sumatriptan  She reports very poor sleep which is likely the biggest trigger and follows with sleep medicine for idiopathic hypersomnia  We discussed positionally sleeping on the side may be better or with multiple pillows propped up, as despite sleep studies being negative I still suspect a component of sleep apnea leading to subclinical IIH picture  Workup:  - due to increased frequency and severity of headaches and migraines in a pregnant patient with a history of PE, concern for intracranial hypertension, I recommend further evaluation with MRI brain (, no contrast due to pregnancy) without contrast to rule out structural or treatable causes of symptoms   -We discussed MRV is something else that could be considered however would start with MRI brain to see first if there is any findings to suggest need for MRV and reassuring that she is on Lovenox during pregnancy due to history of PE as far as concern for cerebral venous thrombosis goes     Preventative:  - we discussed headache hygiene and lifestyle factors that may improve headaches  -She is not currently interested in prescription preventative, options are indeed limited during pregnancy  - Currently on through other providers:  We discussed continuing headache preventative supplements and increasing magnesium to 400- 500 mg and riboflavin 400 mg  - Past/ failed/contraindicated: Propranolol would be avoided long-term due to history of asthma although can be used in pregnancy, topiramate 100 mg twice daily stopped January 2023 due to pregnancy, amitriptyline, " lisinopril, buspirone/BuSpar, fluoxetine/Prozac, Adderall 40 mg in a m  prepregnancy, lisinopril  - future options during pregnancy: Acetazolamide/Diamox, cyproheptadine, cyclobenzaprine, amitriptyline, propranolol, verapamil, botox    Abortive:  - discussed not taking over-the-counter or prescription pain medications more than 3 days per week to prevent medication overuse/rebound headache  - We discussed I would recommend if needed for significant migraine considering rizatriptan 5 to 10 mg  Discussed proper use, possible side effects and risks  - Currently on through other providers: Metoclopramide no to little relief  - Past/ failed/contraindicated:  We discussed I do not recommend Fioricet ever, sumatriptan side effects of increased heart rate sweatiness   -Past/tolerated: Steroids  - future options post pregnancy:   prochlorperazine, Toradol IM or p o , could consider trial for 5 days of Depakote or dexamethasone for prolonged migraine, ubrelvy, reyvow, nurtec  -Future options as needed during pregnancy:  Rescue medications thought to be likely ok during pregnancy:   First line: tylenol/acetaminophen, diphenhydramine/benadryl, Metoclopramide/Reglan, [NSAIDs (ibuprofen, naproxen and diclofenac) could be considered between 12-20 weeks only , but check with OB]  Second line:  rizatriptan/sumatriptan and other triptans, Ondansetron/zofran, prednisone short acting, prochlorperazine/compazine, promethazine/Phenergan, [also fioricet (butalbital-acetaminophen-caffeine) although I do not recommend this typically]  Avoid when possible: aspirin (except in those instructed to take by OB for other reasons), indomethacin, opioids   Always avoid: DHE/ergots, currently the new migraine meds have too many unknowns so we are avoiding for now until more information - lasmiditan/reyvow, Nurtec/rimegepant, Ubrelvy/Ubrogepant     These medications are thought to be compatible with breast-feeding:  Ibuprofen, acetaminophen, triptans  Probably compatible with breast-feeding:  Diphenhydramine/Benadryl, metoclopramide/Reglan, ondansetron /Zofran, if needed steroids, nurtec  prochlorperazine/Compazine  Avoid: Aspirin, DHE/ergot's, lasmiditan/Reyvow        Patient instructions        If you have any new or concerning vision please go to the emergency department of course but also follow-up with eye doctor for dilated eye exam       Additional Testing:   Neurodiagnostic workup: MRI Brain ordered    Headache/migraine treatment:   Abortive medications (for immediate treatment of a headache): It is ok to take ibuprofen, acetaminophen or naproxen (Advil, Tylenol,  Aleve, Excedrin) if they help your headaches you should limit these to No more than 3 times a week to avoid medication overuse/rebound headaches       For your more moderate to severe migraines take this medication early   Try exedrin tension     Rescue medications thought to be likely ok during pregnancy:   First line: tylenol/acetaminophen, diphenhydramine/benadryl, Metoclopramide/Reglan, [NSAIDs (ibuprofen, naproxen and diclofenac) could be considered between 12-20 weeks only , but check with OB]  Second line:  rizatriptan/sumatriptan and other triptans, Ondansetron/zofran, prednisone short acting, prochlorperazine/compazine, promethazine/Phenergan, [also fioricet (butalbital-acetaminophen-caffeine) although I do not recommend this typically]  Avoid when possible: aspirin (except in those instructed to take by OB for other reasons), indomethacin, opioids   Always avoid: DHE/ergots, currently the new migraine meds have too many unknowns so we are avoiding for now until more information - lasmiditan/reyvow, Nurtec/rimegepant, Ubrelvy/Ubrogepant     These medications are thought to be compatible with breast-feeding:  Ibuprofen, acetaminophen, triptans  Probably compatible with breast-feeding:  Diphenhydramine/Benadryl, metoclopramide/Reglan, ondansetron /Zofran, if needed steroids, nurtec  prochlorperazine/Compazine  Avoid: Aspirin, DHE/ergot's, lasmiditan/Reyvow      Over the counter preventive supplements for headaches/migraines (if you try, try for 3 months straight)  (to take every day to help prevent headaches - not to take at the time of headache): There are combo pills online of these - none of which regulated by FDA and double check dosing - take appropriate dose only once a day- preventa migraine, migravent, mind ease, migrelief   [x] Magnesium 400mg daily (If any diarrhea or upset stomach, decrease dose  as tolerated)  [x] Riboflavin (Vitamin B2) 400mg daily - try online  (FYI B2 may make your urine bright/neon yellow)      Prescription preventive medications for headaches/migraines   (to take every day to help prevent headaches - not to take at the time of headache):  [x] we would consider diamox if you ever wanted to       *Typically these types of medications take time untill you see the benefit, although some may see improvement in days, often it may take weeks, especially if the medication is being titrated up to a beneficial level  Please contact us if there are any concerns or questions regarding the medication  Lifestyle Recommendations:  [x] SLEEP - Maintain a regular sleep schedule: Adults need at least 7-8 hours of uninterrupted a night  Maintain good sleep hygiene:  Going to bed and waking up at consistent times, avoiding excessive daytime naps, avoiding caffeinated beverages in the evening, avoid excessive stimulation in the evening and generally using bed primarily for sleeping  One hour before bedtime would recommend turning lights down lower, decreasing your activity (may read quietly, listen to music at a low volume)  When you get into bed, should eliminate all technology (no texting, emailing, playing with your phone, iPad or tablet in bed)  [x] HYDRATION - Maintain good hydration    Drink  2L of fluid a day (4 typical small water bottles)  [x] DIET - Maintain good nutrition  In particular don't skip meals and try and eat healthy balanced meals regularly  [x] TRIGGERS - Look for other triggers and avoid them: Limit caffeine to 1-2 cups a day or less  Avoid dietary triggers that you have noticed bring on your headaches (this could include aged cheese, peanuts, MSG, aspartame and nitrates)  [x] EXERCISE - physical exercise as we all know is good for you in many ways, and not only is good for your heart, but also is beneficial for your mental health, cognitive health and  chronic pain/headaches  I would encourage at the least 5 days of physical exercise weekly for at least 30 minutes  Education and Follow-up  [x] Please call with any questions or concerns  Of course if any new concerning symptoms go to the emergency department  [x] Follow up at least 1 week after MRI, sooner if needed    - As we discussed if there are no abnormalities on the brain MRI that need urgent intervention (very often there are incidental findings that may not mean anything significant and are better discussed in person), you will not necessarily receive a call from us, but feel free to call in to check on the status of the report (since not knowing can be anxiety provoking) and the nurses will let you know the result or make sure I have nothing to pass on regarding the results first   Ideally, all of this will be discussed in detail in the follow-up visit  CC:   We had the pleasure of evaluating Jerri Remy in neurological consultation today  Jerri Remy is a   right handed female who presents today for evaluation of headaches  History obtained from patient as well as available medical record review    History of Present Illness:   Current medical illnesses  or past medical history include asthma, hypertension, depression, GERD, BMI over 30, chronic back pain, interstitial cystitis, history of PE (2009, coumadin 9 months, lovenox in pregnancy), migraines with and without aura, PCOS, hand paresthesias, ulnar entrapment at the elbow, poor sleep, idiopathic hypersomnia, hyperlipidemia, venous insufficiency    She is currently pregnant and was referred by OB due to history of migraines prior to pregnancy and during pregnancy  She called in this morning stating that she lives far away requesting virtual visit and we discussed it will be harder to do with physical exam virtually which may impact care  21 weeks PREGNANT, due Oct 8th and plan for end of Sept      Headaches started at what age? Mild as a kid, High school worse   How often do the headaches occur? - BP pills since age 24 and climbing  - as of 5/30/2023: daily headaches since 8-10 weeks  What time of the day do the headaches start? Worse in am   How long do the headaches last? Worse through the day  Are you ever headache free? Yes    Aura? without aura     Last eye exam: wears glasses, 7 months ago, no new vision changes     Where is your headache located and pain quality?   - back of head towards base of spinal cord, bioccipital region - pressure, stabbing, dull,   Bitemporal  Retro-orbital  Can be bilateral but more often on the right    What is the intensity of pain? Average: 4-5/10, worst 10+/10  Associated symptoms:   [] Nausea   - more with preg    [] Vomiting         [x] Photophobia     [x]Phonophobia      [] Osmophobia  [x] Blurred vision - once in a while, right eye  [x] Light-headed or dizzy   - from time to time if getting up   [] Tinnitus   [] Hands or feet tingle or feel numb/paresthesias    [] Ptosis      [] Facial droop  [] Lacrimation  [] Nasal congestion/rhinorrhea  - sometimes       Things that make the headache worse? No specific movements    Headache triggers: In the past chocolate caffeine, red wine, Hormonal changes    Have you seen someone else for headaches or pain? Yes, neurology in [facility] around [id number]  Have you had trigger point injection performed and how often?  No  Have you had Botox injection performed and how often? No   Have you had epidural injections or transforaminal injections performed? No  Are you current pregnant or planning on getting pregnant? TBD - this was supposed to IUI but then got pregnant on topiramate but then stopped, was thinking another, not TBD  Have you ever had any Brain imaging? yes     What medications do you take or have you taken for your headaches? ABORTIVE:      Metoclopramide 10 mg - no relief     past  Fioricet -butalbital APAP caffeine - didn't start and I took off list   Steroids  imitrex - HR inc, sweaty    PREVENTIVE:     Magnesium 200 mg   Riboflavin 200 mg     Past/ failed/contraindicated:  Propranolol be contraindicated due to her history of asthma  topiramate 100 mg BID - Jan 2023   Amitriptyline   Lisinopril  Buspar/buspirone  Fluoxetine/prosac    Pre pregnancy adderall 40 mg in am, topiramate 100 mg BID, buspar, metformin, lisinopril      LIFESTYLE  Sleep -  History of idiopathic hypersomnia  Previous polysomnogram completed at [facility] on 10/2021 showed AHI of 0 9 events per hour with oxygen eli of 85%  - lost 120 pounds    PSG 11/16/22:  AHI 0 0 SpO2 eli 95%  MSLT 11/17/22: Sleep 5/5 naps; mean sleep latency 5 3 minutes  ESS 14 at [facility]   - averages: very poor, back pain, maybe 3 hours a night, pregnancy pillow on side or back in cocoon, slept much easier       Water: 1-2 gallon per day  Caffeine: 8 oz maybe soda    Mood:   Depression in the past, now overly tired but ok     The following portions of the patient's history were reviewed and updated as appropriate: allergies, current medications, past family history, past medical history, past social history, past surgical history and problem list     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   Lives with significant other/boyfriend         Past Medical History:   Diagnosis Date   • Asthma    • Depression    • GERD (gastroesophageal reflux disease)    • Hypertension    • IC (interstitial cystitis)    • Migraines    • PCOS (polycystic ovarian syndrome)    • Pulmonary embolism ([facility] [address] ) 2009   • Venous insufficiency        Past Surgical History:   Procedure Laterality Date   • ADENOIDECTOMY     • CHOLECYSTECTOMY     • LAPAROSCOPY     • TONSILLECTOMY         Current Outpatient Medications   Medication Sig Dispense Refill   • albuterol (Ventolin HFA) 90 mcg/act inhaler Inhale 2 puffs every 4 (four) hours as needed for wheezing or shortness of breath 1 Inhaler 1   • aspirin (ECOTRIN LOW STRENGTH) 81 mg EC tablet Take 2 tablets (162 mg total) by mouth daily 180 tablet 1   • Doxylamine Succinate, Sleep, (UNISOM PO) Take 1 tablet by mouth daily     • enoxaparin (LOVENOX) 40 mg/0 4 mL Inject 0 4 mL (40 mg total) under the skin in the morning for 30 doses 12 mL 3   • loratadine (CLARITIN) 10 mg tablet Take 10 mg by mouth daily     • MAGNESIUM CITRATE PO Take by mouth     • metoclopramide (Reglan) 10 mg tablet Take 1 tablet (10 mg total) by mouth every 6 (six) hours as needed (headache, nausea) 30 tablet 0   • montelukast (SINGULAIR) 10 mg tablet Take 10 mg by mouth daily     • Prenatal Vit-Fe Fumarate-FA (PRENATAL VITAMINS PO) Take by mouth     • Pyridoxine HCl (VITAMIN B6 PO) Take by mouth     • Riboflavin (B2) 100 MG TABS Take 200 mg by mouth in the morning       No current facility-administered medications for this visit          Allergies   Allergen Reactions   • Erythromycin Swelling   • Ethylenediamine Dermatitis, Hives, Itching and Swelling   • Propolis Dermatitis, Hives, Itching, Swelling and Rash   • Sumatriptan Chest Pain and Palpitations     to all TRIPTANS   • Triptans Palpitations     flushing         Objective:     Exam limited by Video    Physical Exam: Vitals:            Constitutional:    LMP 01/06/2023   BP Readings from Last 3 Encounters:   05/22/23 136/70   05/05/23 128/82   04/14/23 116/70     Pulse Readings from Last 3 Encounters:   05/22/23 90   05/05/23 89   04/14/23 80         Well developed, well nourished, well groomed  No dysmorphic features, cooperative       HEENT:  Normocephalic atraumatic  No meningismus  Oropharynx is clear and moist  No oral mucosal lesions noted  Chest:  Respirations appear regular and unlabored  Musculoskeletal:  Appears to have Full range of motion  (see below under neurologic exam for evaluation of motor function and gait)   Skin:  Appears warm and dry, not diaphoretic  No apparent birthmarks or stigmata of neurocutaneous disease  Psychiatric:  Normal behavior and appropriate affect        Neurological Examination:     Mental status/cognitive function:   Recent and remote memory intact  Attention span and concentration as well as fund of knowledge are appropriate for age  Normal language and spontaneous speech  Cranial Nerves:  II-visual fields appear full  Unable to do fundus exam over video  III, IV, VI-Pupils appear equal, round  Extraocular movements were full and conjugate without nystagmus  V-facial sensation symmetric  VII-facial expression symmetric, intact forehead wrinkle, strong eye closure, symmetric smile    VIII-hearing grossly intact bilaterally   IX, X-palate elevation symmetric, no dysarthria  XI-shoulder shrug strength intact    XII-tongue protrusion midline  Motor Exam: symmetric bulk and tone throughout, no pronator drift  no atrophy, fasciculations or abnormal movements noted  Sensory: they report grossly intact light touch in all extremities  Reflexes: unable to assess on video  Coordination:no apparent dysmetria, ataxia or tremor noted  Gait: steady casual gait       Pertinent lab results:   -3/21/2023 CMP remarkable for glucose 129   CBC unremarkable  TSH normal Imaging:   - no head imaging in our system          Review of Systems:   ROS obtained by medical assistant Personally reviewed and updated if indicated  I recommended PCP follow up for non neurologic problems  Review of Systems   Constitutional: Negative for appetite change and fever  HENT: Negative  Negative for hearing loss, tinnitus, trouble swallowing and voice change  Eyes: Negative  Negative for photophobia and pain  Respiratory: Negative  Negative for shortness of breath  Cardiovascular: Negative  Negative for palpitations  Gastrointestinal: Negative  Negative for nausea and vomiting  Endocrine: Negative  Negative for cold intolerance  Genitourinary: Negative  Negative for dysuria, frequency and urgency  Musculoskeletal: Negative  Negative for myalgias and neck pain  Skin: Negative  Negative for rash  Neurological: Positive for dizziness and headaches  Negative for tremors, seizures, syncope, facial asymmetry, speech difficulty, weakness, light-headedness and numbness  Hematological: Negative  Does not bruise/bleed easily  Psychiatric/Behavioral: Positive for sleep disturbance  Negative for confusion and hallucinations  All other systems reviewed and are negative  I have spent 40 minutes with Patient today in which greater than 50% of this time was spent in counseling/coordination of care regarding Diagnostic results, Prognosis, Risks and benefits of tx options, Instructions for management, Patient  education, Importance of tx compliance, Risk factor reductions, Impressions, Counseling / Coordination of care, Documenting in the medical record, Reviewing / ordering tests, medicine, procedures   and Obtaining or reviewing history    I also spent 22 minutes non face to face for this patient the same day         Author:  Tyron Jesus MD       Visit Time  Total Visit Duration: 58

## 2023-05-30 NOTE — PROGRESS NOTES
"Daily Note     Today's date: 2023  Patient name: Larry Valenzuela  : 1985  MRN: [id number]  Referring provider: Shellie Swanson PA-C  Dx:   Encounter Diagnosis     ICD-10-CM    1  Low back pain during pregnancy in second trimester  O26 892     M54 50                      Subjective: Patient notes pain in the RLE persists daily  She notes symptoms were somewhat more mild until last night at which time they did increase resulting in her inability to return to sleep  Objective: See treatment diary below      Assessment: Tolerated treatment well  Patient would benefit from continued PT  Patient was able to work through AROM with gentle stretching in supine  She was not able to tolerate physioball roll outs this date  She continue with pain at the piriformis and along the erector spinae  Patient has been referred to neurology and will have a MRI/ CT in two weeks for further evaluation  Plan: Continue per plan of care  Precautions: PREGNANCY / anti-coag therapy (lovenox/ baby aspirin)        Daily Treatment Diary:      Initial Evaluation Date: 23  Compliance          Visit Number 1 2  3  4  5  6  7         Re-Eval  IE -  -  -  -          MC   Foto Captured Y -  -  -  -                             Manual                      Stretch IR/ Piriformis 30\" x5  30\" x5  30\" x5  30\"x5 IR/Piriformis/ SKTC/ HS  30\"x5IR/ Piriformis/ SKTC/ HS 15 min  30\" x3 IR/ Piriformis/ SKTC/ HS  30\"x3 IR/ piriformis/ SKTC HS 15 min         STM -  left sidely 10 min  10 min  15 min  10 min  10 min  10 min         Kinesio tape -        5 min 2 I strips on erector spinea  5 min space correction  5 min 2 I strips with 1 space correction strip         Ther-Ex                      LTR   5\" x10  5\" x10  5\"x10  5\" x10  5\" x10  5\" x10         TA set with PPT   2 min  2 min  5\" x15  5\" x15  5\" x15  5\" x15         TA with march   2 min  -             " "    Self IR stretch         10\" x5  10\" x5  10\" x5         Supine clamshell   Blue 2 min  ----->  --->               Supine add   2 min  ---->  --->               Standing hip flexor stretch   20\" x3 ea  --->  ---->               BKFO     x10  x10    -           Cat cow     5\" x5  5\"x10    -           Lucrecia pose     5\" x5  5\" x10    -  p-ball roll out x2         Neuro Re-Ed                                                                                                Ther-Act                                                               Modalities                      HP/ CP  HP in left sidely x10 min HP in left slidely x10 min HP x10 min HP x10 min HP x10 min HP x10 min                                              Access Code: [email or web address]  URL: [email or web address]  Date: 05/01/2023  Prepared by: Gianluca Rae    Exercises  - Caregiver PROM Hip Internal Rotation  - 1 x daily - 7 x weekly - 1 sets - 5 reps - 20-30 hold  - Self IR stretch  - 1 x daily - 7 x weekly - 1 sets - 10 reps - 10 hold  - Supine Piriformis Stretch with Foot on Ground  - 1 x daily - 7 x weekly - 1 sets - 5 reps - 20-30 hold                     "

## 2023-06-01 ENCOUNTER — EVALUATION (OUTPATIENT)
Dept: PHYSICAL THERAPY | Facility: CLINIC | Age: 38
End: 2023-06-01

## 2023-06-01 ENCOUNTER — TELEMEDICINE (OUTPATIENT)
Dept: PSYCHIATRY | Facility: CLINIC | Age: 38
End: 2023-06-01

## 2023-06-01 DIAGNOSIS — O26.892 LOW BACK PAIN DURING PREGNANCY IN SECOND TRIMESTER: Primary | ICD-10-CM

## 2023-06-01 DIAGNOSIS — M54.50 LOW BACK PAIN DURING PREGNANCY IN SECOND TRIMESTER: Primary | ICD-10-CM

## 2023-06-01 DIAGNOSIS — F43.22 ADJUSTMENT DISORDER WITH ANXIETY: Primary | ICD-10-CM

## 2023-06-01 NOTE — PSYCH
Virtual Regular Visit    Verification of patient location:    Patient is located at Home in the following state in which I hold an active license [place]      Assessment/Plan:    Problem List Items Addressed This Visit    None  Visit Diagnoses     Adjustment disorder with anxiety    -  Primary          Goals addressed in session: Goal 1          Reason for visit is No chief complaint on file  Encounter provider Tani Whelan LCSW    Provider located at [address] [address] [phone]      Recent Visits  No visits were found meeting these conditions  Showing recent visits within past 7 days and meeting all other requirements  Today's Visits  Date Type Provider Dept   06/01/23 Telemedicine Tani Whelan LCSW [facility]   Showing today's visits and meeting all other requirements  Future Appointments  No visits were found meeting these conditions  Showing future appointments within next 150 days and meeting all other requirements       The patient was identified by name and date of birth  Eva Kevin was informed that this is a telemedicine visit and that the visit is being conducted throughthe Anc[employer] platform  She agrees to proceed     My office door was closed  No one else was in the room  She acknowledged consent and understanding of privacy and security of the video platform  The patient has agreed to participate and understands they can discontinue the visit at any time  Patient is aware this is a billable service  Albert Lott is a 43 y o  female        HPI     Past Medical History:   Diagnosis Date   • Asthma    • Depression    • GERD (gastroesophageal reflux disease)    • Hypertension    • IC (interstitial cystitis)    • Migraines    • PCOS (polycystic ovarian syndrome)    • Pulmonary embolism ([facility] [address] ) 2009   • Venous insufficiency Past Surgical History:   Procedure Laterality Date   • ADENOIDECTOMY     • CHOLECYSTECTOMY     • LAPAROSCOPY     • TONSILLECTOMY         Current Outpatient Medications   Medication Sig Dispense Refill   • albuterol (Ventolin HFA) 90 mcg/act inhaler Inhale 2 puffs every 4 (four) hours as needed for wheezing or shortness of breath 1 Inhaler 1   • aspirin (ECOTRIN LOW STRENGTH) 81 mg EC tablet Take 2 tablets (162 mg total) by mouth daily 180 tablet 1   • Doxylamine Succinate, Sleep, (UNISOM PO) Take 1 tablet by mouth daily     • enoxaparin (LOVENOX) 40 mg/0 4 mL Inject 0 4 mL (40 mg total) under the skin in the morning for 30 doses 12 mL 3   • loratadine (CLARITIN) 10 mg tablet Take 10 mg by mouth daily     • MAGNESIUM CITRATE PO Take by mouth     • metoclopramide (Reglan) 10 mg tablet Take 1 tablet (10 mg total) by mouth every 6 (six) hours as needed (headache, nausea) 30 tablet 0   • montelukast (SINGULAIR) 10 mg tablet Take 10 mg by mouth daily     • Prenatal Vit-Fe Fumarate-FA (PRENATAL VITAMINS PO) Take by mouth     • Pyridoxine HCl (VITAMIN B6 PO) Take by mouth     • Riboflavin (B2) 100 MG TABS Take 200 mg by mouth in the morning       No current facility-administered medications for this visit  Allergies   Allergen Reactions   • Erythromycin Swelling   • Ethylenediamine Dermatitis, Hives, Itching and Swelling   • Propolis Dermatitis, Hives, Itching, Swelling and Rash   • Sumatriptan Chest Pain and Palpitations     to all TRIPTANS   • Triptans Palpitations     flushing       Review of Systems    Video Exam    There were no vitals filed for this visit  Physical Exam     Behavioral Health Psychotherapy Progress Note    Psychotherapy Provided: Individual Psychotherapy      Diagnosis ICD-10-CM Associated Orders   1  Adjustment disorder with anxiety  F43 22            Goals addressed in session: Goal 1     DATA: Met with Jaye Mcdonough for a scheduled individual session    Topics discussed included "emotional wellness, coping skills, work-related stress and physical health concerns  Marek Resendiz is feeling \"very stressed\"  She was tearful, stating \"this job is tearing me apart\"  She's been having random nosebleeds that come out of nowhere, her leg cramps are still bad and she's not getting much sleep, so she's feeling exhausted  The nosebleeds may be due to pregnancy as well as having high blood pressure  Her blood pressure has been creeping up  This clinician expressed concern for Estrellita's health, especially with her being pregnant  She's not been able to make a few PT sessions because of work, now her blood pressure is increasing and she's experiencing nose bleeds  We discussed that she is a human being and a mother first, she's an  second  She has the right to take care of her own health as well as the health of her unborn child  She was encouraged to do any corresponding regarding her appointments and boundaries via e-mail so she has them in writing  This clinician expressed concern that she doesn't seem to be receiving the support she needs in her job position and thus it seems appropriate to document her conversations regarding her health, appointments and boundaries she's setting to tend to her health  We talked about taking 10 minutes every few hours to do some deep breathing and progressive muscle relaxation with the understanding that it's not easy to break away from work but that it's what is best for her health at this time  Marek Resendiz shows evidence of utilizing effective communication skills, engaging in problem solving and maintaining emotional composure to manage mental health symptoms  During this session, this clinical used the following therapeutic modalities engagement strategies, supportive psychotherapy, client-centered therapy, stress management skills and CBT techniques  Substance Abuse was not addressed during this session   If the client is diagnosed with a " "co-occurring substance use disorder, please indicate any changes in the frequency or amount of use: N/A  Stage of change for addressing substance use diagnoses: No substance use/Not applicable    ASSESSMENT:  Lauren Dupree presents with a Anxious and Dysthymic mood  her affect is tearful, which is congruent, with his mood and the content of the session  Lauren Dupree was oriented x3  She was focused and engaged  Lauren Dupree exhibits good therapeutic rapport with this clinician  The client has not made progress on their goals  Lona Gil presents with a none risk of suicide, none risk of self-harm, and none risk of harm to others  For any risk assessment that surpasses a \"low\" rating, a safety plan must be developed  A safety plan was indicated: no  If yes, describe in detail: N/A    PLAN: Lauren Dupree will return in 2 weeks for the next scheduled session  At the next session, the therapist will use engagement strategies, supportive psychotherapy and client-centered therapy to address her anxiety  Behavioral Health Treatment Plan and Discharge Planning: Lona Gil is aware of and agrees to continue to work on their treatment plan  They have identified and are working toward their discharge goals   yes    Visit start and stop times:    06/01/23  Start Time: 1105  Stop Time: 1152  Total Visit Time: 47 minutes      "

## 2023-06-01 NOTE — PROGRESS NOTES
"OB/GYN  PN Visit  Erick Rice  [id number]  6/2/2023  10:54 AM  Dr Robin Frost MD    S: 43 y o  [place] 21w5d here for PN visit  Chief Complaint   Patient presents with   • Routine Prenatal Visit     Itching burning and cottage cheese discharge  During the day thicker clear discharge  Within past week  Taking sinus mucinex am/pm  Bp: 141/91, 138/90, etc  A lot of nose bleeds as well especially when leaning forward  And lot more leg cramping at night  Hydrating enough in a day  Does Pt for back: wanted to know if she can use a massage gun device  Needs refill on enaxaparin  She has a h/o chronic HTN, not on meds, was on lisinopril and metoprolol, has been off med since Jan 2023  She reports sinus congestion  She has nasal allergies - she is taking SINGULAIR and CLARITIN  She reports nose bleeds    She has vaginal itching and burning with discharge, cottage cheese in am, clear thick  She has no burning with urination      OB complaints:  Contractions: none  Leakage: no  Bleeding: no  Fetal movement: yes      O:  /74 (BP Location: Right arm, Patient Position: Sitting, Cuff Size: Large)   Pulse 99   Ht 5' 11 5\" (1 816 m)   Wt 112 kg (246 lb)   LMP 01/06/2023   BMI 33 83 kg/m²       Review of Systems   Constitutional: Negative  HENT: Negative  Eyes: Negative  Respiratory: Negative  Cardiovascular: Negative  Gastrointestinal: Negative  Endocrine: Negative  Genitourinary:        As noted in HPI   Musculoskeletal: Negative  Skin: Negative  Allergic/Immunologic: Negative  Neurological: Negative  Hematological: Negative  Psychiatric/Behavioral: Negative  Physical Exam  Constitutional:       General: She is not in acute distress  Appearance: Normal appearance  Genitourinary:      Right Labia: No rash, tenderness, lesions, skin changes or Bartholin's cyst      Left Labia: No tenderness, lesions, skin changes, Bartholin's cyst or rash       No " labial fusion noted  No inguinal adenopathy present in the right or left side  Vaginal discharge present  No vaginal bleeding  Pelvic exam was performed with patient in the lithotomy position  HENT:      Head: Normocephalic  Cardiovascular:      Rate and Rhythm: Normal rate  Pulmonary:      Effort: Pulmonary effort is normal    Abdominal:      General: There is no distension  Palpations: Abdomen is soft  Tenderness: There is no abdominal tenderness  There is no guarding  Hernia: There is no hernia in the left inguinal area or right inguinal area  Musculoskeletal:         General: No swelling or deformity  Lymphadenopathy:      Lower Body: No right inguinal adenopathy  No left inguinal adenopathy  Neurological:      General: No focal deficit present  Mental Status: She is alert and oriented to person, place, and time  Skin:     General: Skin is warm and dry  Psychiatric:         Mood and Affect: Mood normal          Behavior: Behavior normal    Vitals reviewed       Cervix appears closed        Pregravid Weight/BMI: 91 6 kg (202 lb) (BMI 27 78)  Current Weight: 112 kg (246 lb)   Total Weight Gain: 20 kg (44 lb)   Pre-Sania Vitals    Flowsheet Row Most Recent Value   Prenatal Assessment    Fetal Heart Rate 146   Movement Present   Prenatal Vitals    Blood Pressure 130/74   Weight - Scale 112 kg (246 lb)   Urine Albumin/Glucose    Dilation/Effacement/Station    Vaginal Drainage    Edema            Problem List        Digestive    GERD (gastroesophageal reflux disease)       Endocrine    PCOS (polycystic ovarian syndrome)       Respiratory    Mild intermittent asthma without complication    Overview     Inhalers PRN         Asthma    Overview     Inhalers PRN            Cardiovascular and Mediastinum    Chronic hypertension affecting pregnancy    Overview     No meds currently   Was previously on lisinopril and metoprolol - after weight loss no metoprolol   Stopped lisinopril January 2023   Baseline labs normal, UPC undetectable   Aspirin recommended         Migraine       Genitourinary    Interstitial cystitis       Other    History of pulmonary embolism    Overview     This was unprovoked in the setting of oral contraceptive use  She had a thorough thrombophilia work-up which can be seen in her lab reports from August 18, 2022 which was negative for inherited or acquired thrombophilia  She is recommended to continue Lovenox prophylaxis throughout her pregnancy and 6 weeks postpartum         Family history of breast cancer    Overview     PGM @ 78         Mass of lower inner quadrant of left breast    Overview      US 3/2023: Previously noted area of probable fat necrosis in the 7 o'clock position of the left breast, 5 cm from the nipple has significantly decreased in size currently measuring 5 x 4 x 7 mm  6 month f/u scheduled 10/4/23         Supervision of high-risk pregnancy    Overview     ASA recommended  Flu vaccine:  completed  Covid vaccine:  completed X 5  NIPS/ MsAFP- negative           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    Overview     Patient is a carrier for CF, alpha thalassemia, Shona Douglass - had carrier screening with [facility]   Partner testing:  pending         17 weeks gestation of pregnancy    Depression affecting pregnancy    Overview     Stopped topomax, buspar, prozac with + pregnancy  Behavioral health referral sent         Medication exposure during first trimester of pregnancy    Overview     Stopped topomax with + pregnancy test         Obesity affecting pregnancy    Overview     Pre gestational BMI 30  Early glucola: 3/21 117         Group B streptococcal bacteriuria    Overview     + GBS in urine @ 14 weeks:    Will need to treat in labor         Primigravida of advanced maternal age in second trimester   Other Visit Diagnoses     BV (bacterial vaginosis)        Allergic rhinitis due to other allergic trigger, unspecified seasonality She may take [facility], referred to ENT for sinus problems, allergic rhinitis  Advised humidifier  Saline nasal spray, saline gel   She has been taking Tylenol extra strength  She Excedrin tension - she has Reglan but this does not seem to help, has seen neurology  She has been taking magesium oxide and vitamin B2   She does do pelvic physical therapy, she reports tightness in buttocks and hip area  Advised no massage gun for now  Bacterial vaginosis diagnosed on examination today  Advised patient Flagyl  Lotrisone was prescribed to use as needed  Advised to call if with continued symptoms  Urine culture and sensitivity was sent for ruling out urinary tract infection      Follow-up in 4 weeks      Future Appointments   Date Time Provider Sohail Palumbo   2023  4:15 PM Wen Azevedo, PT [place] PT [facility]   6/15/2023 11:00 AM Rafita Moss LCSW PSYCH [facility]   6/15/2023  4:15 PM OW MRI 1 OW MRI [facility]   2023  6:00 PM [facility]   2023  8:30 AM Andria Ryan MD NEURO CTR VL [facility]   2023 11:00 AM Rafita Moss LCSW PSYCH [facility]   2023  9:30 AM Natty Hernandez MD Complete WC [facility]   2023  6:00 PM PREPARED CHILDBIRTH BE OPTION 2 (WEEK 1) BABY AND ME [facility]   2023 11:30 AM  US 2 [address] [place] [address]   10/4/2023  9:00 AM OW US MOB 1 OW MOB US OW MOB               Robin Frost MD  2023  10:54 AM

## 2023-06-01 NOTE — PROGRESS NOTES
PT Re-Evaluation     Today's date: 2023  Patient name: Bridgett Smiley  : 1985  MRN: [id number]  Referring provider: Dolly Marte PA-C  Dx:   Encounter Diagnosis     ICD-10-CM    1  Low back pain during pregnancy in second trimester  O26 892     M54 50                      Assessment  Assessment details: Patient is a 44 y o  female, 17 weeks pregnant with her first child, who presents to PT with LBP that radiates to the right  She does demonstrate limited flexibility of the into hip IR and piriformis musculature  Patient has good strength of the BLE  She does have mild strength deficits of the abdominal musculature at 3+-4/5  Patient has tenderness upon palpation to the right SI joint  Patient does have a PMH significant for IC and BV  UPDATE 23  Patient continues to have pain in the L/S region that radiates to the right buttock and lower extremity  Symptoms do vary in intensity however this week pain is increased to 8-9/10  Patient has palpable muscular hypertonicity of the piriformis musculature  She is able to complete L/S AROM exercise with cues with some pain relief  She responds well to [facility] and IAS was trailed this date  Will continue PT intervention to decrease pain and increase patient's tolerance to activity as she progresses through her pregnancy  Impairments: abnormal or restricted ROM, lacks appropriate home exercise program and pain with function    Symptom irritability: highUnderstanding of Dx/Px/POC: excellent  Goals  STG - 2 weeks -progressing  Patient able to tolerate sitting for 60 minutes without increased LBP  Patient to report pain at worst 4/10 with daily activity  Patient to have increased flexibility of the B hips into IR  by 5-10 degrees  Patient to be independent with HEP  LTG - 6 weeks- progressing  Patient able to demonstrate painfree AROM fo the L/S  Patient able to demonstrate good posture in sitting and standing    Patient to demonstrate core strength at  4/5   Patient to have minimal to no tenderness with palpation to the right SI joint  Patient to be independent with final HEP  Plan  Plan details: Patient's evaluation is completed  Patient was instructed with a HEP this date  Patient has scheduled further PT sessions for treatment  Patient would benefit from: skilled physical therapy  Planned modality interventions: cryotherapy and thermotherapy: hydrocollator packs  Planned therapy interventions: manual therapy, neuromuscular re-education, therapeutic activities, therapeutic exercise and home exercise program  Frequency: 2x week  Duration in weeks: 6  Plan of Care beginning date: 6/1/2023  Plan of Care expiration date: 7/13/2023        Subjective Evaluation    History of Present Illness  Mechanism of injury: Patient notes that she is pregnant and has pain in the low back that radiates to the right that is jabbing in nature  She notes a hx of pelvic floor issues  She notes that she does have a hx of scoliosis  Patient notes that symptoms started in March  She is 17 weeks into her pregnancy  She has a hx of IC (2010)  Hx of bacterial vaginosis (2016)  She notes that she has had issues that were so severe that she was unable to urinary due to pain  Vaginal pressure symptoms currently  She notes burning symptoms  She notes that she does try to get up and move every hour at work  She does not tolerate R sidely  She does have pain with penetration that decreases with time  Every two hours she is urinating  She primarily drink water  She has tried a physioball to roll her hips - self stretching  UDPATE 6/1/23  Patient notes that her pain is increased this date 8-9/10 in the L/S into the RLE  Patient does continue with sleep disturbance due to pain symptoms  She notes that use of heat and topical analgesics are helpful but do not provide lasting relief   She notes that she does use a tennis ball for self massage of the piriformis and gluteals but again it does not provide lasting relief  She will undergo MRI and CT scan in the near future for further diagnosis  Pain  Current pain ratin  At best pain ratin  At worst pain ratin  Location: Right LB   Quality: throbbing and radiating  Relieving factors: heat, medications and support    Social Support  Lives in: multiple-level home  Lives with: significant other    Employment status: working  Patient Goals  Patient goals for therapy: decreased pain          Objective     Postural Observations  Seated posture: good  Standing posture: good        Tenderness     Additional Tenderness Details  Tenderness is noted with palpation to the right SI joint    Neurological Testing     Sensation     Lumbar   Left   Intact: light touch    Right   Intact: light touch    Active Range of Motion     Lumbar   Flexion:  Restriction level: minimal  Extension:  WFL  Left lateral flexion:  WFL  Right lateral flexion:  WFL  Left rotation:  WF  [facility]  [facility]    Strength/Myotome Testing     Left Hip   Planes of Motion   Flexion: 4+  Extension: 4+  Abduction: 4+  Adduction: 4+  External rotation: 4+  Internal rotation: 4+    Right Hip   Planes of Motion   Flexion: 4+  Extension: 4+  Abduction: 4+  Adduction: 4+  External rotation: 4+  Internal rotation: 4+    Left Knee   Flexion: 4+  Extension: 4+    Right Knee   Flexion: 4+  Extension: 4+    General Comments:      Lumbar Comments  Patient demonstrates mild swelling into the BLE  Flexibility-  HS flexibility to 85 degrees B  Piriformis flexibility decreased 25%  Hip IR limited B - R 20-25 degrees, Left 15-20 degrees    UPDATE 23  Flexibility-  HS flexibility to 90 degrees B  Piriformis flexibility decreased 10%  Hip IR limited B - R 25 degrees, Left 120 degrees             Precautions: PREGNANCY / anti-coag therapy (lovenox/ baby aspirin)        Daily Treatment Diary:      Initial Evaluation Date: 23  Compliance  "       Visit Number 1 2  3  4  5  6  7         Re-Eval  IE -  -  -  -             Foto Captured Y -  -  -  -                    5/1 5/4 5/8 5/11 5/17 5/25 5/30         Manual                      Stretch IR/ Piriformis 30\" x5  30\" x5  30\" x5  30\"x5 IR/Piriformis/ SKTC/ HS  30\"x5IR/ Piriformis/ SKTC/ HS 15 min  30\" x3 IR/ Piriformis/ SKTC/ HS  30\"x3 IR/ piriformis/ SKTC HS 15 min         STM -  left sidely 10 min  10 min  15 min  10 min  10 min  10 min         Kinesio tape -        5 min 2 I strips on erector spinea  5 min space correction  5 min 2 I strips with 1 space correction strip         Ther-Ex                      LTR   5\" x10  5\" x10  5\"x10  5\" x10  5\" x10  5\" x10         TA set with PPT   2 min  2 min  5\" x15  5\" x15  5\" x15  5\" x15         TA with march   2 min  -                 Self IR stretch         10\" x5  10\" x5  10\" x5         Supine clamshell   Blue 2 min  ----->  --->               Supine add   2 min  ---->  --->               Standing hip flexor stretch   20\" x3 ea  --->  ---->               BKFO     x10  x10    -           Cat cow     5\" x5  5\"x10    -           Lucrecia pose     5\" x5  5\" x10    -  p-ball roll out x2         Neuro Re-Ed                                                                                                Ther-Act                                                               Modalities                      HP/ CP  HP in left sidely x10 min HP in left slidely x10 min HP x10 min HP x10 min HP x10 min HP x10 min                                                Access Code: [email or web address]  URL: [email or web address]  Date: 05/01/2023  Prepared by: Nely Remy    Exercises  - Caregiver PROM Hip Internal Rotation  - 1 x daily - 7 x weekly - 1 sets - 5 reps - 20-30 hold  - Self IR stretch  - 1 x daily - 7 x weekly - 1 sets - 10 reps - 10 hold  - Supine Piriformis Stretch with Foot on Ground  - 1 x daily - 7 x weekly - 1 sets - 5 reps - 20-30 hold         "

## 2023-06-02 ENCOUNTER — ROUTINE PRENATAL (OUTPATIENT)
Dept: OBGYN CLINIC | Facility: CLINIC | Age: 38
End: 2023-06-02

## 2023-06-02 VITALS
HEIGHT: 72 IN | HEART RATE: 99 BPM | DIASTOLIC BLOOD PRESSURE: 74 MMHG | WEIGHT: 246 LBS | SYSTOLIC BLOOD PRESSURE: 130 MMHG | BODY MASS INDEX: 33.32 KG/M2

## 2023-06-02 DIAGNOSIS — N76.0 BV (BACTERIAL VAGINOSIS): ICD-10-CM

## 2023-06-02 DIAGNOSIS — O10.919 CHRONIC HYPERTENSION AFFECTING PREGNANCY: ICD-10-CM

## 2023-06-02 DIAGNOSIS — O09.891 MEDICATION EXPOSURE DURING FIRST TRIMESTER OF PREGNANCY: Primary | ICD-10-CM

## 2023-06-02 DIAGNOSIS — Z3A.17 17 WEEKS GESTATION OF PREGNANCY: ICD-10-CM

## 2023-06-02 DIAGNOSIS — J30.89 ALLERGIC RHINITIS DUE TO OTHER ALLERGIC TRIGGER, UNSPECIFIED SEASONALITY: ICD-10-CM

## 2023-06-02 DIAGNOSIS — R82.71 GROUP B STREPTOCOCCAL BACTERIURIA: ICD-10-CM

## 2023-06-02 DIAGNOSIS — O09.92 SUPERVISION OF HIGH RISK PREGNANCY IN SECOND TRIMESTER: ICD-10-CM

## 2023-06-02 DIAGNOSIS — B96.89 BV (BACTERIAL VAGINOSIS): ICD-10-CM

## 2023-06-02 DIAGNOSIS — Z86.711 HISTORY OF PULMONARY EMBOLISM: ICD-10-CM

## 2023-06-02 DIAGNOSIS — Z14.8 CARRIER OF GENETIC DISORDER: ICD-10-CM

## 2023-06-02 LAB
AMORPH URATE CRY URNS QL MICRO: ABNORMAL
BACTERIA UR QL AUTO: ABNORMAL /HPF
BILIRUB UR QL STRIP: NEGATIVE
BV WHIFF TEST VAG QL: ABNORMAL
CLARITY UR: CLEAR
CLUE CELLS SPEC QL WET PREP: PRESENT
COLOR UR: ABNORMAL
GLUCOSE UR STRIP-MCNC: NEGATIVE MG/DL
HGB UR QL STRIP.AUTO: NEGATIVE
KETONES UR STRIP-MCNC: NEGATIVE MG/DL
LEUKOCYTE ESTERASE UR QL STRIP: NEGATIVE
MUCOUS THREADS UR QL AUTO: ABNORMAL
NITRITE UR QL STRIP: NEGATIVE
NON-SQ EPI CELLS URNS QL MICRO: ABNORMAL /HPF
PH SMN: 5 [PH]
PH UR STRIP.AUTO: 6 [PH]
PROT UR STRIP-MCNC: ABNORMAL MG/DL
RBC #/AREA URNS AUTO: ABNORMAL /HPF
SP GR UR STRIP.AUTO: 1.02 (ref 1–1.03)
T VAGINALIS VAG QL WET PREP: ABNORMAL
URATE CRY URNS QL MICRO: ABNORMAL /HPF
UROBILINOGEN UR STRIP-ACNC: <2 MG/DL
WBC #/AREA URNS AUTO: ABNORMAL /HPF
YEAST VAG QL WET PREP: ABNORMAL

## 2023-06-02 PROCEDURE — 87147 CULTURE TYPE IMMUNOLOGIC: CPT | Performed by: OBSTETRICS & GYNECOLOGY

## 2023-06-02 PROCEDURE — 87086 URINE CULTURE/COLONY COUNT: CPT | Performed by: OBSTETRICS & GYNECOLOGY

## 2023-06-02 PROCEDURE — 81001 URINALYSIS AUTO W/SCOPE: CPT | Performed by: OBSTETRICS & GYNECOLOGY

## 2023-06-02 RX ORDER — FLUTICASONE PROPIONATE 50 MCG
1 SPRAY, SUSPENSION (ML) NASAL DAILY
Qty: 15.8 ML | Refills: 3 | Status: SHIPPED | OUTPATIENT
Start: 2023-06-02

## 2023-06-02 RX ORDER — CLOTRIMAZOLE AND BETAMETHASONE DIPROPIONATE 10; .64 MG/G; MG/G
CREAM TOPICAL 2 TIMES DAILY PRN
Qty: 30 G | Refills: 3 | Status: SHIPPED | OUTPATIENT
Start: 2023-06-02

## 2023-06-02 RX ORDER — METRONIDAZOLE 500 MG/1
500 TABLET ORAL EVERY 12 HOURS SCHEDULED
Qty: 14 TABLET | Refills: 0 | Status: SHIPPED | OUTPATIENT
Start: 2023-06-02 | End: 2023-06-09

## 2023-06-02 RX ORDER — ENOXAPARIN SODIUM 100 MG/ML
40 INJECTION SUBCUTANEOUS DAILY
Qty: 12 ML | Refills: 3 | Status: SHIPPED | OUTPATIENT
Start: 2023-06-02 | End: 2023-07-02

## 2023-06-03 LAB — BACTERIA UR CULT: ABNORMAL

## 2023-06-05 ENCOUNTER — APPOINTMENT (OUTPATIENT)
Dept: PHYSICAL THERAPY | Facility: CLINIC | Age: 38
End: 2023-06-05
Payer: COMMERCIAL

## 2023-06-08 ENCOUNTER — OFFICE VISIT (OUTPATIENT)
Dept: PHYSICAL THERAPY | Facility: CLINIC | Age: 38
End: 2023-06-08
Payer: COMMERCIAL

## 2023-06-08 DIAGNOSIS — O26.892 LOW BACK PAIN DURING PREGNANCY IN SECOND TRIMESTER: Primary | ICD-10-CM

## 2023-06-08 DIAGNOSIS — M54.50 LOW BACK PAIN DURING PREGNANCY IN SECOND TRIMESTER: Primary | ICD-10-CM

## 2023-06-08 PROCEDURE — 97140 MANUAL THERAPY 1/> REGIONS: CPT | Performed by: PHYSICAL THERAPIST

## 2023-06-08 PROCEDURE — 97110 THERAPEUTIC EXERCISES: CPT | Performed by: PHYSICAL THERAPIST

## 2023-06-08 NOTE — PROGRESS NOTES
"Daily Note     Today's date: 2023  Patient name: Ashleigh Davidson  : 1985  MRN: [id number]  Referring provider: Sujata Brown PA-C  Dx:   Encounter Diagnosis     ICD-10-CM    1  Low back pain during pregnancy in second trimester  O26 892     M54 50                      Subjective: Patient notes Gi Gulling is 9/10 this date  She notes difficulty sleeping and will likely start a new med to help with this  Patient is had a UTI at this time that is being treated with a antibiotic  She also has bacterial vaginosis that is being treated with a antibiotic as well  Patient notes that her back pain is now radiating into the right upper back  She also is experiencing nerve like pain into the left anterior thigh  Objective: See treatment diary below      Assessment: Tolerated treatment well  Patient would benefit from continued PT  Patient was able to tolerate stretching and AROM well this date  Patient continues with muscular tenderness at the right buttock and paraspinal musculature  No muscular hypertonicity is noted in the erector spinae at this time  She did have decreased pain from 9/10 to 6/10 with treatment this date  Plan: Continue per plan of care  Precautions: PREGNANCY / anti-coag therapy (lovenox/ baby aspirin)        Daily Treatment Diary:      Initial Evaluation Date: 23  Compliance      Visit Number 1 2  3  4  5  6  7  8  9     Re-Eval  IE -  -  -  -          MC   Foto Captured Y -  -  -  -                          Manual                      Stretch IR/ Piriformis 30\" x5  30\" x5  30\" x5  30\"x5 IR/Piriformis/ SKTC/ HS  30\"x5IR/ Piriformis/ SKTC/ HS 15 min  30\" x3 IR/ Piriformis/ SKTC/ HS  30\"x3 IR/ piriformis/ SKTC HS 15 min  30\" x3 IR/ piriformis/ SKTC/ HS 15 min  30\" x3 IR/ piriformis/ SKTC/ HS 15 min     STM -  left sidely 10 min  10 min  15 min  10 min  10 min  10 min  10 min with " "addition of IASTM  15 min with IASTM/ tiger tail     Kinesio tape -        5 min 2 I strips on erector spinea  5 min space correction  5 min 2 I strips with 1 space correction strip  held due to skin  -     Ther-Ex                      LTR   5\" x10  5\" x10  5\"x10  5\" x10  5\" x10  5\" x10  5\" x10  5\" x10     TA set with PPT   2 min  2 min  5\" x15  5\" x15  5\" x15  5\" x15  5\" x15  5\"x15     TA with march   2 min  -            x10     Self IR stretch         10\" x5  10\" x5  10\" x5  10\" x5  10\" x5     Supine clamshell   Blue 2 min  ----->  --->               Supine add   2 min  ---->  --->               Standing hip flexor stretch   20\" x3 ea  --->  ---->               BKFO     x10  x10    -           Cat cow     5\" x5  5\"x10    -           Lucrecia pose     5\" x5  5\" x10    -  p-ball roll out x2  -       Neuro Re-Ed                                                                                                Ther-Act                                                               Modalities                      HP/ CP  HP in left sidely x10 min HP in left slidely x10 min HP x10 min HP x10 min HP x10 min HP x10 min  HP x10 min                                              Access Code: [email or web address]  URL: [email or web address]  Date: 05/01/2023  Prepared by: Tony Rome    Exercises  - Caregiver PROM Hip Internal Rotation  - 1 x daily - 7 x weekly - 1 sets - 5 reps - 20-30 hold  - Self IR stretch  - 1 x daily - 7 x weekly - 1 sets - 10 reps - 10 hold  - Supine Piriformis Stretch with Foot on Ground  - 1 x daily - 7 x weekly - 1 sets - 5 reps - 20-30 hold           "

## 2023-06-13 ENCOUNTER — OFFICE VISIT (OUTPATIENT)
Dept: PHYSICAL THERAPY | Facility: CLINIC | Age: 38
End: 2023-06-13
Payer: COMMERCIAL

## 2023-06-13 DIAGNOSIS — M54.50 LOW BACK PAIN DURING PREGNANCY IN SECOND TRIMESTER: Primary | ICD-10-CM

## 2023-06-13 DIAGNOSIS — O26.892 LOW BACK PAIN DURING PREGNANCY IN SECOND TRIMESTER: Primary | ICD-10-CM

## 2023-06-13 PROCEDURE — 97140 MANUAL THERAPY 1/> REGIONS: CPT | Performed by: PHYSICAL THERAPIST

## 2023-06-13 PROCEDURE — 97110 THERAPEUTIC EXERCISES: CPT | Performed by: PHYSICAL THERAPIST

## 2023-06-13 NOTE — PROGRESS NOTES
"Daily Note     Today's date: 2023  Patient name: Arnoldo Duque  : 1985  MRN: [id number]  Referring provider: Markus Vivas PA-C  Dx:   Encounter Diagnosis     ICD-10-CM    1  Low back pain during pregnancy in second trimester  O26 892     M54 50                      Subjective: Patient notes that pain is less severe in the low back however the right T/S symptoms have increased  Patient notes the BV and UTI are improving  She notes she was able to get in a swimming pool over the weekend which was very helpful with the L/S and hip pain symptoms  Objective: See treatment diary below      Assessment: Tolerated treatment well  Patient would benefit from continued PT to improve pain symptoms and improve functional mobility  Added open book stretching and doorway pectoral stretching  Plan: Continue per plan of care  Precautions: PREGNANCY / anti-coag therapy (lovenox/ baby aspirin)        Daily Treatment Diary:      Initial Evaluation Date: 23  Compliance    Visit Number 1 2  3  4  5  6  7  8  9  10   Re-Eval  IE -  -  -  -      Y      Foto Captured Y -  -  -  -                        Manual                      Stretch IR/ Piriformis 30\" x5  30\" x5  30\" x5  30\"x5 IR/Piriformis/ SKTC/ HS  30\"x5IR/ Piriformis/ SKTC/ HS 15 min  30\" x3 IR/ Piriformis/ SKTC/ HS  30\"x3 IR/ piriformis/ SKTC HS 15 min  30\" x3 IR/ piriformis/ SKTC/ HS 15 min  30\" x3 IR/ piriformis/ SKTC/ HS 15 min  30\" x2 IR/ piriformis/ HS   STM -  left sidely 10 min  10 min  15 min  10 min  10 min  10 min  10 min with addition of IASTM  15 min with IASTM/ tiger tail  10 min   Kinesio tape -        5 min 2 I strips on erector spinea  5 min space correction  5 min 2 I strips with 1 space correction strip  held due to skin  -  -   Ther-Ex                      LTR   5\" x10  5\" x10  5\"x10  5\" x10  5\" x10  5\" x10  5\" x10  " "5\" x10  5\" x10   TA set with PPT   2 min  2 min  5\" x15  5\" x15  5\" x15  5\" x15  5\" x15  5\"x15  5\" x15   TA with march   2 min  -            x10  x10   Self IR stretch         10\" x5  10\" x5  10\" x5  10\" x5  10\" x5  10\"x5   Supine clamshell   Blue 2 min  ----->  --->               Supine add   2 min  ---->  --->               Standing hip flexor stretch   20\" x3 ea  --->  ---->               BKFO     x10  x10    -           Cat cow     5\" x5  5\"x10    -           doorwar pectoral stretch          10\" x5   Open books          10\" x5   Seated t/s extension          --->                Lucrecia pose     5\" x5  5\" x10    -  p-ball roll out x2  -       Neuro Re-Ed                                                                                                Ther-Act                                                               Modalities                      HP/ CP  HP in left sidely x10 min HP in left slidely x10 min HP x10 min HP x10 min HP x10 min HP x10 min  HP x10 min HP 10 min                                             Access Code: [email or web address]  URL: [email or web address]  Date: 05/01/2023  Prepared by: Manolo Kerr    Exercises  - Caregiver PROM Hip Internal Rotation  - 1 x daily - 7 x weekly - 1 sets - 5 reps - 20-30 hold  - Self IR stretch  - 1 x daily - 7 x weekly - 1 sets - 10 reps - 10 hold  - Supine Piriformis Stretch with Foot on Ground  - 1 x daily - 7 x weekly - 1 sets - 5 reps - 20-30 hold             "

## 2023-06-15 ENCOUNTER — HOSPITAL ENCOUNTER (OUTPATIENT)
Dept: MRI IMAGING | Facility: HOSPITAL | Age: 38
End: 2023-06-15
Attending: PSYCHIATRY & NEUROLOGY
Payer: COMMERCIAL

## 2023-06-15 ENCOUNTER — APPOINTMENT (OUTPATIENT)
Dept: LAB | Facility: CLINIC | Age: 38
End: 2023-06-15
Payer: COMMERCIAL

## 2023-06-15 ENCOUNTER — TELEPHONE (OUTPATIENT)
Dept: OBGYN CLINIC | Facility: CLINIC | Age: 38
End: 2023-06-15

## 2023-06-15 DIAGNOSIS — N39.0 URINARY TRACT INFECTION WITHOUT HEMATURIA, SITE UNSPECIFIED: ICD-10-CM

## 2023-06-15 DIAGNOSIS — N39.0 URINARY TRACT INFECTION WITHOUT HEMATURIA, SITE UNSPECIFIED: Primary | ICD-10-CM

## 2023-06-15 DIAGNOSIS — G43.709 CHRONIC MIGRAINE WITHOUT AURA WITHOUT STATUS MIGRAINOSUS, NOT INTRACTABLE: ICD-10-CM

## 2023-06-15 DIAGNOSIS — Z3A.21 21 WEEKS GESTATION OF PREGNANCY: ICD-10-CM

## 2023-06-15 LAB
AMORPH URATE CRY URNS QL MICRO: ABNORMAL
BACTERIA UR QL AUTO: ABNORMAL /HPF
BILIRUB UR QL STRIP: NEGATIVE
CLARITY UR: ABNORMAL
COLOR UR: YELLOW
GLUCOSE UR STRIP-MCNC: NEGATIVE MG/DL
HGB UR QL STRIP.AUTO: NEGATIVE
KETONES UR STRIP-MCNC: NEGATIVE MG/DL
LEUKOCYTE ESTERASE UR QL STRIP: NEGATIVE
MUCOUS THREADS UR QL AUTO: ABNORMAL
NITRITE UR QL STRIP: NEGATIVE
NON-SQ EPI CELLS URNS QL MICRO: ABNORMAL /HPF
PH UR STRIP.AUTO: 6.5 [PH]
PROT UR STRIP-MCNC: ABNORMAL MG/DL
RBC #/AREA URNS AUTO: ABNORMAL /HPF
SP GR UR STRIP.AUTO: 1.02 (ref 1–1.03)
UROBILINOGEN UR STRIP-ACNC: <2 MG/DL
WBC #/AREA URNS AUTO: ABNORMAL /HPF

## 2023-06-15 PROCEDURE — 70551 MRI BRAIN STEM W/O DYE: CPT

## 2023-06-15 PROCEDURE — 87086 URINE CULTURE/COLONY COUNT: CPT

## 2023-06-15 PROCEDURE — G1004 CDSM NDSC: HCPCS

## 2023-06-15 PROCEDURE — 81001 URINALYSIS AUTO W/SCOPE: CPT

## 2023-06-15 NOTE — TELEPHONE ENCOUNTER
Pt called stating that she just finished ABX for UTI and is still having sx  She thought there was a standing order in her chart for urine culture, so went to the lab to have this done but none in her chart  Can you please order this? Thank you

## 2023-06-16 ENCOUNTER — APPOINTMENT (OUTPATIENT)
Dept: ULTRASOUND IMAGING | Facility: HOSPITAL | Age: 38
End: 2023-06-16
Payer: COMMERCIAL

## 2023-06-16 ENCOUNTER — TELEPHONE (OUTPATIENT)
Dept: OBGYN CLINIC | Facility: CLINIC | Age: 38
End: 2023-06-16

## 2023-06-16 ENCOUNTER — HOSPITAL ENCOUNTER (OUTPATIENT)
Facility: HOSPITAL | Age: 38
Setting detail: OBSERVATION
Discharge: HOME | End: 2023-06-17
Attending: OBSTETRICS & GYNECOLOGY | Admitting: OBSTETRICS & GYNECOLOGY
Payer: COMMERCIAL

## 2023-06-16 ENCOUNTER — ROUTINE PRENATAL (OUTPATIENT)
Age: 38
End: 2023-06-16
Payer: COMMERCIAL

## 2023-06-16 VITALS
DIASTOLIC BLOOD PRESSURE: 78 MMHG | BODY MASS INDEX: 34.1 KG/M2 | SYSTOLIC BLOOD PRESSURE: 136 MMHG | WEIGHT: 251.8 LBS | TEMPERATURE: 98.7 F | HEART RATE: 89 BPM | HEIGHT: 72 IN

## 2023-06-16 DIAGNOSIS — N89.8 VAGINAL DISCHARGE: ICD-10-CM

## 2023-06-16 DIAGNOSIS — G43.809 OTHER MIGRAINE WITHOUT STATUS MIGRAINOSUS, NOT INTRACTABLE: ICD-10-CM

## 2023-06-16 DIAGNOSIS — N30.00 ACUTE CYSTITIS WITHOUT HEMATURIA: ICD-10-CM

## 2023-06-16 DIAGNOSIS — Z14.8 CARRIER OF GENETIC DISORDER: ICD-10-CM

## 2023-06-16 DIAGNOSIS — Z11.3 SCREENING FOR STD (SEXUALLY TRANSMITTED DISEASE): ICD-10-CM

## 2023-06-16 DIAGNOSIS — O09.891 MEDICATION EXPOSURE DURING FIRST TRIMESTER OF PREGNANCY: Primary | ICD-10-CM

## 2023-06-16 DIAGNOSIS — N76.0 BV (BACTERIAL VAGINOSIS): Primary | ICD-10-CM

## 2023-06-16 DIAGNOSIS — B96.89 BV (BACTERIAL VAGINOSIS): Primary | ICD-10-CM

## 2023-06-16 DIAGNOSIS — Z3A.23 23 WEEKS GESTATION OF PREGNANCY: ICD-10-CM

## 2023-06-16 DIAGNOSIS — O10.919 CHRONIC HYPERTENSION AFFECTING PREGNANCY: ICD-10-CM

## 2023-06-16 DIAGNOSIS — R82.71 GROUP B STREPTOCOCCAL BACTERIURIA: ICD-10-CM

## 2023-06-16 DIAGNOSIS — O09.92 SUPERVISION OF HIGH RISK PREGNANCY IN SECOND TRIMESTER: ICD-10-CM

## 2023-06-16 PROBLEM — N39.0 UTI (URINARY TRACT INFECTION): Status: ACTIVE | Noted: 2023-06-16

## 2023-06-16 LAB
ABO GROUP BLD: NORMAL
ALBUMIN SERPL BCP-MCNC: 3.5 G/DL (ref 3.5–5)
ALP SERPL-CCNC: 31 U/L (ref 34–104)
ALT SERPL W P-5'-P-CCNC: 16 U/L (ref 7–52)
ANION GAP SERPL CALCULATED.3IONS-SCNC: 6 MMOL/L (ref 4–13)
AST SERPL W P-5'-P-CCNC: 12 U/L (ref 13–39)
BILIRUB SERPL-MCNC: 0.19 MG/DL (ref 0.2–1)
BLD GP AB SCN SERPL QL: NEGATIVE
BUN SERPL-MCNC: 8 MG/DL (ref 5–25)
BV WHIFF TEST VAG QL: NEGATIVE
CALCIUM SERPL-MCNC: 9.1 MG/DL (ref 8.4–10.2)
CHLORIDE SERPL-SCNC: 108 MMOL/L (ref 96–108)
CLUE CELLS SPEC QL WET PREP: NEGATIVE
CO2 SERPL-SCNC: 22 MMOL/L (ref 21–32)
CREAT SERPL-MCNC: 0.55 MG/DL (ref 0.6–1.3)
ERYTHROCYTE [DISTWIDTH] IN BLOOD BY AUTOMATED COUNT: 13.2 % (ref 11.6–15.1)
GFR SERPL CREATININE-BSD FRML MDRD: 118 ML/MIN/1.73SQ M
GLUCOSE SERPL-MCNC: 136 MG/DL (ref 65–140)
HCT VFR BLD AUTO: 34.8 % (ref 34.8–46.1)
HGB BLD-MCNC: 11.6 G/DL (ref 11.5–15.4)
HOLD SPECIMEN: YES
MCH RBC QN AUTO: 28.5 PG (ref 26.8–34.3)
MCHC RBC AUTO-ENTMCNC: 33.3 G/DL (ref 31.4–37.4)
MCV RBC AUTO: 86 FL (ref 82–98)
PH SMN: 4.5 [PH]
PLATELET # BLD AUTO: 250 THOUSANDS/UL (ref 149–390)
PMV BLD AUTO: 9.6 FL (ref 8.9–12.7)
POTASSIUM SERPL-SCNC: 3.4 MMOL/L (ref 3.5–5.3)
PROT SERPL-MCNC: 6.3 G/DL (ref 6.4–8.4)
RBC # BLD AUTO: 4.07 MILLION/UL (ref 3.81–5.12)
RH BLD: POSITIVE
SODIUM SERPL-SCNC: 136 MMOL/L (ref 135–147)
SPECIMEN EXPIRATION DATE: NORMAL
T VAGINALIS VAG QL WET PREP: NEGATIVE
WBC # BLD AUTO: 12.28 THOUSAND/UL (ref 4.31–10.16)
YEAST VAG QL WET PREP: NEGATIVE

## 2023-06-16 PROCEDURE — 87210 SMEAR WET MOUNT SALINE/INK: CPT | Performed by: PHYSICIAN ASSISTANT

## 2023-06-16 PROCEDURE — G0379 DIRECT REFER HOSPITAL OBSERV: HCPCS

## 2023-06-16 PROCEDURE — NC001 PR NO CHARGE: Performed by: OBSTETRICS & GYNECOLOGY

## 2023-06-16 PROCEDURE — 87480 CANDIDA DNA DIR PROBE: CPT | Performed by: PHYSICIAN ASSISTANT

## 2023-06-16 PROCEDURE — 87591 N.GONORRHOEAE DNA AMP PROB: CPT | Performed by: PHYSICIAN ASSISTANT

## 2023-06-16 PROCEDURE — 86901 BLOOD TYPING SEROLOGIC RH(D): CPT

## 2023-06-16 PROCEDURE — 87510 GARDNER VAG DNA DIR PROBE: CPT | Performed by: PHYSICIAN ASSISTANT

## 2023-06-16 PROCEDURE — 87660 TRICHOMONAS VAGIN DIR PROBE: CPT | Performed by: PHYSICIAN ASSISTANT

## 2023-06-16 PROCEDURE — 85027 COMPLETE CBC AUTOMATED: CPT

## 2023-06-16 PROCEDURE — 99213 OFFICE O/P EST LOW 20 MIN: CPT | Performed by: PHYSICIAN ASSISTANT

## 2023-06-16 PROCEDURE — 86850 RBC ANTIBODY SCREEN: CPT

## 2023-06-16 PROCEDURE — 80053 COMPREHEN METABOLIC PANEL: CPT

## 2023-06-16 PROCEDURE — 86900 BLOOD TYPING SEROLOGIC ABO: CPT

## 2023-06-16 PROCEDURE — 87491 CHLMYD TRACH DNA AMP PROBE: CPT | Performed by: PHYSICIAN ASSISTANT

## 2023-06-16 PROCEDURE — 76775 US EXAM ABDO BACK WALL LIM: CPT

## 2023-06-16 RX ORDER — ACETAMINOPHEN 325 MG/1
975 TABLET ORAL EVERY 6 HOURS PRN
Status: DISCONTINUED | OUTPATIENT
Start: 2023-06-16 | End: 2023-06-17 | Stop reason: HOSPADM

## 2023-06-16 RX ORDER — ALBUTEROL SULFATE 90 UG/1
2 AEROSOL, METERED RESPIRATORY (INHALATION) EVERY 4 HOURS PRN
Status: DISCONTINUED | OUTPATIENT
Start: 2023-06-16 | End: 2023-06-17 | Stop reason: HOSPADM

## 2023-06-16 RX ORDER — ONDANSETRON 2 MG/ML
4 INJECTION INTRAMUSCULAR; INTRAVENOUS EVERY 6 HOURS PRN
Status: DISCONTINUED | OUTPATIENT
Start: 2023-06-16 | End: 2023-06-17 | Stop reason: HOSPADM

## 2023-06-16 RX ORDER — FLUTICASONE PROPIONATE 50 MCG
1 SPRAY, SUSPENSION (ML) NASAL DAILY
Status: DISCONTINUED | OUTPATIENT
Start: 2023-06-17 | End: 2023-06-17 | Stop reason: HOSPADM

## 2023-06-16 RX ORDER — THIAMINE HCL 100 MG
400 TABLET ORAL DAILY
Status: DISCONTINUED | OUTPATIENT
Start: 2023-06-16 | End: 2023-06-17 | Stop reason: RX

## 2023-06-16 RX ORDER — LORATADINE 10 MG/1
10 TABLET ORAL DAILY
Status: DISCONTINUED | OUTPATIENT
Start: 2023-06-17 | End: 2023-06-17 | Stop reason: HOSPADM

## 2023-06-16 RX ORDER — ENOXAPARIN SODIUM 100 MG/ML
40 INJECTION SUBCUTANEOUS DAILY
Status: DISCONTINUED | OUTPATIENT
Start: 2023-06-16 | End: 2023-06-17 | Stop reason: HOSPADM

## 2023-06-16 RX ORDER — SODIUM CHLORIDE, SODIUM LACTATE, POTASSIUM CHLORIDE, CALCIUM CHLORIDE 600; 310; 30; 20 MG/100ML; MG/100ML; MG/100ML; MG/100ML
125 INJECTION, SOLUTION INTRAVENOUS CONTINUOUS
Status: DISCONTINUED | OUTPATIENT
Start: 2023-06-16 | End: 2023-06-17

## 2023-06-16 RX ORDER — MONTELUKAST SODIUM 10 MG/1
10 TABLET ORAL DAILY
Status: DISCONTINUED | OUTPATIENT
Start: 2023-06-17 | End: 2023-06-17 | Stop reason: HOSPADM

## 2023-06-16 RX ORDER — CLOTRIMAZOLE AND BETAMETHASONE DIPROPIONATE 10; .64 MG/G; MG/G
CREAM TOPICAL 2 TIMES DAILY PRN
Status: DISCONTINUED | OUTPATIENT
Start: 2023-06-16 | End: 2023-06-17 | Stop reason: HOSPADM

## 2023-06-16 RX ORDER — CEFTRIAXONE 1 G/50ML
1000 INJECTION, SOLUTION INTRAVENOUS EVERY 24 HOURS
Status: DISCONTINUED | OUTPATIENT
Start: 2023-06-16 | End: 2023-06-17 | Stop reason: HOSPADM

## 2023-06-16 RX ADMIN — ACETAMINOPHEN 325MG 975 MG: 325 TABLET ORAL at 19:25

## 2023-06-16 RX ADMIN — CEFTRIAXONE 1000 MG: 1 INJECTION, SOLUTION INTRAVENOUS at 17:43

## 2023-06-16 NOTE — ASSESSMENT & PLAN NOTE
Previous outpatient management with Keflex, but dysuria symptoms worsened with subjective fever at home  Right flank pain & CVA tenderness on admission    Afebrile  UA 6/15 largely normal, urine cx pending  CBC/CMP wnl  GC/CT and Affirm pending  Renal US normal  · Continue Ceftriaxone 1000 mg q24h until 24 hr then transition to PO Keflex for 14 days followed by suppression therapy for duration of pregnancy  · Plan for discharge home today after pm IV antibiotics

## 2023-06-16 NOTE — ASSESSMENT & PLAN NOTE
Currently on Lovenox 40 mg daily for prophylaxis    Negative thrombophilia workup in past   · Continue Lovenox

## 2023-06-16 NOTE — H&P
Angela [age] y o  female MRN: [id number]  Unit/Bed#: L&D 324-01 Encounter: [id number]    Assessment: 43 y o  Aubree Hardy at 23w5d admitted for management of suspected pyelonephritis   SVE: pending night team   FHT: reactive   GBS status: Fany Powell present on 2023      Plan:   * UTI (urinary tract infection) affecting pregnancy   Assessment & Plan  Previous outpatient management with Keflex but dysuria symptoms worsening and positive CVA tenderness    Plan :   Admit   CBC and CMP   F/U Pending Urine Culture from 06/15/23  Ceftriaxone 1000 mg Q24   Renal US   labor workup pending         Group B streptococcal bacteriuria  Assessment & Plan  + GBS in urine @ 14 weeks: Will need to treat in labor    Obesity affecting pregnancy  Assessment & Plan  BMI 34    Asthma  Assessment & Plan  Albuterol PRN    Interstitial cystitis  Assessment & Plan  Asymptomatic for the past year    History of pulmonary embolism  Assessment & Plan  Currently on Peuysph52 daily  ppx  Negative thrombophilia workup     Mild intermittent asthma without complication  Assessment & Plan  PRN Albuterol     Chronic hypertension affecting pregnancy  Assessment & Plan  Systolic (38MQF), VVL:113 , Min:124 , HLU:237    Diastolic (82UWO), MRY:89, Min:66, Max:81     Previously on Lisinopril and Metoprolol   Currently Not on any medications    P:C pending           Discussed case and plan w/ Dr Gilberto Craig       Chief Complaint: Dysuria and back pain     HPI: Jay Emjoann Murphy is a 43 y o  Aubree Hardy with an ALIZE of 10/8/2023, by Ultrasound at 23w5d who is being admitted for observation and management of suspected pyelonephritis   She reports that she was previously diagnosed with UTI and completed a coarse of Keflex yesterday but has recently had worsening symptoms  She reports significantly increased urinary frequency, burning upon urination and elevated temperature   She also reports mild lower abdominal pain and right sided back pain  She states that she has a history of interstitial cystisis but has not had a recent flare up  She reports increased nausea but denies vomiting  She denies a history of kidney stones in herself and her family  She denies hematuria  She denies contractions, vaginal bleeding and leakage of fluid  She reports abnormal vaginal discharge that is white in color  Patient Active Problem List   Diagnosis   • Chronic hypertension affecting pregnancy   • Mild intermittent asthma without complication   • PCOS (polycystic ovarian syndrome)   • History of pulmonary embolism   • Family history of breast cancer   • Mass of lower inner quadrant of left breast   • Migraine   • Supervision of high-risk pregnancy   • 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   • 23 weeks gestation of pregnancy   • Interstitial cystitis   • GERD (gastroesophageal reflux disease)   • Depression affecting pregnancy   • Asthma   • Medication exposure during first trimester of pregnancy   • Obesity affecting pregnancy   • Group B streptococcal bacteriuria   • Primigravida of advanced maternal age in second trimester   • UTI (urinary tract infection) affecting pregnancy        Baby complications/comments: none    Review of Systems   Constitutional: Positive for fever (elevated temp without fever)  Negative for chills  Eyes: Negative for photophobia and visual disturbance  Respiratory: Negative for cough  Cardiovascular: Negative for chest pain and leg swelling  Gastrointestinal: Negative for abdominal pain, diarrhea, nausea and vomiting  Genitourinary: Negative for dysuria, vaginal bleeding and vaginal discharge  Neurological: Negative for dizziness and headaches         OB Hx:  OB History    Para Term  AB Living   1 0 0 0 0 0   SAB IAB Ectopic Multiple Live Births   0 0 0 0 0      # Outcome Date GA Lbr Ed/2nd Weight Sex Delivery Anes PTL Lv   1 Current                Past Medical Hx:  Past Medical History:   Diagnosis Date   • Asthma    • Depression    • GERD (gastroesophageal reflux disease)    • Hypertension    • IC (interstitial cystitis)    • Migraines    • PCOS (polycystic ovarian syndrome)    • Pulmonary embolism ([facility] [address] ) 2009   • Venous insufficiency        Past Surgical hx:  Past Surgical History:   Procedure Laterality Date   • ADENOIDECTOMY     • CHOLECYSTECTOMY     • LAPAROSCOPY     • TONSILLECTOMY         Social Hx:  Alcohol use: no  Tobacco use: no  Other substance use: none    Other: no    Allergies   Allergen Reactions   • Erythromycin Swelling   • Ethylenediamine Dermatitis, Hives, Itching and Swelling   • Propolis Dermatitis, Hives, Itching, Swelling and Rash   • Sumatriptan Chest Pain and Palpitations     to all TRIPTANS   • Triptans Palpitations     flushing       Medications Prior to Admission   Medication   • acetaZOLAMIDE (DIAMOX) 125 mg tablet   • albuterol (Ventolin HFA) 90 mcg/act inhaler   • aspirin (ECOTRIN LOW STRENGTH) 81 mg EC tablet   • clotrimazole-betamethasone (LOTRISONE) 1-0 05 % cream   • Doxylamine Succinate, Sleep, (UNISOM PO)   • enoxaparin (LOVENOX) 40 mg/0 4 mL   • fluticasone (FLONASE) 50 mcg/act nasal spray   • loratadine (CLARITIN) 10 mg tablet   • MAGNESIUM CITRATE PO   • metoclopramide (Reglan) 10 mg tablet   • montelukast (SINGULAIR) 10 mg tablet   • Prenatal Vit-Fe Fumarate-FA (PRENATAL VITAMINS PO)   • Pyridoxine HCl (VITAMIN B6 PO)   • Riboflavin (B2) 100 MG TABS       Objective:  Temp:  [98 °F (36 7 °C)-98 7 °F (37 1 °C)] 98 °F (36 7 °C)  HR:  [] 100  Resp:  [17] 17  BP: (124-152)/(66-81) 139/74  There is no height or weight on file to calculate BMI  Physical Exam:  Chaperone present   Physical Exam  Constitutional:       Appearance: She is obese  Genitourinary:      Vulva normal    HENT:      Head: Normocephalic and atraumatic  Eyes:      Extraocular Movements: Extraocular movements intact  Cardiovascular:      Rate and Rhythm: Normal rate and regular rhythm        Heart sounds: Normal heart sounds  No murmur heard  No friction rub  No gallop  Pulmonary:      Effort: Pulmonary effort is normal  No respiratory distress  Breath sounds: No wheezing or rhonchi  Abdominal:      General: There is no distension  Palpations: Abdomen is soft  Tenderness: There is abdominal tenderness (mild suprapubic tenderness)  There is no guarding  Musculoskeletal:         General: Tenderness (CVA tenderness) present  No swelling  Normal range of motion  Cervical back: Normal range of motion  Neurological:      Mental Status: She is alert  Mental status is at baseline  Skin:     General: Skin is warm  Findings: No rash        Comments: ecchymosis on abdomen from lovenox injections     Psychiatric:         Mood and Affect: Mood normal             FHT:Reactive Baseline 140bpm, 10X10 accelerations, no decelerations  Baseline Rate: 135 bpm  Variability: Moderate 6-25 bpm  Accelerations: 10 x 10 (<32 weeks)  Decelerations: None    TOCO:   Contraction Frequency (minutes): n/a  Contraction Duration (seconds): n/a  Contraction Quality: Not applicableno contractions    Lab Results   Component Value Date    WBC 12 28 (H) 06/16/2023    HGB 11 6 06/16/2023    HCT 34 8 06/16/2023     06/16/2023     Lab Results   Component Value Date    K 3 4 (L) 06/16/2023     06/16/2023    CO2 22 06/16/2023    BUN 8 06/16/2023    CREATININE 0 55 (L) 06/16/2023    AST 12 (L) 06/16/2023    ALT 16 06/16/2023     Prenatal Labs: Reviewed      Blood type: A positive   Antibody: negative   GBS: bacteria , positive   HIV: nonreactive   Rubella: Immune  VDRL/RPR: Non reactive  HBsAg: Negative  Chlamydia: Negative  Gonorrhea: Negative  Diabetes 1 hour screen: 117/129  3 hour glucose: NA  Platelets: 692  Hgb: 13 3  <2 Midnights  OBSERVATION    Signature/Title: Nunu Cooper MD  Date: 6/16/2023  Time: 6:24 PM

## 2023-06-16 NOTE — ASSESSMENT & PLAN NOTE
Systolic (96HLU), KARLEE:077 , Min:105 , SUT:716    Diastolic (52RCA), XCU:48, Min:52, Max:81     Previously on Lisinopril and Metoprolol; currently not on any medications  Asymptomatic  UP:C 0 08, CBC/CMP wnl      · Monitor BP  · Monitor for signs/symptoms of preeclampsia

## 2023-06-16 NOTE — PROGRESS NOTES
Patient is 23w5d pregnant c/o worsening pelvic pressure, urinary burning  Patient says burning when urinating, burning in vagina, and feels like burning in bladder  Back pain for which she is seeing physical therapy is getting worse  Low grade temps of 99, waking up sweaty  Getting up frequently at night to urinate  Not really feeling baby move today  Patient with hx of IC and chronic BV  No flare up of IC for years  In past had bladder instillations with Dr Loya [facility] at [address] in [place]  Saw Venus Phelps in [place] for chronic BV     finished the keflex 3 days ago  frequency improved on meds and no burning  since off meds burning, pelvic pressure, and increased back pressure     creamy white discharge now  6/2/23 was treated with flagyl X 7 days and lotrisone cream for BV    6/2/23 urine culture was + for: [id number] strep group B  Keflex 500mg TID X 7 days was added due to urinary symptoms  6/15/23 UA:  Trace protein, 4-10 RBSs, negative nitrite/leukocyte  6/15/23 urine culture: pending    Problem List        Digestive    GERD (gastroesophageal reflux disease)       Endocrine    PCOS (polycystic ovarian syndrome)       Respiratory    Mild intermittent asthma without complication    Overview     Inhalers PRN         Asthma    Overview     Inhalers PRN            Cardiovascular and Mediastinum    Chronic hypertension affecting pregnancy    Overview     No meds currently   Was previously on lisinopril and metoprolol - after weight loss no metoprolol   Stopped lisinopril January 2023   Baseline labs normal, UPC undetectable   Aspirin recommended         Migraine       Genitourinary    Interstitial cystitis       Other    History of pulmonary embolism    Overview     This was unprovoked in the setting of oral contraceptive use    She had a thorough thrombophilia work-up which can be seen in her lab reports from August 18, 2022 which was negative for inherited or acquired thrombophilia  She is recommended to continue Lovenox prophylaxis throughout her pregnancy and 6 weeks postpartum         Family history of breast cancer    Overview     PGM @ 78         Mass of lower inner quadrant of left breast    Overview      US 3/2023: Previously noted area of probable fat necrosis in the 7 o'clock position of the left breast, 5 cm from the nipple has significantly decreased in size currently measuring 5 x 4 x 7 mm  6 month f/u scheduled 10/4/23         Supervision of high-risk pregnancy    Overview     ASA recommended  Flu vaccine:  completed  Covid vaccine:  completed X 5  NIPS/ MsAFP- negative           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    Overview     Patient is a carrier for CF, alpha thalassemia, [place] - had carrier screening with [facility]  Partner testing:  pending         23 weeks gestation of pregnancy    Depression affecting pregnancy    Overview     Stopped topomax, buspar, prozac with + pregnancy  Behavioral health referral sent         Medication exposure during first trimester of pregnancy    Overview     Stopped topomax with + pregnancy test         Obesity affecting pregnancy    Overview     Pre gestational BMI 30  Early glucola: 3/21 117         Group B streptococcal bacteriuria    Overview     + GBS in urine @ 14 weeks:    Will need to treat in labor         Primigravida of advanced maternal age in second trimester       Vitals:    06/16/23 1300   BP: 136/78   Pulse: 89   Temp: 98 7 °F (37 1 °C)     FHR: 148    External genitalia: no lesions, no discoloration  Urethra: no discharge or erythema  Bladder: nontender, good support  Vagina: white creamy discharge, no lesions  Cervix: no lesions, no cervical motion tenderness  Uterus: NT, normal size and shape  Adnexa: nontender, no masses  + right CVA tenderness  cx post/long/closed    Assessment:  Urinary frequency/ flank pain  23 wks pregnant  Hx of Interstitial cystitis and recurrent BV    Plan:  Awaiting result of urine culture  Affirm/ gonorrhea/ chlamydia sent today  case reviewed with Dr Harsh Reis  pt to L&D for eval with possible labs/ US      Tiger text send to Dr Filipe Segura and charge nurse

## 2023-06-16 NOTE — TELEPHONE ENCOUNTER
Spoke with patient she is having more pressure/ back pain  Urinary burning/ vaginal burning  Patient with hx of IC but has not had flare up in several years  Followed with Abhijit Austin at [address] in [place] for the [facility]  Low grade temp 99  Urinating 8-9 times per night  UA done yesterday : trace protein, 4-10 RBCs  Urine culture still pending      Patient will come in for appt now for higinio

## 2023-06-16 NOTE — TELEPHONE ENCOUNTER
Pt called, stating that she saw her UA came back abnormal, and is asking if you can send a script for ABX today, as it is getting worse and she does not want to go the whole weekend before treating  Advised her the u/c is not back yet  Please advise  Thank you

## 2023-06-17 VITALS
TEMPERATURE: 97.8 F | BODY MASS INDEX: 34.1 KG/M2 | HEIGHT: 72 IN | DIASTOLIC BLOOD PRESSURE: 69 MMHG | SYSTOLIC BLOOD PRESSURE: 121 MMHG | WEIGHT: 251.8 LBS | OXYGEN SATURATION: 96 % | RESPIRATION RATE: 18 BRPM | HEART RATE: 86 BPM

## 2023-06-17 PROBLEM — B96.89 BV (BACTERIAL VAGINOSIS): Status: ACTIVE | Noted: 2023-06-17

## 2023-06-17 PROBLEM — N76.0 BV (BACTERIAL VAGINOSIS): Status: ACTIVE | Noted: 2023-06-17

## 2023-06-17 LAB
BACTERIA UR CULT: NORMAL
CREAT UR-MCNC: 60.4 MG/DL
PROT UR-MCNC: 5 MG/DL
PROT/CREAT UR: 0.08 MG/G{CREAT} (ref 0–0.1)

## 2023-06-17 PROCEDURE — 99204 OFFICE O/P NEW MOD 45 MIN: CPT

## 2023-06-17 PROCEDURE — NC001 PR NO CHARGE: Performed by: OBSTETRICS & GYNECOLOGY

## 2023-06-17 PROCEDURE — 76817 TRANSVAGINAL US OBSTETRIC: CPT

## 2023-06-17 PROCEDURE — G0463 HOSPITAL OUTPT CLINIC VISIT: HCPCS

## 2023-06-17 PROCEDURE — 84156 ASSAY OF PROTEIN URINE: CPT

## 2023-06-17 PROCEDURE — 99238 HOSP IP/OBS DSCHRG MGMT 30/<: CPT | Performed by: OBSTETRICS & GYNECOLOGY

## 2023-06-17 PROCEDURE — 82570 ASSAY OF URINE CREATININE: CPT

## 2023-06-17 RX ORDER — SODIUM CHLORIDE 9 MG/ML
125 INJECTION, SOLUTION INTRAVENOUS CONTINUOUS
Status: DISCONTINUED | OUTPATIENT
Start: 2023-06-17 | End: 2023-06-17 | Stop reason: HOSPADM

## 2023-06-17 RX ORDER — METRONIDAZOLE 500 MG/1
500 TABLET ORAL EVERY 12 HOURS SCHEDULED
Qty: 12 TABLET | Refills: 0 | Status: SHIPPED | OUTPATIENT
Start: 2023-06-17 | End: 2023-06-23

## 2023-06-17 RX ORDER — CEPHALEXIN 500 MG/1
500 CAPSULE ORAL EVERY 6 HOURS SCHEDULED
Qty: 48 CAPSULE | Refills: 0 | Status: SHIPPED | OUTPATIENT
Start: 2023-06-17 | End: 2023-06-29

## 2023-06-17 RX ORDER — METRONIDAZOLE 500 MG/100ML
500 INJECTION, SOLUTION INTRAVENOUS 2 TIMES DAILY
Status: DISCONTINUED | OUTPATIENT
Start: 2023-06-17 | End: 2023-06-17 | Stop reason: HOSPADM

## 2023-06-17 RX ORDER — CEPHALEXIN 500 MG/1
500 CAPSULE ORAL EVERY 24 HOURS
Qty: 90 CAPSULE | Refills: 0 | Status: SHIPPED | OUTPATIENT
Start: 2023-06-17 | End: 2023-06-30 | Stop reason: SDUPTHER

## 2023-06-17 RX ORDER — ACETAMINOPHEN 325 MG/1
650 TABLET ORAL EVERY 6 HOURS PRN
Refills: 0
Start: 2023-06-17

## 2023-06-17 RX ORDER — METOCLOPRAMIDE 10 MG/1
10 TABLET ORAL EVERY 6 HOURS PRN
Qty: 30 TABLET | Refills: 0 | Status: SHIPPED | OUTPATIENT
Start: 2023-06-17

## 2023-06-17 RX ORDER — METOCLOPRAMIDE 10 MG/1
10 TABLET ORAL ONCE
Status: COMPLETED | OUTPATIENT
Start: 2023-06-17 | End: 2023-06-17

## 2023-06-17 RX ADMIN — METRONIDAZOLE 500 MG: 500 INJECTION, SOLUTION INTRAVENOUS at 02:19

## 2023-06-17 RX ADMIN — METRONIDAZOLE 500 MG: 500 INJECTION, SOLUTION INTRAVENOUS at 08:54

## 2023-06-17 RX ADMIN — ENOXAPARIN SODIUM 40 MG: 40 INJECTION SUBCUTANEOUS at 08:52

## 2023-06-17 RX ADMIN — SODIUM CHLORIDE 125 ML/HR: 0.9 INJECTION, SOLUTION INTRAVENOUS at 07:00

## 2023-06-17 RX ADMIN — PRENATAL VIT W/ FE FUMARATE-FA TAB 27-0.8 MG 1 TABLET: 27-0.8 TAB at 08:52

## 2023-06-17 RX ADMIN — METOCLOPRAMIDE 10 MG: 10 TABLET ORAL at 11:50

## 2023-06-17 RX ADMIN — CEFTRIAXONE 1000 MG: 1 INJECTION, SOLUTION INTRAVENOUS at 16:15

## 2023-06-17 RX ADMIN — FLUTICASONE PROPIONATE 1 SPRAY: 50 SPRAY, METERED NASAL at 11:16

## 2023-06-17 RX ADMIN — LORATADINE 10 MG: 10 TABLET ORAL at 08:52

## 2023-06-17 RX ADMIN — MONTELUKAST 10 MG: 10 TABLET, FILM COATED ORAL at 08:52

## 2023-06-17 RX ADMIN — ACETAMINOPHEN 325MG 975 MG: 325 TABLET ORAL at 08:52

## 2023-06-17 NOTE — PROCEDURES
Flakito Mike, a  at 23w6d with an ALIZE of 10/8/2023, by Ultrasound, was seen at [address] [facility] for the following procedure(s): $Procedure Type: US - Transvaginal]                   Ultrasound Other  Fetal Presentation: Vertex  Cervical Length: 4 07  Funnel: No  Debris: No  Placenta Previa: No  Vasa Previa: No         Ultrasound Probe Disinfection    A transvaginal ultrasound was performed     Prior to use, disinfection was performed with High Level Disinfection Process (Trophon)      Patient seen and examined at bedside   labor workup negative  Wet mount significant for abundant clue cells  Amsel's criteria (3/4) +clue cells, +nitrazine, +increased vaginal discharge  Plan for IV Flagyl in the setting of recurrent bacterial vaginosis with transition to PO on discharge  Continue IV Rocephin for management of pyelonephritis    D/w Dr Alverto Estevez MD  23  12:47 AM

## 2023-06-17 NOTE — PLAN OF CARE
Problem: PAIN - ADULT  Goal: Verbalizes/displays adequate comfort level or baseline comfort level  Description: Interventions:  - Encourage patient to monitor pain and request assistance  - Assess pain using appropriate pain scale  - Administer analgesics based on type and severity of pain and evaluate response  - Implement non-pharmacological measures as appropriate and evaluate response  - Consider cultural and social influences on pain and pain management  - Notify physician/advanced practitioner if interventions unsuccessful or patient reports new pain  Outcome: Progressing     Problem: INFECTION - ADULT  Goal: Absence or prevention of progression during hospitalization  Description: INTERVENTIONS:  - Assess and monitor for signs and symptoms of infection  - Monitor lab/diagnostic results  - Monitor all insertion sites, i e  indwelling lines, tubes, and drains  - Monitor endotracheal if appropriate and nasal secretions for changes in amount and color  - [place] appropriate cooling/warming therapies per order  - Administer medications as ordered  - Instruct and encourage patient and family to use good hand hygiene technique  - Identify and instruct in appropriate isolation precautions for identified infection/condition  Outcome: Progressing  Goal: Absence of fever/infection during neutropenic period  Description: INTERVENTIONS:  - Monitor WBC    Outcome: Progressing     Problem: SAFETY ADULT  Goal: Patient will remain free of falls  Description: INTERVENTIONS:  - Educate patient/family on patient safety including physical limitations  - Instruct patient to call for assistance with activity   - Consult OT/PT to assist with strengthening/mobility   - Keep Call bell within reach  - Keep bed low and locked with side rails adjusted as appropriate  - Keep care items and personal belongings within reach  - Initiate and maintain comfort rounds  - Make Fall Risk Sign visible to staff  - Offer Toileting every  Hours, in advance of need  - Initiate/Maintain alarm  - Obtain necessary fall risk management equipment:   - Apply yellow socks and bracelet for high fall risk patients  - Consider moving patient to room near nurses station  Outcome: Progressing  Goal: Maintain or return to baseline ADL function  Description: INTERVENTIONS:  -  Assess patient's ability to carry out ADLs; assess patient's baseline for ADL function and identify physical deficits which impact ability to perform ADLs (bathing, care of mouth/teeth, toileting, grooming, dressing, etc )  - Assess/evaluate cause of self-care deficits   - Assess range of motion  - Assess patient's mobility; develop plan if impaired  - Assess patient's need for assistive devices and provide as appropriate  - Encourage maximum independence but intervene and supervise when necessary  - Involve family in performance of ADLs  - Assess for home care needs following discharge   - Consider OT consult to assist with ADL evaluation and planning for discharge  - Provide patient education as appropriate  Outcome: Progressing  Goal: Maintains/Returns to pre admission functional level  Description: INTERVENTIONS:  - Perform BMAT or MOVE assessment daily    - Set and communicate daily mobility goal to care team and patient/family/caregiver  - Collaborate with rehabilitation services on mobility goals if consulted  - Perform Range of Motion  times a day  - Reposition patient every  hours    - Dangle patient  times a day  - Stand patient  times a day  - Ambulate patient  times a day  - Out of bed to chair  times a day   - Out of bed for meals times a day  - Out of bed for toileting  - Record patient progress and toleration of activity level   Outcome: Progressing     Problem: DISCHARGE PLANNING  Goal: Discharge to home or other facility with appropriate resources  Description: INTERVENTIONS:  - Identify barriers to discharge w/patient and caregiver  - Arrange for needed discharge resources and transportation as appropriate  - Identify discharge learning needs (meds, wound care, etc )  - Arrange for interpretive services to assist at discharge as needed  - Refer to Case Management Department for coordinating discharge planning if the patient needs post-hospital services based on physician/advanced practitioner order or complex needs related to functional status, cognitive ability, or social support system  Outcome: Progressing     Problem: Knowledge Deficit  Goal: Patient/family/caregiver demonstrates understanding of disease process, treatment plan, medications, and discharge instructions  Description: Complete learning assessment and assess knowledge base    Interventions:  - Provide teaching at level of understanding  - Provide teaching via preferred learning methods  Outcome: Progressing     Problem: ANTEPARTUM  Goal: Maintain pregnancy as long as maternal and/or fetal condition is stable  Description: INTERVENTIONS:  - Maternal surveillance  - Fetal surveillance  - Monitor uterine activity  - Medications as ordered  - Bedrest  Outcome: Progressing

## 2023-06-17 NOTE — PLAN OF CARE
Problem: PAIN - ADULT  Goal: Verbalizes/displays adequate comfort level or baseline comfort level  Description: Interventions:  - Encourage patient to monitor pain and request assistance  - Assess pain using appropriate pain scale  - Administer analgesics based on type and severity of pain and evaluate response  - Implement non-pharmacological measures as appropriate and evaluate response  - Consider cultural and social influences on pain and pain management  - Notify physician/advanced practitioner if interventions unsuccessful or patient reports new pain  6/17/2023 1642 by Darian Crowe RN  Outcome: Adequate for Discharge  6/17/2023 0925 by Darian Crowe RN  Outcome: Progressing     Problem: INFECTION - ADULT  Goal: Absence or prevention of progression during hospitalization  Description: INTERVENTIONS:  - Assess and monitor for signs and symptoms of infection  - Monitor lab/diagnostic results  - Monitor all insertion sites, i e  indwelling lines, tubes, and drains  - Monitor endotracheal if appropriate and nasal secretions for changes in amount and color  - [place] appropriate cooling/warming therapies per order  - Administer medications as ordered  - Instruct and encourage patient and family to use good hand hygiene technique  - Identify and instruct in appropriate isolation precautions for identified infection/condition  6/17/2023 1642 by Darian Crowe RN  Outcome: Adequate for Discharge  6/17/2023 0925 by Darian Crowe RN  Outcome: Progressing  Goal: Absence of fever/infection during neutropenic period  Description: INTERVENTIONS:  - Monitor WBC    6/17/2023 1642 by Darian Crowe RN  Outcome: Adequate for Discharge  6/17/2023 0925 by Darian Crowe RN  Outcome: Progressing     Problem: SAFETY ADULT  Goal: Patient will remain free of falls  Description: INTERVENTIONS:  - Keep Call bell within reach  - Keep bed low and locked with side rails adjusted as appropriate  - Keep care items and personal belongings within reach  - Initiate and maintain comfort rounds    6/17/2023 1642 by Bola Clemente RN  Outcome: Adequate for Discharge  6/17/2023 0925 by Bola Clemente RN  Outcome: Progressing  Goal: Maintain or return to baseline ADL function  Description: INTERVENTIONS:  - Provide patient education as appropriate  6/17/2023 1642 by Bola Clemente RN  Outcome: Adequate for Discharge  6/17/2023 0925 by Bola Clemente RN  Outcome: Progressing  Goal: Maintains/Returns to pre admission functional level  Description: INTERVENTIONS:  - Record patient progress and toleration of activity level   6/17/2023 1642 by Bola Clemente RN  Outcome: Adequate for Discharge  6/17/2023 0925 by Bola Clemente RN  Outcome: Progressing     Problem: DISCHARGE PLANNING  Goal: Discharge to home or other facility with appropriate resources  Description: INTERVENTIONS:  - Identify barriers to discharge w/patient and caregiver  - Arrange for needed discharge resources and transportation as appropriate  - Identify discharge learning needs (meds, wound care, etc )  - Arrange for interpretive services to assist at discharge as needed  - Refer to Case Management Department for coordinating discharge planning if the patient needs post-hospital services based on physician/advanced practitioner order or complex needs related to functional status, cognitive ability, or social support system  6/17/2023 1642 by Bola Clemente RN  Outcome: Adequate for Discharge  6/17/2023 0925 by Bola Clemente RN  Outcome: Progressing     Problem: Knowledge Deficit  Goal: Patient/family/caregiver demonstrates understanding of disease process, treatment plan, medications, and discharge instructions  Description: Complete learning assessment and assess knowledge base    Interventions:  - Provide teaching at level of understanding  - Provide teaching via preferred learning methods  6/17/2023 1642 by Bola Clemente RN  Outcome: Adequate for Discharge  6/17/2023 4567 by Rosemary Pringle RN  Outcome: Progressing     Problem: ANTEPARTUM  Goal: Maintain pregnancy as long as maternal and/or fetal condition is stable  Description: INTERVENTIONS:  - Maternal surveillance  - Fetal surveillance  - Monitor uterine activity  - Medications as ordered  - Bedrest  6/17/2023 1642 by Rosemary Pringle RN  Outcome: Adequate for Discharge  6/17/2023 0925 by Rosemary Pringle RN  Outcome: Progressing

## 2023-06-17 NOTE — ASSESSMENT & PLAN NOTE
bpm   No obstetric complaints  SVE 0/0/-4 on admission    · Continue routine obstetric care  · FHT qshift

## 2023-06-17 NOTE — PLAN OF CARE
Problem: PAIN - ADULT  Goal: Verbalizes/displays adequate comfort level or baseline comfort level  Description: Interventions:  - Encourage patient to monitor pain and request assistance  - Assess pain using appropriate pain scale  - Administer analgesics based on type and severity of pain and evaluate response  - Implement non-pharmacological measures as appropriate and evaluate response  - Consider cultural and social influences on pain and pain management  - Notify physician/advanced practitioner if interventions unsuccessful or patient reports new pain  Outcome: Progressing     Problem: INFECTION - ADULT  Goal: Absence or prevention of progression during hospitalization  Description: INTERVENTIONS:  - Assess and monitor for signs and symptoms of infection  - Monitor lab/diagnostic results  - Monitor all insertion sites, i e  indwelling lines, tubes, and drains  - Monitor endotracheal if appropriate and nasal secretions for changes in amount and color  - [place] appropriate cooling/warming therapies per order  - Administer medications as ordered  - Instruct and encourage patient and family to use good hand hygiene technique  - Identify and instruct in appropriate isolation precautions for identified infection/condition  Outcome: Progressing  Goal: Absence of fever/infection during neutropenic period  Description: INTERVENTIONS:  - Monitor WBC    Outcome: Progressing     Problem: SAFETY ADULT  Goal: Patient will remain free of falls  Description: INTERVENTIONS:  - Keep Call bell within reach  - Keep bed low and locked with side rails adjusted as appropriate  - Keep care items and personal belongings within reach  - Initiate and maintain comfort rounds    Outcome: Progressing  Goal: Maintain or return to baseline ADL function  Description: INTERVENTIONS:  - Provide patient education as appropriate  Outcome: Progressing  Goal: Maintains/Returns to pre admission functional level  Description: INTERVENTIONS:  - Record patient progress and toleration of activity level   Outcome: Progressing     Problem: DISCHARGE PLANNING  Goal: Discharge to home or other facility with appropriate resources  Description: INTERVENTIONS:  - Identify barriers to discharge w/patient and caregiver  - Arrange for needed discharge resources and transportation as appropriate  - Identify discharge learning needs (meds, wound care, etc )  - Arrange for interpretive services to assist at discharge as needed  - Refer to Case Management Department for coordinating discharge planning if the patient needs post-hospital services based on physician/advanced practitioner order or complex needs related to functional status, cognitive ability, or social support system  Outcome: Progressing     Problem: Knowledge Deficit  Goal: Patient/family/caregiver demonstrates understanding of disease process, treatment plan, medications, and discharge instructions  Description: Complete learning assessment and assess knowledge base    Interventions:  - Provide teaching at level of understanding  - Provide teaching via preferred learning methods  Outcome: Progressing     Problem: ANTEPARTUM  Goal: Maintain pregnancy as long as maternal and/or fetal condition is stable  Description: INTERVENTIONS:  - Maternal surveillance  - Fetal surveillance  - Monitor uterine activity  - Medications as ordered  - Bedrest  Outcome: Progressing

## 2023-06-17 NOTE — PROGRESS NOTES
OBGYN Progress Note - Antepartum  Flakito Mike 43 y o  female MRN: [id number]  Unit/Bed#: L&D 324-01 Encounter: [id number]    Assessment/Plan:  43 y o   at 24w9d HD#2 admitted with pyelonephritis, clinically improving with plan for likely discharge on oral antibiotics this evening  * UTI (urinary tract infection) affecting pregnancy   Assessment & Plan  Previous outpatient management with Keflex, but dysuria symptoms worsened with subjective fever at home  Right flank pain & CVA tenderness on admission  Afebrile  UA /15 largely normal, urine cx pending  CBC/CMP wnl  GC/CT and Affirm pending  Renal US normal  · Continue Ceftriaxone 1000 mg q24h until 24 hr then transition to PO Keflex for 14 days followed by suppression therapy for duration of pregnancy  · Plan for discharge home today after pm IV antibiotics    BV (bacterial vaginosis)  Assessment & Plan  Chronic BV this pregnancy  Treated numerous times with PO Flagyl  3/4 Amsel criteria on admission with abundant clue cells on microscopy  · Continue IV Flagyl then transition to oral after 24 hr with plan for 7 days of treatment    Group B streptococcal bacteriuria  Assessment & Plan  + GBS in urine at 14w  · Will need penicillin for prophylaxis in labor    Obesity affecting pregnancy  Assessment & Plan  BMI 34  Asthma  Assessment & Plan  Albuterol PRN    Interstitial cystitis  Assessment & Plan  Asymptomatic for the past year  23 weeks gestation of pregnancy  Assessment & Plan   bpm   No obstetric complaints  SVE 0/0/-4 on admission  · Continue routine obstetric care  · FHT qshift    History of pulmonary embolism  Assessment & Plan  Currently on Lovenox 40 mg daily for prophylaxis    Negative thrombophilia workup in past   · Continue Lovenox    Mild intermittent asthma without complication  Assessment & Plan  · Albuterol prn    Chronic hypertension affecting pregnancy  Assessment & Plan  Systolic (54ZKY), IHY:269 , Min:105 , DGU:484    Diastolic (94IRB), YRA:81, Min:52, Max:81     Previously on Lisinopril and Metoprolol; currently not on any medications  Asymptomatic  UP:C 0 08, CBC/CMP wnl  · Monitor BP  · Monitor for signs/symptoms of preeclampsia    Subjective:   Carrie Meredith reports improved pain since admission yesterday  She does still feel intermittent right flank pain but notes it's better than it was before  She denies vaginal bleeding, leaking fluid, decreased fetal movement, cramping/contractions, nausea, vomiting, or other concerns  She is amenable with plan for likely discharge tonight on oral antibiotics  Objective:   Vitals:   Temp:  [98 °F (36 7 °C)-98 7 °F (37 1 °C)] 98 7 °F (37 1 °C)  HR:  [] 93  Resp:  [17-18] 18  BP: (105-152)/(52-81) 133/81     Physical Exam  Constitutional:       General: She is not in acute distress  Appearance: Normal appearance  She is not ill-appearing  HENT:      Mouth/Throat:      Mouth: Mucous membranes are moist    Eyes:      Extraocular Movements: Extraocular movements intact  Cardiovascular:      Rate and Rhythm: Normal rate and regular rhythm  Heart sounds: Normal heart sounds  Pulmonary:      Effort: Pulmonary effort is normal       Breath sounds: Normal breath sounds  Abdominal:      General: There is no distension  Palpations: Abdomen is soft  Tenderness: There is no abdominal tenderness  There is right CVA tenderness  There is no left CVA tenderness or guarding  Musculoskeletal:         General: Tenderness (tenderness to palpation of right paraspinal muscles improving w/ heat pad) present  No swelling  Right lower leg: No edema  Left lower leg: No edema  Skin:     General: Skin is warm and dry  Coloration: Skin is not jaundiced or pale  Neurological:      General: No focal deficit present  Mental Status: She is alert     Psychiatric:         Mood and Affect: Mood normal          Behavior: Behavior normal          Thought Content: Thought content normal          Judgment: Judgment normal          Fetal Assessment:   bpm    Lab, Imaging and other studies: I have personally reviewed pertinent reports        Lab Results   Component Value Date    WBC 12 28 (H) 06/16/2023    HGB 11 6 06/16/2023    HCT 34 8 06/16/2023    MCV 86 06/16/2023     06/16/2023       Lab Results   Component Value Date    CALCIUM 9 1 06/16/2023    K 3 4 (L) 06/16/2023    CO2 22 06/16/2023     06/16/2023    BUN 8 06/16/2023    CREATININE 0 55 (L) 06/16/2023       Lab Results   Component Value Date    ALT 16 06/16/2023    AST 12 (L) 06/16/2023    ALKPHOS 31 (L) 06/16/2023     Imaging:  Renal ultrasound with normal findings      Ravi Fuentes MD  OBGYN Resident  06/17/23  9:53 AM

## 2023-06-17 NOTE — UTILIZATION REVIEW
Initial Clinical Review    Admission: Date/Time/Statement:   Admission Orders (From admission, onward)     Ordered        23 1732  Place in Observation  Once                      Orders Placed This Encounter   Procedures   • Place in Observation     Standing Status:   Standing     Number of Occurrences:   1     Order Specific Question:   Level of Care     Answer:   Med Surg [16]     ED Arrival Information     Patient not seen in ED                     Chief Complaint   Patient presents with   • Abdominal Pain     Lower flank pain  Pt said she is having some slight discharge and some pain not sure if related to BV        Initial Presentation: 43 y o  female from [address] [place] [address] office and admitted observation d/t   Assessment: 43 y o  Winston Parker at 23w5d admitted for management of suspected pyelonephritis  SVE: pending night team   FHT: reactive   GBS status: Bacteuria present on 2023        Plan:    UTI (urinary tract infection) affecting pregnancy   Assessment & Plan  Previous outpatient management with Keflex but dysuria symptoms worsening and positive CVA tenderness     Plan :   Admit   CBC and CMP   F/U Pending Urine Culture from 06/15/23  Ceftriaxone 1000 mg Q24   Renal US   labor workup pending            Group B streptococcal bacteriuria  Assessment & Plan  + GBS in urine @ 14 weeks:    Will need to treat in labor     Obesity affecting pregnancy  Assessment & Plan  BMI 34     Asthma  Assessment & Plan  Albuterol PRN     Interstitial cystitis  Assessment & Plan  Asymptomatic for the past year     History of pulmonary embolism  Assessment & Plan  Currently on Sizrlhh84 daily  ppx  Negative thrombophilia workup      Mild intermittent asthma without complication  Assessment & Plan  PRN Albuterol      Chronic hypertension affecting pregnancy  Assessment & Plan  Systolic (04PZE), JLD:412 , Min:124 , XAL:181    Diastolic (61HRX), MJW:30, Min:66, Max:81     Previously on Lisinopril and Metoprolol Currently Not on any medications     P:C pending             ED Triage Vitals   Temperature Pulse Respirations Blood Pressure SpO2   06/16/23 1556 06/16/23 1556 06/16/23 1556 06/16/23 1556 06/16/23 1721   98 °F (36 7 °C) 100 17 138/69 97 %      Temp Source Heart Rate Source Patient Position - Orthostatic VS BP Location FiO2 (%)   06/16/23 1556 06/16/23 1556 06/16/23 1556 06/16/23 1556 --   Oral Monitor Lying Right arm       Pain Score       06/16/23 1556       8          Wt Readings from Last 1 Encounters:   06/16/23 114 kg (251 lb 12 8 oz)     Additional Vital Signs:   06/17/23 0840 98 7 °F (37 1 °C) 93 -- 133/81 -- -- --   06/17/23 0254 98 4 °F (36 9 °C) 88 18 110/52 -- -- Lying   06/16/23 2345 -- -- -- -- -- WDL --   06/16/23 2315 -- 76 -- 105/60 -- -- --   06/16/23 2300 98 4 °F (36 9 °C) -- -- -- -- -- --   06/16/23 1925 98 6 °F (37 °C) -- -- -- -- -- --   06/16/23 1742 -- 100 -- 139/74 94 % -- --   06/16/23 1737 -- 99 -- -- 96 % -- --   06/16/23 1732 -- 101 -- -- 97 % -- --   06/16/23 1727 -- 111 Abnormal  -- 149/70 95 % -- --   06/16/23 1726 -- 108 Abnormal  -- -- -- -- --   06/16/23 1722 -- 101 -- -- 97 % -- --   06/16/23 1721 -- 101 -- -- 97 % -- --   06/16/23 1714 -- 106 Abnormal  -- 152/81 -- -- --   06/16/23 1642 -- 100 -- 132/75 -- -- --   06/16/23 1626 -- 98 -- 124/66 -- -- --   06/16/23 1611 -- 99 -- 134/68 -- -- --   06/16/23 1601 -- -- -- 138/69 -- -- --   06/16/23 1600 -- -- -- 138/69 -- -- --   06/16/23 1556 98 °F (36 7 °C) 100 17 138/69 -- -- Lying       Pertinent Labs/Diagnostic Test Results:   US kidney and bladder   :Normal          Results from last 7 days   Lab Units 06/16/23  1720   WBC Thousand/uL 12 28*   HEMOGLOBIN g/dL 11 6   HEMATOCRIT % 34 8   PLATELETS Thousands/uL 250         Results from last 7 days   Lab Units 06/16/23  1720   SODIUM mmol/L 136   POTASSIUM mmol/L 3 4*   CHLORIDE mmol/L 108   CO2 mmol/L 22   ANION GAP mmol/L 6   BUN mg/dL 8   CREATININE mg/dL 0 55*   EGFR ml/min/1 73sq m 118   CALCIUM mg/dL 9 1     Results from last 7 days   Lab Units 06/16/23  1720   AST U/L 12*   ALT U/L 16   ALK PHOS U/L 31*   TOTAL PROTEIN g/dL 6 3*   ALBUMIN g/dL 3 5   TOTAL BILIRUBIN mg/dL 0 19*         Results from last 7 days   Lab Units 06/16/23  1720   GLUCOSE RANDOM mg/dL 136       Results from last 7 days   Lab Units 06/15/23  1223   CLARITY UA  Turbid   COLOR UA  Yellow   SPEC GRAV UA  1 018   PH UA  6 5   GLUCOSE UA mg/dl Negative   KETONES UA mg/dl Negative   BLOOD UA  Negative   PROTEIN UA mg/dl Trace*   NITRITE UA  Negative   BILIRUBIN UA  Negative   UROBILINOGEN UA (BE) mg/dl <2 0   LEUKOCYTES UA  Negative   WBC UA /hpf 2-4*   RBC UA /hpf 4-10*   BACTERIA UA /hpf Innumerable*   EPITHELIAL CELLS WET PREP /hpf Moderate*   MUCUS THREADS  Occasional*       Results from last 7 days   Lab Units 06/15/23  1223   URINE CULTURE  Culture too young- will reincubate         Past Medical History:   Diagnosis Date   • Asthma    • Depression    • GERD (gastroesophageal reflux disease)    • Hypertension    • IC (interstitial cystitis)    • Migraines    • PCOS (polycystic ovarian syndrome)    • [facility] 2009   • Venous insufficiency      Present on Admission:  • Chronic hypertension affecting pregnancy  • Mild intermittent asthma without complication  • Group B streptococcal bacteriuria  • Interstitial cystitis  • History of pulmonary embolism  • Asthma  • Obesity affecting pregnancy      Admitting Diagnosis: Decreased fetal movement affecting management of pregnancy in second trimester [O36 0685]  Age/Sex: 43 y o  female  Admission Orders:  Scheduled Medications:  B2, 400 mg, Oral, Daily  cefTRIAXone, 1,000 mg, Intravenous, Q24H  enoxaparin, 40 mg, Subcutaneous, Daily  fluticasone, 1 spray, Nasal, Daily  loratadine, 10 mg, Oral, Daily  metroNIDAZOLE, 500 mg, Intravenous, BID  montelukast, 10 mg, Oral, Daily  prenatal multivitamin, 1 tablet, Oral, Daily      Continuous IV Infusions:  sodium chloride, 125 mL/hr, Intravenous, Continuous      PRN Meds:  acetaminophen, 975 mg, Oral, Q6H PRN 6/16 x 1, 6/17 x 1  albuterol, 2 puff, Inhalation, Q4H PRN  clotrimazole-betamethasone, , Topical, BID PRN  ondansetron, 4 mg, Intravenous, Q6H PRN      Reg diet  oob      Network Utilization Review Department  ATTENTION: Please call with any questions or concerns to [phone] and carefully listen to the prompts so that you are directed to the right person  All voicemails are confidential   Ariela Query all requests for admission clinical reviews, approved or denied determinations and any other requests to dedicated fax number below belonging to the campus where the patient is receiving treatment   List of dedicated fax numbers for the Facilities:  [address] DENIALS (Administrative/Medical Necessity) [phone]   [address] (Maternity/NICU/Pediatrics) [phone]   3 Karmen Funez [phone]   [place] [address] [phone]   [address] [place] [address] Hardik Riley [facility] 28 [phone]   [address] [place] Zoe Kelly [facility] 134 815 [facility] [phone]

## 2023-06-17 NOTE — ASSESSMENT & PLAN NOTE
Chronic BV this pregnancy  Treated numerous times with PO Flagyl  3/4 Amsel criteria on admission with abundant clue cells on microscopy    · Continue IV Flagyl then transition to oral after 24 hr with plan for 7 days of treatment

## 2023-06-18 LAB
CANDIDA RRNA VAG QL PROBE: POSITIVE
G VAGINALIS RRNA GENITAL QL PROBE: NEGATIVE
T VAGINALIS RRNA GENITAL QL PROBE: NEGATIVE

## 2023-06-19 ENCOUNTER — TELEPHONE (OUTPATIENT)
Age: 38
End: 2023-06-19

## 2023-06-19 ENCOUNTER — TELEPHONE (OUTPATIENT)
Dept: NEUROLOGY | Facility: CLINIC | Age: 38
End: 2023-06-19

## 2023-06-19 LAB
C TRACH DNA SPEC QL NAA+PROBE: NEGATIVE
N GONORRHOEA DNA SPEC QL NAA+PROBE: NEGATIVE

## 2023-06-19 NOTE — TELEPHONE ENCOUNTER
Spoke with patient reviewed that her vaginal culture was + for yeast   She says she does have itching/ burning  Has been using the lotrisone cream more frequent  Recommend she use 7 day monistat and continue the keflex and flagyl that she was given in the hospital    She has appt on Friday and will f/u at that time  Feeling ok today says she still has some back pain but not as bad as it was

## 2023-06-20 ENCOUNTER — APPOINTMENT (OUTPATIENT)
Dept: PHYSICAL THERAPY | Facility: CLINIC | Age: 38
End: 2023-06-20
Payer: COMMERCIAL

## 2023-06-22 ENCOUNTER — OFFICE VISIT (OUTPATIENT)
Dept: PHYSICAL THERAPY | Facility: CLINIC | Age: 38
End: 2023-06-22
Payer: COMMERCIAL

## 2023-06-22 DIAGNOSIS — M54.50 LOW BACK PAIN DURING PREGNANCY IN SECOND TRIMESTER: Primary | ICD-10-CM

## 2023-06-22 DIAGNOSIS — O26.892 LOW BACK PAIN DURING PREGNANCY IN SECOND TRIMESTER: Primary | ICD-10-CM

## 2023-06-22 PROCEDURE — 97140 MANUAL THERAPY 1/> REGIONS: CPT | Performed by: PHYSICAL THERAPIST

## 2023-06-22 PROCEDURE — 97110 THERAPEUTIC EXERCISES: CPT | Performed by: PHYSICAL THERAPIST

## 2023-06-22 NOTE — PROGRESS NOTES
"Daily Note     Today's date: 2023  Patient name: Елена Singletary  : 1985  MRN: [id number]  Referring provider: Ariana Hayden PA-C  Dx:   Encounter Diagnosis     ICD-10-CM    1  Low back pain during pregnancy in second trimester  O26 892     M54 50                      Subjective: Patient notes that she was admitted to the hospital with a kidney infection/ BV/ UTI/ and yeast infection  She notes while she does feel somewhat better she has pain that persists  She notes right lower quadrat pain that radiates to the left groin and feels it is causing her to limp with ambulation  Objective: See treatment diary below      Assessment: Tolerated treatment well  Patient would benefit from continued PT  Patient responded well to stretching of the B hips and STM to the L/S region  Patient continues with significant daily pain symptoms in the L/s and now in the right lower and left groin pain symptoms  Plan: Continue per plan of care  Precautions: PREGNANCY / anti-coag therapy (lovenox/ baby aspirin)        Daily Treatment Diary:      Initial Evaluation Date: 23  Compliance    Visit Number 11   3  4  5  6  7  8  9  10   Re-Eval  -   -  -  -      Y      Foto Captured -   -  -  -                        Manual                     Stretch IR/ Piriformis 30\" x5   30\" x5  30\"x5 IR/Piriformis/ SKTC/ HS  30\"x5IR/ Piriformis/ SKTC/ HS 15 min  30\" x3 IR/ Piriformis/ SKTC/ HS  30\"x3 IR/ piriformis/ SKTC HS 15 min  30\" x3 IR/ piriformis/ SKTC/ HS 15 min  30\" x3 IR/ piriformis/ SKTC/ HS 15 min  30\" x2 IR/ piriformis/ HS   STM 10 min   10 min  15 min  10 min  10 min  10 min  10 min with addition of IASTM  15 min with IASTM/ tiger tail  10 min   Kinesio tape -        5 min 2 I strips on erector spinea  5 min space correction  5 min 2 I strips with 1 space correction strip  held due to skin  -  -   Ther-Ex  " "                    LTR 5\" x10   5\" x10  5\"x10  5\" x10  5\" x10  5\" x10  5\" x10  5\" x10  5\" x10   TA set with PPT    2 min  5\" x15  5\" x15  5\" x15  5\" x15  5\" x15  5\"x15  5\" x15   TA with march    -            x10  x10   Self IR stretch 10\" x5        10\" x5  10\" x5  10\" x5  10\" x5  10\" x5  10\"x5   Supine clamshell    ----->  --->               Supine add    ---->  --->               Standing hip flexor stretch 20\" x3   --->  ---->               BKFO     x10  x10    -           Cat cow     5\" x5  5\"x10    -           doorwar pectoral stretch 10\"x5         10\" x5   Open books 10\" x5         10\" x5   Seated t/s extension          --->                Lucrecia pose     5\" x5  5\" x10    -  p-ball roll out x2  -       Neuro Re-Ed                                                                                                Ther-Act                                                               Modalities                      HP/ CP HP x10 min HP in left sidely x10 min HP in left slidely x10 min HP x10 min HP x10 min HP x10 min HP x10 min  HP x10 min HP 10 min                                             Access Code: [email or web address]  URL: [email or web address]  Date: 05/01/2023  Prepared by: Jazmyne Arms    Exercises  - Caregiver PROM Hip Internal Rotation  - 1 x daily - 7 x weekly - 1 sets - 5 reps - 20-30 hold  - Self IR stretch  - 1 x daily - 7 x weekly - 1 sets - 10 reps - 10 hold  - Supine Piriformis Stretch with Foot on Ground  - 1 x daily - 7 x weekly - 1 sets - 5 reps - 20-30 hold               "

## 2023-06-23 ENCOUNTER — TELEPHONE (OUTPATIENT)
Dept: OBGYN CLINIC | Facility: CLINIC | Age: 38
End: 2023-06-23

## 2023-06-23 ENCOUNTER — APPOINTMENT (OUTPATIENT)
Dept: LAB | Facility: CLINIC | Age: 38
End: 2023-06-23
Payer: COMMERCIAL

## 2023-06-23 DIAGNOSIS — R10.9 FLANK PAIN: ICD-10-CM

## 2023-06-23 DIAGNOSIS — R10.9 FLANK PAIN: Primary | ICD-10-CM

## 2023-06-23 LAB
ALBUMIN SERPL BCP-MCNC: 2.8 G/DL (ref 3.5–5)
ALP SERPL-CCNC: 34 U/L (ref 46–116)
ALT SERPL W P-5'-P-CCNC: 26 U/L (ref 12–78)
AMORPH URATE CRY URNS QL MICRO: ABNORMAL
ANION GAP SERPL CALCULATED.3IONS-SCNC: 2 MMOL/L
AST SERPL W P-5'-P-CCNC: 10 U/L (ref 5–45)
BACTERIA UR QL AUTO: ABNORMAL /HPF
BILIRUB SERPL-MCNC: 0.18 MG/DL (ref 0.2–1)
BILIRUB UR QL STRIP: NEGATIVE
BUN SERPL-MCNC: 7 MG/DL (ref 5–25)
CALCIUM ALBUM COR SERPL-MCNC: 10.1 MG/DL (ref 8.3–10.1)
CALCIUM SERPL-MCNC: 9.1 MG/DL (ref 8.3–10.1)
CHLORIDE SERPL-SCNC: 111 MMOL/L (ref 96–108)
CLARITY UR: CLEAR
CO2 SERPL-SCNC: 25 MMOL/L (ref 21–32)
COLOR UR: YELLOW
CREAT SERPL-MCNC: 0.63 MG/DL (ref 0.6–1.3)
ERYTHROCYTE [DISTWIDTH] IN BLOOD BY AUTOMATED COUNT: 13.5 % (ref 11.6–15.1)
GFR SERPL CREATININE-BSD FRML MDRD: 113 ML/MIN/1.73SQ M
GLUCOSE SERPL-MCNC: 87 MG/DL (ref 65–140)
GLUCOSE UR STRIP-MCNC: NEGATIVE MG/DL
HCT VFR BLD AUTO: 37 % (ref 34.8–46.1)
HGB BLD-MCNC: 11.7 G/DL (ref 11.5–15.4)
HGB UR QL STRIP.AUTO: NEGATIVE
KETONES UR STRIP-MCNC: NEGATIVE MG/DL
LEUKOCYTE ESTERASE UR QL STRIP: NEGATIVE
MCH RBC QN AUTO: 28.1 PG (ref 26.8–34.3)
MCHC RBC AUTO-ENTMCNC: 31.6 G/DL (ref 31.4–37.4)
MCV RBC AUTO: 89 FL (ref 82–98)
MUCOUS THREADS UR QL AUTO: ABNORMAL
NITRITE UR QL STRIP: NEGATIVE
NON-SQ EPI CELLS URNS QL MICRO: ABNORMAL /HPF
PH UR STRIP.AUTO: 6.5 [PH]
PLATELET # BLD AUTO: 261 THOUSANDS/UL (ref 149–390)
PMV BLD AUTO: 10.8 FL (ref 8.9–12.7)
POTASSIUM SERPL-SCNC: 4.1 MMOL/L (ref 3.5–5.3)
PROT SERPL-MCNC: 6.6 G/DL (ref 6.4–8.4)
PROT UR STRIP-MCNC: NEGATIVE MG/DL
RBC # BLD AUTO: 4.16 MILLION/UL (ref 3.81–5.12)
RBC #/AREA URNS AUTO: ABNORMAL /HPF
SODIUM SERPL-SCNC: 138 MMOL/L (ref 135–147)
SP GR UR STRIP.AUTO: 1.01 (ref 1–1.03)
UROBILINOGEN UR STRIP-ACNC: <2 MG/DL
WBC # BLD AUTO: 11.27 THOUSAND/UL (ref 4.31–10.16)
WBC #/AREA URNS AUTO: ABNORMAL /HPF

## 2023-06-23 PROCEDURE — 36415 COLL VENOUS BLD VENIPUNCTURE: CPT

## 2023-06-23 PROCEDURE — 87086 URINE CULTURE/COLONY COUNT: CPT

## 2023-06-23 PROCEDURE — 85027 COMPLETE CBC AUTOMATED: CPT

## 2023-06-23 PROCEDURE — 80053 COMPREHEN METABOLIC PANEL: CPT

## 2023-06-23 PROCEDURE — 81001 URINALYSIS AUTO W/SCOPE: CPT | Performed by: OBSTETRICS & GYNECOLOGY

## 2023-06-23 RX ORDER — LIDOCAINE 40 MG/G
CREAM TOPICAL AS NEEDED
Qty: 30 G | Refills: 0 | Status: SHIPPED | OUTPATIENT
Start: 2023-06-23

## 2023-06-23 RX ORDER — LIDOCAINE 50 MG/G
1 PATCH TOPICAL DAILY
Qty: 30 PATCH | Refills: 2 | Status: SHIPPED | OUTPATIENT
Start: 2023-06-23 | End: 2023-06-23

## 2023-06-23 RX ORDER — CYCLOBENZAPRINE HCL 10 MG
10 TABLET ORAL 3 TIMES DAILY PRN
Qty: 30 TABLET | Refills: 3 | Status: SHIPPED | OUTPATIENT
Start: 2023-06-23

## 2023-06-23 NOTE — TELEPHONE ENCOUNTER
----- Message from Stewart Vidales sent at 6/23/2023  9:40 AM EDT -----  Regarding: FW: Still experiencing pain   Contact: [phone]    ----- Message -----  From: Julio Jauregui  Sent: 6/23/2023   8:58 AM EDT  To: #  Subject: Still experiencing pain                          Hi Dr America Delgado-   I hope you can give some guidance  I was on the week long Keflex & Flagyl, stopped & was admitted last Friday  Pain never went away in my back & pelvis area but it greatly lowered, by Wed night I’m limping with a pulling pain in my lower right pelvis to my upper thigh & yesterday I needed the heat on all day & night with the increased frequency again  The burning is minimized  I’m grasping at straws but would it be beneficial to try another antibiotic other than the Keflex? In the past when I had terrible UTI/BV flare ups, Macrobid had provided some relief (I needed to go back & look at old notes because I couldn’t remember)  I consider myself to have a high pain tolerance, I’m just extremely uncomfortable and this is interfering with my day-to-day work & home life  I don’t believe this is my IC but I’m open to reaching out to [facility] as well  Just thought I’d start with you first  Sorry for reaching out so frequent, I just want some relief     Thanks-   Amee Godinez

## 2023-06-23 NOTE — TELEPHONE ENCOUNTER
Still having back pain above hip  Does have urinary frequency  She is doing PT    Urine culture from last week did not show kidney infection, kidney ultrasound was negative  Does have a history of interstitial cystitis    Advised patient Flexeril 3 times a day for muscle spasm  Also advised lidocaine patch    She does not want to go to the hospital unless necessary but agrees to repeat blood work and urine testing    Repeat kidney ultrasound was also ordered for patient    We will call her with results

## 2023-06-25 LAB — BACTERIA UR CULT: NORMAL

## 2023-06-29 ENCOUNTER — OFFICE VISIT (OUTPATIENT)
Dept: NEUROLOGY | Facility: CLINIC | Age: 38
End: 2023-06-29
Payer: COMMERCIAL

## 2023-06-29 VITALS
WEIGHT: 251 LBS | HEART RATE: 98 BPM | TEMPERATURE: 97.4 F | HEIGHT: 71 IN | DIASTOLIC BLOOD PRESSURE: 98 MMHG | SYSTOLIC BLOOD PRESSURE: 134 MMHG | BODY MASS INDEX: 35.14 KG/M2

## 2023-06-29 DIAGNOSIS — G93.2 IIH (IDIOPATHIC INTRACRANIAL HYPERTENSION): Primary | ICD-10-CM

## 2023-06-29 RX ORDER — ACETAMINOPHEN, ASPIRIN AND CAFFEINE 250; 250; 65 MG/1; MG/1; MG/1
1 TABLET, FILM COATED ORAL EVERY 6 HOURS PRN
COMMUNITY

## 2023-06-29 NOTE — PATIENT INSTRUCTIONS
As we discussed if needed I could order an electrolyte panel that you can get at any point if we are worried about your potassium  As we discussed the incidental cavernoma is likely that and we will follow-up with MRI brain with without contrast following birth nonurgently to assess for any changes which are unlikely  If you have any new or concerning vision please go to the emergency department of course but also follow-up with eye doctor for dilated eye exam       Additional Testing:   Neurodiagnostic workup: MRI Brain with contrast will be ordered after pregnancy     Headache/migraine treatment:   Abortive medications (for immediate treatment of a headache): It is ok to take ibuprofen, acetaminophen or naproxen (Advil, Tylenol,  Aleve, Excedrin) if they help your headaches you should limit these to No more than 3 times a week to avoid medication overuse/rebound headaches       For your more moderate to severe migraines take this medication early   Try exedrin tension     Rescue medications thought to be likely ok during pregnancy:   First line: tylenol/acetaminophen, diphenhydramine/benadryl, Metoclopramide/Reglan, [NSAIDs (ibuprofen, naproxen and diclofenac) could be considered between 12-20 weeks only , but check with OB]  Second line:  rizatriptan/sumatriptan and other triptans, Ondansetron/zofran, prednisone short acting, prochlorperazine/compazine, promethazine/Phenergan, [also fioricet (butalbital-acetaminophen-caffeine) although I do not recommend this typically]  Avoid when possible: aspirin (except in those instructed to take by OB for other reasons), indomethacin, opioids   Always avoid: DHE/ergots, currently the new migraine meds have too many unknowns so we are avoiding for now until more information - lasmiditan/reyvow, Nurtec/rimegepant, Ubrelvy/Ubrogepant     These medications are thought to be compatible with breast-feeding:  Ibuprofen, acetaminophen, triptans  Probably compatible with breast-feeding:  Diphenhydramine/Benadryl, metoclopramide/Reglan, ondansetron /Zofran, if needed steroids, nurtec  prochlorperazine/Compazine  Avoid: Aspirin, DHE/ergot's, lasmiditan/Reyvow      Over the counter preventive supplements for headaches/migraines (if you try, try for 3 months straight)  (to take every day to help prevent headaches - not to take at the time of headache): There are combo pills online of these - none of which regulated by FDA and double check dosing - take appropriate dose only once a day- preventa migraine, migravent, mind ease, migrelief   [x] Magnesium 400mg daily (If any diarrhea or upset stomach, decrease dose  as tolerated)  [x] Riboflavin (Vitamin B2) 400mg daily - try online  (FYI B2 may make your urine bright/neon yellow)      Prescription preventive medications for headaches/migraines   (to take every day to help prevent headaches - not to take at the time of headache):  [x]     I suggest trial of lower dose diamox than we typically use in more severe cases -  -     acetaZOLAMIDE (DIAMOX) 125 mg tablet    -Please try 1 tab in the a m  for 1 week and then if needed could try 2 tabs in the a m  for 1 week and then if needed could add 1 tab midday or in p m  if needed  We discussed basically you could gradually 125 mg to your dose as tolerated but keep in touch with me if any questions or concerns or side effects  -If you had papilledema or swelling behind your eyes we would rapidly get you up to 500 mg twice a day and may need to go higher  I have 1 patient who is up to 3000 mg in a day just for reference and I will be starting you on 125 mg       - if a lower dose is helpful, can stay lower, if higher dose causes side effects, go back to last tolerated dose    If you get all the way up to the dose recommended and is not helping enough let me know as I will absolutely go higher     - make sure to stay hydrated while on this as can cause dehydration since that is it's purpose to take fluid off    - most common side effect is tingling of the nerves at times   - can cause electrolyte disturbances, but not typically in any significant way  However, be cautious if taking with other meds that lower potassium like hydrochlorothiazide etc      *Typically these types of medications take time untill you see the benefit, although some may see improvement in days, often it may take weeks, especially if the medication is being titrated up to a beneficial level  Please contact us if there are any concerns or questions regarding the medication  Lifestyle Recommendations:  [x] SLEEP - Maintain a regular sleep schedule: Adults need at least 7-8 hours of uninterrupted a night  Maintain good sleep hygiene:  Going to bed and waking up at consistent times, avoiding excessive daytime naps, avoiding caffeinated beverages in the evening, avoid excessive stimulation in the evening and generally using bed primarily for sleeping  One hour before bedtime would recommend turning lights down lower, decreasing your activity (may read quietly, listen to music at a low volume)  When you get into bed, should eliminate all technology (no texting, emailing, playing with your phone, iPad or tablet in bed)  [x] HYDRATION - Maintain good hydration  Drink  2L of fluid a day (4 typical small water bottles)  [x] DIET - Maintain good nutrition  In particular don't skip meals and try and eat healthy balanced meals regularly  [x] TRIGGERS - Look for other triggers and avoid them: Limit caffeine to 1-2 cups a day or less  Avoid dietary triggers that you have noticed bring on your headaches (this could include aged cheese, peanuts, MSG, aspartame and nitrates)  [x] EXERCISE - physical exercise as we all know is good for you in many ways, and not only is good for your heart, but also is beneficial for your mental health, cognitive health and  chronic pain/headaches   I would encourage at the least 5 days of physical exercise weekly for at least 30 minutes  Education and Follow-up  [x] Please call with any questions or concerns  Of course if any new concerning symptoms go to the emergency department    [x] Follow up anytime in July or August, sooner if needed

## 2023-06-29 NOTE — PROGRESS NOTES
[facility] Consult - Follow up   PATIENT:  Otoniel Wang  MRN:  [id number]  :  1985  DATE OF SERVICE:  2023  REFERRED BY: No ref  provider found  PMD: Esther Lozada PA-C    Assessment/Plan:   Otoniel Wang is a very pleasant  43 y o  female with a past medical history that includes asthma, hypertension, depression, GERD, BMI over 30, chronic back pain, interstitial cystitis, idiopathic hypersomnia on Adderall, history of PE 3/2009 b/l unknown (, coumadin 9 months, lovenox in pregnancy), migraines with and without aura, PCOS, hand paresthesias, ulnar entrapment at the elbow, poor sleep, endometriosis, status postcholecystectomy, idiopathic hypersomnia, hyperlipidemia, venous insufficiency referred here for evaluation of headache  My initial evaluation 2023    Chronic migraine without aura without status migrainosus, not intractable  Pregnant  Suspected idiopathic intracranial hypertension   She reports a long history of headaches dating back to childhood that worsened in high school  She reports she has been on blood pressure pills since age 24  She had issues with infertility and was planning on doing IUI when she got pregnant on topiramate 100 mg twice daily through other providers and stopped it quickly  She comes to see me today due to currently being 21 weeks pregnant and increasing headaches off of topiramate  She reports pain is typically occipital bilaterally pressure stabbing and dull, bitemporal, retro-orbital and can be bilateral but more often on the right  She denies typical aura and reports typical associated migrainous features with some atypical features including blurred vision worse on the right with some autonomic features   -As of 2023: She reports headaches have increased in frequency during pregnancy since around 8 to 10 weeks  She stopped topiramate 100 mg BID when she found out she was pregnant    We discussed "how she may have had a subclinical IIH picture that was worsened coming off of topiramate and with pregnancy  We discussed MRI brain to rule out nefarious causes,  If signs of intracranial hypertension found or if any significant vision changes we discussed adding trial of acetazolamide/Diamox since topiramate would not be safe during pregnancy  Discussed which medications are thought to be safest and agreed that magnesium and riboflavin are helpful for prevention  Discussed I do not recommend Fioricet and to replace it with \"Excedrin tension\" which is the same without the barbiturate, but that anything as needed would not be the full answer since headaches are daily  She reports very poor sleep which is likely the biggest trigger and follows with sleep medicine for idiopathic hypersomnia  We discussed positionally sleeping on the side may be better or with multiple pillows propped up, as despite sleep studies being negative I still suspect a component of sleep apnea leading to subclinical IIH picture  - as of 6/29/2023: MRI brain reviewed which showed multiple findings consistent with 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 and we discussed I suspect the topiramate 100 mg twice daily was treating this before pregnancy and therefore why she has been so much worse since having to stop it with pregnancy  We discussed I suspect IIH may at times be causing cervical medullary compression type symptoms as well as possibly contributing to her back pain which thankfully is somewhat better with cyclobenzaprine/Flexeril  She is still getting at least 5 headaches a week we discussed trial of acetazolamide/Diamox in the a m  since she did not tolerate in the p m  as any dose could diurese some of the suspected increased intracranial pressure  We discussed we will obtain MRI brain with and without contrast to further follow-up the cavernoma after pregnancy at her leisure  CMP in place to monitor potassium      Workup:  -MRI brain " without contrast 6/15/2023: 1   0 5 cm left parietal 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  Although the imaging appearance is typical for  cavernoma, may consider follow-up brain MRI without and with contrast after delivery to complete   characterization and confirm stability  2   A few foci of T2/FLAIR hyperintensity involving biparietal subcortical white matter  Finding is nonspecific with broad differential considerations     -We discussed due to the MRI findings as above and radiology recommending follow-up to confirm stability for the possible cavernoma, we will order MRI brain with and without contrast following birth and she will reach out to me to trigger me to order this or we will order it at her next visit  Preventative:  - we discussed headache hygiene and lifestyle factors that may improve headaches  - Trial of acetazolamide -she started 125 mg at night and found it was waking her up even more to urinate and therefore we said to take it in the morning instead  -Please try 1 tab in the a m  for 1 week and then if needed could try 2 tabs in the a m  for 1 week and then if needed could add 1 tab midday or in p m  if needed  We discussed basically you could gradually 125 mg to your dose as tolerated but keep in touch with me if any questions or concerns or side effects  Discussed proper use, possible side effects and risks  We also discussed the unknowns of medications during pregnancy but after discussion with MFM and OB regarding multiple cases it is thought that the benefits outweigh the risks considering vision loss for the mom is part of the risks  - Currently on through other providers:  We discussed continuing headache preventative supplements and increasing magnesium to 400- 500 mg and riboflavin 400 mg  - Past/ failed/contraindicated: Propranolol would be avoided long-term due to history of asthma although can be used in pregnancy, topiramate 100 mg twice daily stopped January 2023 due to pregnancy, amitriptyline, lisinopril, buspirone/BuSpar, fluoxetine/Prozac, Adderall 40 mg in a m  prepregnancy, lisinopril  - future options during pregnancy: Acetazolamide/Diamox, cyproheptadine, cyclobenzaprine, amitriptyline, propranolol, verapamil, botox    Abortive:  - discussed not taking over-the-counter or prescription pain medications more than 3 days per week to prevent medication overuse/rebound headache  - We discussed I would recommend if needed for significant migraine considering rizatriptan 5 to 10 mg  Discussed proper use, possible side effects and risks  - Currently on through other providers: Metoclopramide no to little relief  - Past/ failed/contraindicated:  We discussed I do not recommend Fioricet ever, sumatriptan side effects of increased heart rate sweatiness and she is not interested in another triptan  -Past/tolerated: Steroids  - future options post pregnancy:   prochlorperazine, Toradol IM or p o , could consider trial for 5 days of Depakote or dexamethasone for prolonged migraine, ubrelvy, reyvow, nurtec      -Future options as needed during pregnancy:  Rescue medications thought to be likely ok during pregnancy:   First line: tylenol/acetaminophen, diphenhydramine/benadryl, Metoclopramide/Reglan, [NSAIDs (ibuprofen, naproxen and diclofenac) could be considered between 12-20 weeks only , but check with OB]  Second line:  rizatriptan/sumatriptan and other triptans, Ondansetron/zofran, prednisone short acting, prochlorperazine/compazine, promethazine/Phenergan, [also fioricet (butalbital-acetaminophen-caffeine) although I do not recommend this typically]  Avoid when possible: aspirin (except in those instructed to take by OB for other reasons), indomethacin, opioids   Always avoid: DHE/ergots, currently the new migraine meds have too many unknowns so we are avoiding for now until more information - lasmiditan/reyvow, Nurtec/rimegepant, Ubrelvy/Ubrogepant     These medications are thought to be compatible with breast-feeding:  Ibuprofen, acetaminophen, triptans  Probably compatible with breast-feeding:  Diphenhydramine/Benadryl, metoclopramide/Reglan, ondansetron /Zofran, if needed steroids, nurtec  prochlorperazine/Compazine  Avoid: Aspirin, DHE/ergot's, lasmiditan/Reyvow        Patient instructions      As we discussed if needed I could order an electrolyte panel that you can get at any point if we are worried about your potassium  As we discussed the incidental cavernoma is likely that and we will follow-up with MRI brain with without contrast following birth nonurgently to assess for any changes which are unlikely  If you have any new or concerning vision please go to the emergency department of course but also follow-up with eye doctor for dilated eye exam       Additional Testing:   Neurodiagnostic workup: MRI Brain with contrast will be ordered after pregnancy     Headache/migraine treatment:   Abortive medications (for immediate treatment of a headache): It is ok to take ibuprofen, acetaminophen or naproxen (Advil, Tylenol,  Aleve, Excedrin) if they help your headaches you should limit these to No more than 3 times a week to avoid medication overuse/rebound headaches       For your more moderate to severe migraines take this medication early   Try exedrin tension     Rescue medications thought to be likely ok during pregnancy:   First line: tylenol/acetaminophen, diphenhydramine/benadryl, Metoclopramide/Reglan, [NSAIDs (ibuprofen, naproxen and diclofenac) could be considered between 12-20 weeks only , but check with OB]  Second line:  rizatriptan/sumatriptan and other triptans, Ondansetron/zofran, prednisone short acting, prochlorperazine/compazine, promethazine/Phenergan, [also fioricet (butalbital-acetaminophen-caffeine) although I do not recommend this typically]  Avoid when possible: aspirin (except in those instructed to take by OB for other reasons), indomethacin, opioids   Always avoid: DHE/ergots, currently the new migraine meds have too many unknowns so we are avoiding for now until more information - lasmiditan/reyvow, Nurtec/rimegepant, Ubrelvy/Ubrogepant     These medications are thought to be compatible with breast-feeding:  Ibuprofen, acetaminophen, triptans  Probably compatible with breast-feeding:  Diphenhydramine/Benadryl, metoclopramide/Reglan, ondansetron /Zofran, if needed steroids, nurtec  prochlorperazine/Compazine  Avoid: Aspirin, DHE/ergot's, lasmiditan/Reyvow      Over the counter preventive supplements for headaches/migraines (if you try, try for 3 months straight)  (to take every day to help prevent headaches - not to take at the time of headache): There are combo pills online of these - none of which regulated by FDA and double check dosing - take appropriate dose only once a day- preventa migraine, migravent, mind ease, migrelief   [x] Magnesium 400mg daily (If any diarrhea or upset stomach, decrease dose  as tolerated)  [x] Riboflavin (Vitamin B2) 400mg daily - try online  (FYI B2 may make your urine bright/neon yellow)      Prescription preventive medications for headaches/migraines   (to take every day to help prevent headaches - not to take at the time of headache):  [x]     I suggest trial of lower dose diamox than we typically use in more severe cases -  -     acetaZOLAMIDE (DIAMOX) 125 mg tablet    -Please try 1 tab in the a m  for 1 week and then if needed could try 2 tabs in the a m  for 1 week and then if needed could add 1 tab midday or in p m  if needed  We discussed basically you could gradually 125 mg to your dose as tolerated but keep in touch with me if any questions or concerns or side effects  -If you had papilledema or swelling behind your eyes we would rapidly get you up to 500 mg twice a day and may need to go higher    I have 1 patient who is up to 3000 mg in a day just for reference and I will be starting you on 125 mg       - if a lower dose is helpful, can stay lower, if higher dose causes side effects, go back to last tolerated dose  If you get all the way up to the dose recommended and is not helping enough let me know as I will absolutely go higher     - make sure to stay hydrated while on this as can cause dehydration since that is it's purpose to take fluid off    - most common side effect is tingling of the nerves at times   - can cause electrolyte disturbances, but not typically in any significant way  However, be cautious if taking with other meds that lower potassium like hydrochlorothiazide etc      *Typically these types of medications take time untill you see the benefit, although some may see improvement in days, often it may take weeks, especially if the medication is being titrated up to a beneficial level  Please contact us if there are any concerns or questions regarding the medication  Lifestyle Recommendations:  [x] SLEEP - Maintain a regular sleep schedule: Adults need at least 7-8 hours of uninterrupted a night  Maintain good sleep hygiene:  Going to bed and waking up at consistent times, avoiding excessive daytime naps, avoiding caffeinated beverages in the evening, avoid excessive stimulation in the evening and generally using bed primarily for sleeping  One hour before bedtime would recommend turning lights down lower, decreasing your activity (may read quietly, listen to music at a low volume)  When you get into bed, should eliminate all technology (no texting, emailing, playing with your phone, iPad or tablet in bed)  [x] HYDRATION - Maintain good hydration  Drink  2L of fluid a day (4 typical small water bottles)  [x] DIET - Maintain good nutrition  In particular don't skip meals and try and eat healthy balanced meals regularly  [x] TRIGGERS - Look for other triggers and avoid them: Limit caffeine to 1-2 cups a day or less   Avoid dietary triggers that you have noticed bring on your headaches (this could include aged cheese, peanuts, MSG, aspartame and nitrates)  [x] EXERCISE - physical exercise as we all know is good for you in many ways, and not only is good for your heart, but also is beneficial for your mental health, cognitive health and  chronic pain/headaches  I would encourage at the least 5 days of physical exercise weekly for at least 30 minutes  Education and Follow-up  [x] Please call with any questions or concerns  Of course if any new concerning symptoms go to the emergency department  [x] Follow up anytime in July or August, sooner if needed          CC: We had the pleasure of evaluating Mike Dunbar in neurological consultation today  Mike Dunbar is a   right handed female who presents today for evaluation of headaches  History obtained from patient as well as available medical record review  History of Present Illness:   Interval history as of 6/29/2023  - no significant new or concerning neurologic symptoms since last visit   -Since last visit she was [facility] to [facility] for UTI didn't clear with meds, pain worse and IV abs, there 1 5 days   -6/22/2023 follow-up with PT for low back pain - better with the flexeril, uses heat helps, right flank  - Oct 8 in 40 weeks, they are taking her at 45    -MRI brain without contrast 6/15/2023: 1   0 5 cm left parietal 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  Although the imaging appearance is typical for  cavernoma, may consider follow-up brain MRI without and with contrast after delivery to complete   characterization and confirm stability  2   A few foci of T2/FLAIR hyperintensity involving biparietal subcortical white matter  Finding is nonspecific with broad differential considerations       - usually a stomach sleeper, now on side and sometimes recliner     Headaches and migraines   5 a week     Preventative:    - tried diamox for 4 nights and did not help sleep and woke up peeing more, already not getting great sleep   - increased to 400 mg has helped   - Currently on through other providers: We discussed continuing headache preventative supplements and increasing magnesium to 400- 500 mg and riboflavin 400 mg    Abortive:   - cyclobenzaprine 10 mg able to get 2-3 hours at once, got one week ago   - exedrin tension - every 2-3 days   - metoclopramide - doesn't make her sleepy, helps for nausea once in a while   - Currently on through other providers: darline - never took   Denies bothersome side effects        History as of initial visit 5/30/23:   She is currently pregnant and was referred by OB due to history of migraines prior to pregnancy and during pregnancy  She called in this morning stating that she lives far away requesting virtual visit and we discussed it will be harder to do with physical exam virtually which may impact care  21 weeks PREGNANT, due Oct 8th and plan for end of Sept      Headaches started at what age? Mild as a kid, High school worse   How often do the headaches occur? - BP pills since age 24 and climbing  - as of 5/30/2023: daily headaches since 8-10 weeks  What time of the day do the headaches start? Worse in am   How long do the headaches last? Worse through the day  Are you ever headache free? Yes    Aura? without aura     Last eye exam: wears glasses, 7 months ago, no new vision changes     Where is your headache located and pain quality?   - back of head towards base of spinal cord, bioccipital region - pressure, stabbing, dull,   Bitemporal  Retro-orbital  Can be bilateral but more often on the right    What is the intensity of pain?  Average: 4-5/10, worst 10+/10  Associated symptoms:   [] Nausea   - more with preg    [] Vomiting         [x] Photophobia     [x]Phonophobia      [] Osmophobia  [x] Blurred vision - once in a while, right eye  [x] Light-headed or dizzy   - from time to time if getting up   [] Tinnitus   [] Hands or feet tingle or feel numb/paresthesias    [] Ptosis      [] Facial droop  [] Lacrimation  [] Nasal congestion/rhinorrhea  - sometimes       Things that make the headache worse? No specific movements    Headache triggers: In the past chocolate caffeine, red wine, Hormonal changes    Have you seen someone else for headaches or pain? Yes, neurology in [facility] around [phone]  Have you had trigger point injection performed and how often? No  Have you had Botox injection performed and how often? No   Have you had epidural injections or transforaminal injections performed? No  Are you current pregnant or planning on getting pregnant? TBD - this was supposed to IUI but then got pregnant on topiramate but then stopped, was thinking another, not TBD  Have you ever had any Brain imaging? yes     What medications do you take or have you taken for your headaches? ABORTIVE:      Metoclopramide 10 mg - no relief     past  Fioricet -butalbital APAP caffeine - didn't start and I took off list   Steroids  imitrex - HR inc, sweaty    PREVENTIVE:     Magnesium 200 mg   Riboflavin 200 mg     Past/ failed/contraindicated:  Propranolol be contraindicated due to her history of asthma  topiramate 100 mg BID - Jan 2023   Amitriptyline   Lisinopril  Buspar/buspirone  Fluoxetine/prosac    Pre pregnancy adderall 40 mg in am, topiramate 100 mg BID, buspar, metformin, lisinopril      LIFESTYLE  Sleep -  History of idiopathic hypersomnia  Previous polysomnogram completed at [facility] on 10/2021 showed AHI of 0 9 events per hour with oxygen eli of 85%  - lost 120 pounds    PSG 11/16/22:  AHI 0 0 SpO2 eli 95%  MSLT 11/17/22: Sleep 5/5 naps; mean sleep latency 5 3 minutes  ESS 14 at    - averages: very poor, back pain, maybe 3 hours a night, pregnancy pillow on side or back in cocoon, slept much easier       Water: 1-2 gallon per day  Caffeine: 8 oz maybe soda    Mood:   Depression in the past, now overly tired but ok     The following portions of the patient's history were reviewed and updated as appropriate: allergies, current medications, past family history, past medical history, past social history, past surgical history and problem list     Pertinent family history:  Family history of headaches:  migraine headaches in mother, father and sister  Any family history of aneurysms - No    Pertinent social history:  Work:  of [employer]   Lives with significant other/boyfriend     Past Medical History:     Past Medical History:   Diagnosis Date   • Asthma    • Depression    • GERD (gastroesophageal reflux disease)    • Hypertension    • IC (interstitial cystitis)    • Migraines    • PCOS (polycystic ovarian syndrome)    • Pulmonary embolism ([facility] [address] ) 2009   • Venous insufficiency        Patient Active Problem List   Diagnosis   • Chronic hypertension affecting pregnancy   • Mild intermittent asthma without complication   • PCOS (polycystic ovarian syndrome)   • History of pulmonary embolism   • Family history of breast cancer   • Mass of lower inner quadrant of left breast   • Migraine   • Supervision of high-risk pregnancy   • 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   • 23 weeks gestation of pregnancy   • Interstitial cystitis   • GERD (gastroesophageal reflux disease)   • Depression affecting pregnancy   • Asthma   • Medication exposure during first trimester of pregnancy   • Obesity affecting pregnancy   • Group B streptococcal bacteriuria   • Primigravida of advanced maternal age in second trimester   • UTI (urinary tract infection) affecting pregnancy    • BV (bacterial vaginosis)       Medications:      Current Outpatient Medications   Medication Sig Dispense Refill   • acetaminophen (TYLENOL) 325 mg tablet Take 2 tablets (650 mg total) by mouth every 6 (six) hours as needed for mild pain or moderate pain  0   • albuterol (Ventolin HFA) 90 mcg/act inhaler Inhale 2 puffs every 4 (four) hours as needed for wheezing or shortness of breath 1 Inhaler 1   • aspirin (ECOTRIN LOW STRENGTH) 81 mg EC tablet Take 2 tablets (162 mg total) by mouth daily 180 tablet 1   • aspirin-acetaminophen-caffeine (EXCEDRIN MIGRAINE) 250-250-65 MG per tablet Take 1 tablet by mouth every 6 (six) hours as needed for headaches     • cephalexin (KEFLEX) 500 mg capsule Take 1 capsule (500 mg total) by mouth every 6 (six) hours for 12 days 48 capsule 0   • clotrimazole-betamethasone (LOTRISONE) 1-0 05 % cream Apply topically 2 (two) times a day as needed (itching) 30 g 3   • cyclobenzaprine (FLEXERIL) 10 mg tablet Take 1 tablet (10 mg total) by mouth 3 (three) times a day as needed for muscle spasms 30 tablet 3   • Doxylamine Succinate, Sleep, (UNISOM PO) Take 1 tablet by mouth daily     • enoxaparin (LOVENOX) 40 mg/0 4 mL Inject 0 4 mL (40 mg total) under the skin in the morning for 30 doses 12 mL 3   • fluticasone (FLONASE) 50 mcg/act nasal spray 1 spray into each nostril daily 15 8 mL 3   • lidocaine (LMX) 4 % cream Apply topically as needed for mild pain 30 g 0   • loratadine (CLARITIN) 10 mg tablet Take 10 mg by mouth daily     • MAGNESIUM CITRATE PO Take by mouth 500 mg daily     • metoclopramide (Reglan) 10 mg tablet Take 1 tablet (10 mg total) by mouth every 6 (six) hours as needed (headache, nausea) 30 tablet 0   • montelukast (SINGULAIR) 10 mg tablet Take 10 mg by mouth daily     • Pyridoxine HCl (VITAMIN B6 PO) Take 400 mg by mouth in the morning     • Riboflavin (B2) 100 MG TABS Take 400 mg by mouth in the morning     • cephalexin (KEFLEX) 500 mg capsule Take 1 capsule (500 mg total) by mouth every 24 hours Take as daily suppression therapy after completion of treatment for pyelonephritis (Patient not taking: Reported on 6/29/2023) 90 capsule 0     No current facility-administered medications for this visit  Allergies:       Allergies   Allergen Reactions   • Erythromycin Swelling   • Ethylenediamine Dermatitis, Hives, Itching and Swelling   • Propolis Dermatitis, Hives, Itching, Swelling and Rash   • Sumatriptan Chest Pain and Palpitations     to all TRIPTANS   • Triptans Palpitations     flushing       Family History:     Family History   Problem Relation Age of Onset   • Hyperlipidemia Mother    • Diabetes Mother    • Hypertension Mother    • Osteoporosis Mother    • Anxiety disorder Mother    • Depression Mother    • Arthritis Mother    • Anxiety disorder Father    • Depression Father    • Asthma Father    • Cancer Father         Lung   • Deep vein thrombosis Father    • Pulmonary embolism Father    • Venous thrombosis Father    • Heart disease Father    • Hyperlipidemia Father    • Hypertension Father    • COPD Father    • Factor V Leiden deficiency Father    • Stroke Father    • Factor V Leiden deficiency Sister    • Migraines Sister    • Thyroid disease Sister    • Polycystic ovary syndrome Sister    • Diabetes Maternal Grandmother    • Osteoporosis Maternal Grandmother    • Breast cancer additional onset Paternal Grandmother 80   • Breast cancer Paternal Grandmother 79   • Cancer Paternal Grandmother         Breast   • Diabetes Paternal Grandmother    • Prostate cancer Paternal Uncle        Social History:     Social History     Socioeconomic History   • Marital status: Single     Spouse name: Not on file   • Number of children: Not on file   • Years of education: Not on file   • Highest education level: Not on file   Occupational History   • Not on file   Tobacco Use   • Smoking status: Never   • Smokeless tobacco: Never   Vaping Use   • Vaping Use: Never used   Substance and Sexual Activity   • Alcohol use: Not Currently   • Drug use: Never   • Sexual activity: Not Currently     Partners: Male     Birth control/protection: Condom Male   Other Topics Concern   • Not on file   Social History Narrative   • Not on file     Social Determinants of Health     Financial Resource Strain: Not on file   Food Insecurity: Not on file "  Transportation Needs: Not on file   Physical Activity: Not on file   Stress: Not on file   Social Connections: Not on file   Intimate Partner Violence: Not on file   Housing Stability: Not on file         Objective:       Physical Exam:                                                                 Vitals:            Constitutional:    /98 (BP Location: Right arm, Patient Position: Sitting, Cuff Size: Standard)   Pulse 98   Temp (!) 97 4 °F (36 3 °C) (Tympanic)   Ht 5' 11\" (1 803 m)   Wt 114 kg (251 lb)   LMP 01/06/2023   BMI 35 01 kg/m²   BP Readings from Last 3 Encounters:   06/29/23 134/98   06/17/23 121/69   06/16/23 136/78     Pulse Readings from Last 3 Encounters:   06/29/23 98   06/17/23 86   06/16/23 89         Well developed, well nourished, well groomed  No dysmorphic features  HEENT:  Normocephalic atraumatic  Oropharynx appears clear and moist  No oral mucosal lesions noted  Chest:  Respirations appear regular and unlabored  Cardiovascular:  Distal extremities warm without palpable edema or tenderness, no observed significant swelling  Musculoskeletal:  (see below under neurologic exam for evaluation of motor function and gait)   Skin:  warm and dry  No apparent birthmarks or stigmata of neurocutaneous disease  Psychiatric:  Normal behavior and appropriate affect        Neurological Examination:     Mental status/cognitive function:   Recent and remote memory intact  Attention span and concentration as well as fund of knowledge are appropriate for age  Normal language and spontaneous speech  Cranial Nerves:  II-visual fields full  Fundi poorly visualized due to pupillary constriction  III, IV, VI-Pupils were equal, round, and reactive to light and accommodation  Extraocular movements were full and conjugate without nystagmus  V-facial sensation symmetric      VII-facial expression symmetric, intact forehead wrinkle, strong eye closure, symmetric smile  " VIII-hearing grossly intact bilaterally   IX, X-palate elevation symmetric, no dysarthria  XI-shoulder shrug strength intact    XII-tongue protrusion midline  Motor Exam: symmetric bulk and tone throughout, no pronator drift  Power/strength 5/5 bilateral upper and lower extremities, no atrophy, fasciculations or abnormal movements noted  Sensory: grossly intact light touch in all extremities  Reflexes: brachioradialis 2+, biceps 2+, knee 2+, ankle 2+ bilaterally  No ankle clonus   Coordination: Finger nose finger intact bilaterally, no apparent dysmetria, ataxia or tremor noted  Gait: steady casual and tandem gait  Pertinent lab results:   -3/21/2023 CMP remarkable for glucose 129   CBC unremarkable  TSH normal     Imaging: I have personally reviewed imaging and radiology read   -MRI brain without contrast 6/15/2023: 1   0 5 cm left parietal 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  Although the imaging appearance is typical for  cavernoma, may consider follow-up brain MRI without and with contrast after delivery to complete characterization and confirm stability  2   A few foci of T2/FLAIR hyperintensity involving biparietal subcortical white matter  Finding is nonspecific with broad differential considerations          Review of Systems:   ROS obtained by medical assistant Personally reviewed and updated if indicated  I recommended PCP follow up for non neurologic problems  Review of Systems   Constitutional: Negative for appetite change and fever  HENT: Negative  Negative for hearing loss, tinnitus, trouble swallowing and voice change  Eyes: Negative  Negative for photophobia and pain  Respiratory: Negative  Negative for shortness of breath  Cardiovascular: Negative  Negative for palpitations  Gastrointestinal: Negative  Negative for nausea and vomiting  Endocrine: Negative  Negative for cold intolerance     Genitourinary: Negative  Negative for dysuria, frequency and urgency  Musculoskeletal: Negative  Negative for myalgias and neck pain  Skin: Negative  Negative for rash  Neurological: Positive for headaches  Negative for dizziness, tremors, seizures, syncope, facial asymmetry, speech difficulty, weakness, light-headedness and numbness  Hematological: Negative  Does not bruise/bleed easily  Psychiatric/Behavioral: Positive for sleep disturbance  Negative for confusion and hallucinations  All other systems reviewed and are negative  I have spent 34 minutes with Patient  today in which greater than 50% of this time was spent in counseling/coordination of care regarding Diagnostic results, Prognosis, Risks and benefits of tx options, Instructions for management, Patient and family education, Importance of tx compliance, Risk factor reductions, Impressions, Counseling / Coordination of care, Documenting in the medical record, Reviewing / ordering tests, medicine, procedures   and Obtaining or reviewing history    I also spent 25 minutes non face to face for this patient the same day         Author:  Luis Eduardo Jha MD 6/29/2023 5:01 PM

## 2023-06-30 ENCOUNTER — ROUTINE PRENATAL (OUTPATIENT)
Dept: OBGYN CLINIC | Facility: CLINIC | Age: 38
End: 2023-06-30
Payer: COMMERCIAL

## 2023-06-30 VITALS
HEIGHT: 71 IN | OXYGEN SATURATION: 99 % | BODY MASS INDEX: 35.19 KG/M2 | DIASTOLIC BLOOD PRESSURE: 70 MMHG | WEIGHT: 251.4 LBS | TEMPERATURE: 98.3 F | SYSTOLIC BLOOD PRESSURE: 134 MMHG | HEART RATE: 108 BPM

## 2023-06-30 DIAGNOSIS — Z3A.25 25 WEEKS GESTATION OF PREGNANCY: ICD-10-CM

## 2023-06-30 DIAGNOSIS — O09.891 MEDICATION EXPOSURE DURING FIRST TRIMESTER OF PREGNANCY: Primary | ICD-10-CM

## 2023-06-30 DIAGNOSIS — O09.92 SUPERVISION OF HIGH RISK PREGNANCY IN SECOND TRIMESTER: ICD-10-CM

## 2023-06-30 DIAGNOSIS — R82.71 GROUP B STREPTOCOCCAL BACTERIURIA: ICD-10-CM

## 2023-06-30 DIAGNOSIS — O10.919 CHRONIC HYPERTENSION AFFECTING PREGNANCY: ICD-10-CM

## 2023-06-30 DIAGNOSIS — Z14.8 CARRIER OF GENETIC DISORDER: ICD-10-CM

## 2023-06-30 DIAGNOSIS — N30.00 ACUTE CYSTITIS WITHOUT HEMATURIA: ICD-10-CM

## 2023-06-30 PROBLEM — O26.02 EXCESSIVE WEIGHT GAIN DURING PREGNANCY IN SECOND TRIMESTER: Status: ACTIVE | Noted: 2023-06-30

## 2023-06-30 LAB
SL AMB  POCT GLUCOSE, UA: NEGATIVE
SL AMB POCT URINE PROTEIN: NEGATIVE

## 2023-06-30 PROCEDURE — PNV: Performed by: OBSTETRICS & GYNECOLOGY

## 2023-06-30 PROCEDURE — 81002 URINALYSIS NONAUTO W/O SCOPE: CPT | Performed by: OBSTETRICS & GYNECOLOGY

## 2023-06-30 RX ORDER — CEPHALEXIN 500 MG/1
500 CAPSULE ORAL EVERY 24 HOURS
Qty: 90 CAPSULE | Refills: 1 | Status: SHIPPED | OUTPATIENT
Start: 2023-06-30 | End: 2023-09-28

## 2023-06-30 NOTE — PROGRESS NOTES
"    S: 43 y o  [address] Jessi Enriquez 25w5d here for routine prenatal visit  Chief Complaint   Patient presents with   • Routine Prenatal Visit     Pt states she is still on ABX, needs refill of the Keflex needs to be prescribed as 1 tablet daily, states that she is still having urinary frequency, no other sx         OB complaints:  Contractions: no  Leakage: no  Bleeding: no  Fetal movement: yes      O:  /70 (BP Location: Right arm, Patient Position: Sitting, Cuff Size: Adult)   Pulse (!) 108   Temp 98 3 °F (36 8 °C) (Tympanic)   Ht 5' 11\" (1 803 m)   Wt 114 kg (251 lb 6 4 oz)   LMP 01/06/2023   SpO2 99%   BMI 35 06 kg/m²       Review of Systems   Constitutional: Negative for chills and fever  Eyes: Negative for visual disturbance  Respiratory: Negative for chest tightness and shortness of breath  Cardiovascular: Negative for chest pain  Gastrointestinal: Negative for abdominal pain, diarrhea, nausea and vomiting  Genitourinary: Positive for frequency  Negative for dysuria, pelvic pain and vaginal bleeding  As noted in HPI   Skin: Negative for rash  Neurological: Negative for headaches  All other systems reviewed and are negative  Physical Exam  Constitutional:       General: She is not in acute distress  Appearance: Normal appearance  HENT:      Head: Normocephalic and atraumatic  Cardiovascular:      Rate and Rhythm: Normal rate  Pulmonary:      Effort: Pulmonary effort is normal  No respiratory distress  Abdominal:      General: There is no distension  Palpations: Abdomen is soft  Tenderness: There is no abdominal tenderness  There is no guarding or rebound  Comments: gravid   Neurological:      General: No focal deficit present  Mental Status: She is alert  Psychiatric:         Mood and Affect: Mood normal          Behavior: Behavior normal    Vitals and nursing note reviewed             Fundal Height (cm): 25 cm  Fetal Heart Rate: " 150    Pregravid Weight/BMI: 91 6 kg (202 lb) (BMI 28 19)  Current Weight: 114 kg (251 lb 6 4 oz)   Total Weight Gain: 22 4 kg (49 lb 6 4 oz)     Pre- Vitals    Flowsheet Row Most Recent Value   Prenatal Assessment    Fetal Heart Rate 150   Fundal Height (cm) 25 cm   Movement Present   Prenatal Vitals    Blood Pressure 134/70   Weight - Scale 114 kg (251 lb 6 4 oz)   Urine Albumin/Glucose    Dilation/Effacement/Station    Vaginal Drainage    Edema             Results for orders placed or performed in visit on 23   POCT urine dip   Result Value Ref Range    POCT URINE PROTEIN Negative     GLUCOSE, UA Negative          Problem List        Digestive    GERD (gastroesophageal reflux disease)       Endocrine    PCOS (polycystic ovarian syndrome)       Respiratory    Mild intermittent asthma without complication    Overview     Inhalers PRN         Asthma    Overview     Inhalers PRN            Cardiovascular and Mediastinum    Chronic hypertension affecting pregnancy    Overview     No meds currently   Was previously on lisinopril and metoprolol - after weight loss no metoprolol   Stopped lisinopril 2023   Baseline labs normal, UPC undetectable   Aspirin recommended         Current Assessment & Plan     Home BP's generally 130's/80's, occasional 90's   Intermittent headaches - neurology recommending excedrin, reglan, diamox for h/o IIH  - helping   No signs/sx superimposed preeclampsia at this time          Migraine       Genitourinary    Interstitial cystitis    UTI (urinary tract infection) affecting pregnancy     Overview     Admitted at 23 weeks for presumed pyelonephritis although urine culture did not ultimately grow anything  Previous UCx with small colony counts of GBS/mixed contaminants   Keflex suppression remainder of pregnancy          Current Assessment & Plan     rx for keflex daily sent          BV (bacterial vaginosis)       Other    History of pulmonary embolism    Overview     This was unprovoked in the setting of oral contraceptive use  She had a thorough thrombophilia work-up which can be seen in her lab reports from August 18, 2022 which was negative for inherited or acquired thrombophilia  She is recommended to continue Lovenox prophylaxis throughout her pregnancy and 6 weeks postpartum         Family history of breast cancer    Overview     PGM @ 78         Mass of lower inner quadrant of left breast    Overview      US 3/2023: Previously noted area of probable fat necrosis in the 7 o'clock position of the left breast, 5 cm from the nipple has significantly decreased in size currently measuring 5 x 4 x 7 mm  6 month f/u scheduled 10/4/23         Supervision of high-risk pregnancy    Overview     ASA recommended  Flu vaccine:  completed  Covid vaccine:  completed X 5  NIPS/ MsAFP- negative           Current Assessment & Plan     tdap next visit  Midtrimester labs ordered  OB precautions reviewed         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    Overview     Patient is a carrier for CF, alpha thalassemia, Navneet Landis - had carrier screening with [facility]  Partner testing:  pending         25 weeks gestation of pregnancy    Depression affecting pregnancy    Overview     Stopped topomax, buspar, prozac with + pregnancy  Behavioral health referral sent         Medication exposure during first trimester of pregnancy    Overview     Stopped topomax with + pregnancy test         Obesity affecting pregnancy    Overview     Pre gestational BMI 30  Early glucola: 3/21 117         Group B streptococcal bacteriuria    Overview     + GBS in urine @ 14 weeks:    Will need to treat in labor         Primigravida of advanced maternal age in second trimester    Excessive weight gain during pregnancy in second trimester    Overview     49 lbs as of 25 weeks                               Kathrine Padilla MD  6/30/2023  11:06 AM

## 2023-06-30 NOTE — ASSESSMENT & PLAN NOTE
Home BP's generally 130's/80's, occasional 90's   Intermittent headaches - neurology recommending excedrin, reglan, diamox for h/o IIH  - helping   No signs/sx superimposed preeclampsia at this time

## 2023-07-06 ENCOUNTER — APPOINTMENT (OUTPATIENT)
Dept: PHYSICAL THERAPY | Facility: CLINIC | Age: 38
End: 2023-07-06
Payer: COMMERCIAL

## 2023-07-10 ENCOUNTER — APPOINTMENT (OUTPATIENT)
Dept: LAB | Facility: CLINIC | Age: 38
End: 2023-07-10
Payer: COMMERCIAL

## 2023-07-10 DIAGNOSIS — O99.810 IMPAIRED GLUCOSE TOLERANCE DURING PREGNANCY: Primary | ICD-10-CM

## 2023-07-10 DIAGNOSIS — O09.92 SUPERVISION OF HIGH RISK PREGNANCY IN SECOND TRIMESTER: ICD-10-CM

## 2023-07-10 DIAGNOSIS — G93.2 IIH (IDIOPATHIC INTRACRANIAL HYPERTENSION): ICD-10-CM

## 2023-07-10 LAB
ALBUMIN SERPL BCP-MCNC: 3 G/DL (ref 3.5–5)
ALP SERPL-CCNC: 43 U/L (ref 46–116)
ALT SERPL W P-5'-P-CCNC: 19 U/L (ref 12–78)
ANION GAP SERPL CALCULATED.3IONS-SCNC: 6 MMOL/L
AST SERPL W P-5'-P-CCNC: 14 U/L (ref 5–45)
BILIRUB SERPL-MCNC: 0.22 MG/DL (ref 0.2–1)
BUN SERPL-MCNC: 9 MG/DL (ref 5–25)
CALCIUM ALBUM COR SERPL-MCNC: 10 MG/DL (ref 8.3–10.1)
CALCIUM SERPL-MCNC: 9.2 MG/DL (ref 8.3–10.1)
CHLORIDE SERPL-SCNC: 111 MMOL/L (ref 96–108)
CO2 SERPL-SCNC: 23 MMOL/L (ref 21–32)
CREAT SERPL-MCNC: 0.76 MG/DL (ref 0.6–1.3)
ERYTHROCYTE [DISTWIDTH] IN BLOOD BY AUTOMATED COUNT: 13.2 % (ref 11.6–15.1)
GFR SERPL CREATININE-BSD FRML MDRD: 99 ML/MIN/1.73SQ M
GLUCOSE 1H P 50 G GLC PO SERPL-MCNC: 153 MG/DL (ref 40–134)
GLUCOSE P FAST SERPL-MCNC: 140 MG/DL (ref 65–99)
HCT VFR BLD AUTO: 37 % (ref 34.8–46.1)
HGB BLD-MCNC: 12.3 G/DL (ref 11.5–15.4)
MCH RBC QN AUTO: 28.3 PG (ref 26.8–34.3)
MCHC RBC AUTO-ENTMCNC: 33.2 G/DL (ref 31.4–37.4)
MCV RBC AUTO: 85 FL (ref 82–98)
PLATELET # BLD AUTO: 248 THOUSANDS/UL (ref 149–390)
PMV BLD AUTO: 11.1 FL (ref 8.9–12.7)
POTASSIUM SERPL-SCNC: 3.3 MMOL/L (ref 3.5–5.3)
PROT SERPL-MCNC: 7 G/DL (ref 6.4–8.4)
RBC # BLD AUTO: 4.34 MILLION/UL (ref 3.81–5.12)
SODIUM SERPL-SCNC: 140 MMOL/L (ref 135–147)
TREPONEMA PALLIDUM IGG+IGM AB [PRESENCE] IN SERUM OR PLASMA BY IMMUNOASSAY: NORMAL
WBC # BLD AUTO: 10.15 THOUSAND/UL (ref 4.31–10.16)

## 2023-07-10 PROCEDURE — 36415 COLL VENOUS BLD VENIPUNCTURE: CPT

## 2023-07-10 PROCEDURE — 82950 GLUCOSE TEST: CPT

## 2023-07-10 PROCEDURE — 86780 TREPONEMA PALLIDUM: CPT

## 2023-07-10 PROCEDURE — 85027 COMPLETE CBC AUTOMATED: CPT

## 2023-07-10 PROCEDURE — 80053 COMPREHEN METABOLIC PANEL: CPT

## 2023-07-11 ENCOUNTER — CLINICAL SUPPORT (OUTPATIENT)
Dept: POSTPARTUM | Facility: CLINIC | Age: 38
End: 2023-07-11

## 2023-07-11 DIAGNOSIS — Z32.2 ENCOUNTER FOR CHILDBIRTH INSTRUCTION: Primary | ICD-10-CM

## 2023-07-13 ENCOUNTER — OFFICE VISIT (OUTPATIENT)
Dept: PHYSICAL THERAPY | Facility: CLINIC | Age: 38
End: 2023-07-13
Payer: COMMERCIAL

## 2023-07-13 DIAGNOSIS — M54.50 LOW BACK PAIN DURING PREGNANCY IN SECOND TRIMESTER: Primary | ICD-10-CM

## 2023-07-13 DIAGNOSIS — O26.892 LOW BACK PAIN DURING PREGNANCY IN SECOND TRIMESTER: Primary | ICD-10-CM

## 2023-07-13 PROCEDURE — 97530 THERAPEUTIC ACTIVITIES: CPT | Performed by: PHYSICAL THERAPIST

## 2023-07-13 PROCEDURE — 97140 MANUAL THERAPY 1/> REGIONS: CPT | Performed by: PHYSICAL THERAPIST

## 2023-07-13 NOTE — PROGRESS NOTES
Daily Note     Today's date: 2023  Patient name: Marek Rivers  : 1985  MRN: [id number]  Referring provider: Soo Berger PA-C  Dx:   Encounter Diagnosis     ICD-10-CM    1. Low back pain during pregnancy in second trimester  O26.892     M54.50                      Subjective: Patient notes that her low back pain is reduced however pulling symptoms into the left lower quadrant is noted. Objective: See treatment diary below      Assessment: Tolerated treatment well. Patient would benefit from continued PT. Patient responded well to L/S manual soft tissue mobilization. Patient manual lumbar traction was completed with good tolerance as well. Patient was educated with self care techniques to help remaining LBP symptoms, management of abdominal discomfort with a belt, appropriate sidely strengthening exercise. Plan: Continue per plan of care. Precautions: PREGNANCY / anti-coag therapy (lovenox/ baby aspirin).       Daily Treatment Diary:      Initial Evaluation Date: 23  Compliance    Visit Number 11 12  3  4  5  6  7  8  9  10   Re-Eval  - -  -  -  -      Y      Foto Captured - -  -  -  -                        Manual                     Stretch IR/ Piriformis 30" x5 Manual lumbar traction with BLE pulls 10 min   30"x5 IR/Piriformis/ SKTC/ HS  30"x5IR/ Piriformis/ SKTC/ HS 15 min  30" x3 IR/ Piriformis/ SKTC/ HS  30"x3 IR/ piriformis/ SKTC HS 15 min  30" x3 IR/ piriformis/ SKTC/ HS 15 min  30" x3 IR/ piriformis/ SKTC/ HS 15 min  30" x2 IR/ piriformis/ HS   STM 10 min 15 min   15 min  10 min  10 min  10 min  10 min with addition of IASTM  15 min with IASTM/ tiger tail  10 min   Kinesio tape -        5 min 2 I strips on erector spinea  5 min space correction  5 min 2 I strips with 1 space correction strip  held due to skin  -  -   Ther-Ex                      LTR 5" x10 5"x10  5" x10  5" x10  5" x10  5" x10  5" x10  5" x10   TA set with PPT     5" x15  5" x15  5" x15  5" x15  5" x15  5"x15  5" x15   TA with march               x10  x10   Self IR stretch 10" x5        10" x5  10" x5  10" x5  10" x5  10" x5  10"x5   Supine clamshell     --->               Supine add     --->               Standing hip flexor stretch 20" x3    ---->               BKFO      x10    -           Cat cow      5"x10    -           doorwar pectoral stretch 10"x5         10" x5   Open books 10" x5         10" x5   Seated t/s extension          --->                Lucrecia pose      5" x10    -  p-ball roll out x2  -       Neuro Re-Ed                                                                                                Ther-Act              Patient ed   15 min healthy postures/ sidely ex for HEP                                              Modalities                      HP/ CP HP x10 min HP in left sidely x10 min  HP x10 min HP x10 min HP x10 min HP x10 min  HP x10 min HP 10 min                                             Access Code: [email or web address]  URL: [email or web address]  Date: 05/01/2023  Prepared by: Megan Hall    Exercises  - Caregiver PROM Hip Internal Rotation  - 1 x daily - 7 x weekly - 1 sets - 5 reps - 20-30 hold  - Self IR stretch  - 1 x daily - 7 x weekly - 1 sets - 10 reps - 10 hold  - Supine Piriformis Stretch with Foot on Ground  - 1 x daily - 7 x weekly - 1 sets - 5 reps - 20-30 hold

## 2023-07-16 PROBLEM — N76.0 BV (BACTERIAL VAGINOSIS): Status: RESOLVED | Noted: 2023-06-17 | Resolved: 2023-07-16

## 2023-07-16 PROBLEM — N39.0 UTI (URINARY TRACT INFECTION): Status: RESOLVED | Noted: 2023-06-16 | Resolved: 2023-07-16

## 2023-07-16 PROBLEM — B96.89 BV (BACTERIAL VAGINOSIS): Status: RESOLVED | Noted: 2023-06-17 | Resolved: 2023-07-16

## 2023-07-16 PROBLEM — O09.513 PRIMIGRAVIDA OF ADVANCED MATERNAL AGE IN THIRD TRIMESTER: Status: ACTIVE | Noted: 2023-05-19

## 2023-07-16 PROBLEM — O26.03 EXCESSIVE WEIGHT GAIN DURING PREGNANCY IN THIRD TRIMESTER: Status: ACTIVE | Noted: 2023-06-30

## 2023-07-16 PROBLEM — Z3A.28 28 WEEKS GESTATION OF PREGNANCY: Status: ACTIVE | Noted: 2023-03-13

## 2023-07-16 NOTE — PATIENT INSTRUCTIONS
Pregnancy at 27 to 30 [place]:   You may notice new symptoms such as shortness of breath, heartburn, or swelling of your ankles and feet. You may also have trouble sleeping or contractions. DISCHARGE INSTRUCTIONS:   Return to the emergency department if:   You develop a severe headache that does not go away. You have new or increased vision changes, such as blurred or spotted vision. You have new or increased swelling in your face or hands. You have vaginal spotting or bleeding. Your water broke or you feel warm water gushing or trickling from your vagina. Call your doctor or obstetrician if:   You have more than 5 contractions in 1 hour. You notice any changes in your baby's movements. You have abdominal cramps, pressure, or tightening. You have a change in vaginal discharge. You have chills or a fever. You have vaginal itching, burning, or pain. You have yellow, green, white, or foul-smelling vaginal discharge. You have pain or burning when you urinate, less urine than usual, or pink or bloody urine. You have questions or concerns about your condition or care. How to care for yourself at this stage of your pregnancy:       Eat a variety of healthy foods. Healthy foods include fruits, vegetables, whole-grain breads, low-fat dairy foods, beans, lean meats, and fish. Drink liquids as directed. Ask how much liquid to drink each day and which liquids are best for you. Limit caffeine to less than 200 milligrams each day. Limit your intake of fish to 2 servings each week. Choose fish low in mercury such as canned light tuna, shrimp, salmon, cod, or tilapia. Do not  eat fish high in mercury such as swordfish, tilefish, elvis mackerel, and shark. Manage heartburn  by eating 4 or 5 small meals each day instead of large meals. Avoid spicy food. Manage swelling  by lying down and putting your feet up. Take prenatal vitamins as directed. Your need for certain vitamins and minerals, such as folic acid, increases during pregnancy. Prenatal vitamins provide some of the extra vitamins and minerals you need. Prenatal vitamins may also help to decrease the risk of certain birth defects. Talk to your healthcare provider about exercise. Moderate exercise can help you stay fit. Your healthcare provider will help you plan an exercise program that is safe for you during pregnancy. Do not smoke. Smoking increases your risk of a miscarriage and other health problems during your pregnancy. Smoking can cause your baby to be born too early or weigh less at birth. Ask your healthcare provider for information if you need help quitting. Do not drink alcohol. Alcohol passes from your body to your baby through the placenta. It can affect your baby's brain development and cause fetal alcohol syndrome (FAS). FAS is a group of conditions that causes mental, behavior, and growth problems. Talk to your healthcare provider before you take any medicines. Many medicines may harm your baby if you take them when you are pregnant. Do not take any medicines, vitamins, herbs, or supplements without first talking to your healthcare provider. Never use illegal or street drugs (such as marijuana or cocaine) while you are pregnant. Safety tips during pregnancy:   Avoid hot tubs and saunas. Do not use a hot tub or sauna while you are pregnant, especially during your first trimester. Hot tubs and saunas may raise your baby's temperature and increase the risk of birth defects. Avoid toxoplasmosis. This is an infection caused by eating raw meat or being around infected cat feces. It can cause birth defects, miscarriages, and other problems. Wash your hands after you touch raw meat. Make sure any meat is well-cooked before you eat it. Avoid raw eggs and unpasteurized milk. Use gloves or ask someone else to clean your cat's litter box while you are pregnant. Changes that are happening with your baby:  By 30 weeks, your baby may weigh more than 3 pounds. Your baby may be about 11 inches long from the top of the head to the rump (baby's bottom). Your baby's eyes open and close now. Your baby's kicks and movements are more forceful at this time. What you need to know about prenatal care: Your healthcare provider will check your blood pressure and weight. You may also need the following:  Blood tests  may be done to check for anemia or blood type. A urine test  may also be done to check for sugar and protein. These can be signs of gestational diabetes or infection. Protein in your urine may also be a sign of preeclampsia. Preeclampsia is a condition that can develop during week 20 or later of your pregnancy. It causes high blood pressure, and it can cause problems with your kidneys and other organs. A Tdap vaccine and flu vaccine  may be recommended by your healthcare provider. A gestational diabetes screen  may be done. Your healthcare provider may order either a 1-step or 2-step oral glucose tolerance test (OGTT). 1-step OGTT:  Your blood sugar level will be tested after you have not eaten for 8 hours (fasting). You will then be given a glucose drink. Your level will be tested again 1 hour and 2 hours after you finish the drink. 2-step OGTT:  You do not have to fast for the first part of the test. You will have the glucose drink at any time of day. Your blood sugar level will be checked 1 hour later. If your blood sugar is higher than a certain level, another test will be ordered. You will fast and your blood sugar level will be tested. You will have the glucose drink. Your blood will be tested again 1 hour, 2 hours, and 3 hours after you finish the glucose drink. Fundal height  is a measurement of your uterus to check your baby's growth. This number is usually the same as the number of weeks that you have been pregnant.  Your healthcare provider may also check your baby's position. Your baby's heart rate  will be checked. Follow up with your doctor or obstetrician as directed:  Write down your questions so you remember to ask them during your visits. © Copyright [employer] 2022 Information is for End User's use only and may not be sold, redistributed or otherwise used for commercial purposes. The above information is an  only. It is not intended as medical advice for individual conditions or treatments. Talk to your doctor, nurse or pharmacist before following any medical regimen to see if it is safe and effective for you.

## 2023-07-17 ENCOUNTER — ULTRASOUND (OUTPATIENT)
Dept: PERINATAL CARE | Facility: CLINIC | Age: 38
End: 2023-07-17
Payer: COMMERCIAL

## 2023-07-17 ENCOUNTER — ROUTINE PRENATAL (OUTPATIENT)
Dept: OBGYN CLINIC | Facility: CLINIC | Age: 38
End: 2023-07-17
Payer: COMMERCIAL

## 2023-07-17 VITALS
DIASTOLIC BLOOD PRESSURE: 70 MMHG | WEIGHT: 253.8 LBS | SYSTOLIC BLOOD PRESSURE: 136 MMHG | BODY MASS INDEX: 35.4 KG/M2 | HEART RATE: 75 BPM | TEMPERATURE: 97.8 F

## 2023-07-17 VITALS
HEIGHT: 71 IN | HEART RATE: 96 BPM | WEIGHT: 252.6 LBS | BODY MASS INDEX: 35.36 KG/M2 | SYSTOLIC BLOOD PRESSURE: 128 MMHG | DIASTOLIC BLOOD PRESSURE: 78 MMHG

## 2023-07-17 DIAGNOSIS — Z86.711 HISTORY OF PULMONARY EMBOLISM: ICD-10-CM

## 2023-07-17 DIAGNOSIS — Z3A.28 28 WEEKS GESTATION OF PREGNANCY: ICD-10-CM

## 2023-07-17 DIAGNOSIS — Z36.89 ENCOUNTER FOR ULTRASOUND TO CHECK FETAL GROWTH: ICD-10-CM

## 2023-07-17 DIAGNOSIS — Z34.93 THIRD TRIMESTER PREGNANCY: Primary | ICD-10-CM

## 2023-07-17 DIAGNOSIS — O99.810 IMPAIRED GLUCOSE TOLERANCE DURING PREGNANCY: ICD-10-CM

## 2023-07-17 DIAGNOSIS — R39.9 UTI SYMPTOMS: ICD-10-CM

## 2023-07-17 DIAGNOSIS — O10.919 CHRONIC HYPERTENSION AFFECTING PREGNANCY: ICD-10-CM

## 2023-07-17 DIAGNOSIS — Z36.2 ENCOUNTER FOR FOLLOW-UP ULTRASOUND OF FETAL ANATOMY: ICD-10-CM

## 2023-07-17 DIAGNOSIS — O09.891 MEDICATION EXPOSURE DURING FIRST TRIMESTER OF PREGNANCY: ICD-10-CM

## 2023-07-17 DIAGNOSIS — O09.513 PRIMIGRAVIDA OF ADVANCED MATERNAL AGE IN THIRD TRIMESTER: Primary | ICD-10-CM

## 2023-07-17 DIAGNOSIS — Z23 NEED FOR TDAP VACCINATION: ICD-10-CM

## 2023-07-17 DIAGNOSIS — Z14.8 CARRIER OF GENETIC DISORDER: ICD-10-CM

## 2023-07-17 DIAGNOSIS — O09.93 SUPERVISION OF HIGH RISK PREGNANCY IN THIRD TRIMESTER: ICD-10-CM

## 2023-07-17 DIAGNOSIS — O09.513 PRIMIGRAVIDA OF ADVANCED MATERNAL AGE IN THIRD TRIMESTER: ICD-10-CM

## 2023-07-17 DIAGNOSIS — R82.71 GROUP B STREPTOCOCCAL BACTERIURIA: ICD-10-CM

## 2023-07-17 DIAGNOSIS — O26.03 EXCESSIVE WEIGHT GAIN DURING PREGNANCY IN THIRD TRIMESTER: ICD-10-CM

## 2023-07-17 LAB
BACTERIA UR QL AUTO: ABNORMAL /HPF
BILIRUB UR QL STRIP: NEGATIVE
CLARITY UR: CLEAR
COLOR UR: YELLOW
GLUCOSE UR STRIP-MCNC: NEGATIVE MG/DL
HGB UR QL STRIP.AUTO: NEGATIVE
KETONES UR STRIP-MCNC: NEGATIVE MG/DL
LEUKOCYTE ESTERASE UR QL STRIP: NEGATIVE
NITRITE UR QL STRIP: NEGATIVE
NON-SQ EPI CELLS URNS QL MICRO: ABNORMAL /HPF
PH UR STRIP.AUTO: 7 [PH]
PROT UR STRIP-MCNC: NEGATIVE MG/DL
RBC #/AREA URNS AUTO: ABNORMAL /HPF
SL AMB  POCT GLUCOSE, UA: NORMAL
SL AMB LEUKOCYTE ESTERASE,UA: NORMAL
SL AMB POCT BILIRUBIN,UA: NORMAL
SL AMB POCT BLOOD,UA: NORMAL
SL AMB POCT CLARITY,UA: CLEAR
SL AMB POCT COLOR,UA: YELLOW
SL AMB POCT KETONES,UA: NORMAL
SL AMB POCT NITRITE,UA: NORMAL
SL AMB POCT PH,UA: 7
SL AMB POCT SPECIFIC GRAVITY,UA: 1
SL AMB POCT URINE PROTEIN: NORMAL
SL AMB POCT UROBILINOGEN: 0.2
SP GR UR STRIP.AUTO: 1.01 (ref 1–1.03)
UROBILINOGEN UR STRIP-ACNC: <2 MG/DL
WBC #/AREA URNS AUTO: ABNORMAL /HPF

## 2023-07-17 PROCEDURE — 99213 OFFICE O/P EST LOW 20 MIN: CPT | Performed by: STUDENT IN AN ORGANIZED HEALTH CARE EDUCATION/TRAINING PROGRAM

## 2023-07-17 PROCEDURE — PNV: Performed by: OBSTETRICS & GYNECOLOGY

## 2023-07-17 PROCEDURE — 76816 OB US FOLLOW-UP PER FETUS: CPT | Performed by: STUDENT IN AN ORGANIZED HEALTH CARE EDUCATION/TRAINING PROGRAM

## 2023-07-17 PROCEDURE — 87086 URINE CULTURE/COLONY COUNT: CPT | Performed by: OBSTETRICS & GYNECOLOGY

## 2023-07-17 PROCEDURE — 81001 URINALYSIS AUTO W/SCOPE: CPT | Performed by: OBSTETRICS & GYNECOLOGY

## 2023-07-17 PROCEDURE — 81002 URINALYSIS NONAUTO W/O SCOPE: CPT | Performed by: OBSTETRICS & GYNECOLOGY

## 2023-07-17 PROCEDURE — 90715 TDAP VACCINE 7 YRS/> IM: CPT | Performed by: OBSTETRICS & GYNECOLOGY

## 2023-07-17 PROCEDURE — 90471 IMMUNIZATION ADMIN: CPT | Performed by: OBSTETRICS & GYNECOLOGY

## 2023-07-17 RX ORDER — ACETAZOLAMIDE 125 MG/1
125 TABLET ORAL DAILY
COMMUNITY

## 2023-07-17 NOTE — PROGRESS NOTES
[id number]  [facility]: Ms. Jeremiah Tapia was seen today for fetal growth and followup missed anatomy ultrasound. See ultrasound report under "OB Procedures" tab. The time spent on this established patient on the encounter date included 5 minutes previsit service time reviewing records and precharting, 5 minutes face-to-face service time counseling regarding results and coordinating care, and  5 minutes charting, totalling 15 minutes. Please don't hesitate to contact our office with any concerns or questions.   -Jonathan Jaime MD Initiate Treatment: Mupirocin ointment bid Detail Level: Zone Render In Strict Bullet Format?: No Plan: Due to pts allergies to some antibiotics we didnât prescribe an rx of one today

## 2023-07-18 ENCOUNTER — CLINICAL SUPPORT (OUTPATIENT)
Dept: POSTPARTUM | Facility: CLINIC | Age: 38
End: 2023-07-18

## 2023-07-18 DIAGNOSIS — Z32.2 ENCOUNTER FOR CHILDBIRTH INSTRUCTION: Primary | ICD-10-CM

## 2023-07-18 LAB — BACTERIA UR CULT: NORMAL

## 2023-07-19 ENCOUNTER — APPOINTMENT (OUTPATIENT)
Dept: LAB | Facility: HOSPITAL | Age: 38
End: 2023-07-19
Payer: COMMERCIAL

## 2023-07-19 DIAGNOSIS — O99.810 IMPAIRED GLUCOSE TOLERANCE DURING PREGNANCY: ICD-10-CM

## 2023-07-19 LAB
GLUCOSE 1H P 100 G GLC PO SERPL-MCNC: 156 MG/DL (ref 65–179)
GLUCOSE 2H P 100 G GLC PO SERPL-MCNC: 96 MG/DL (ref 65–154)
GLUCOSE 3H P 100 G GLC PO SERPL-MCNC: 41 MG/DL (ref 65–139)
GLUCOSE P FAST SERPL-MCNC: 80 MG/DL (ref 65–94)

## 2023-07-19 PROCEDURE — 82952 GTT-ADDED SAMPLES: CPT

## 2023-07-19 PROCEDURE — 36415 COLL VENOUS BLD VENIPUNCTURE: CPT

## 2023-07-19 PROCEDURE — 82951 GLUCOSE TOLERANCE TEST (GTT): CPT

## 2023-07-21 ENCOUNTER — APPOINTMENT (OUTPATIENT)
Dept: PHYSICAL THERAPY | Facility: CLINIC | Age: 38
End: 2023-07-21
Payer: COMMERCIAL

## 2023-07-25 ENCOUNTER — CLINICAL SUPPORT (OUTPATIENT)
Dept: POSTPARTUM | Facility: CLINIC | Age: 38
End: 2023-07-25

## 2023-07-25 DIAGNOSIS — Z32.2 ENCOUNTER FOR CHILDBIRTH INSTRUCTION: Primary | ICD-10-CM

## 2023-07-26 DIAGNOSIS — J30.89 ALLERGIC RHINITIS DUE TO OTHER ALLERGIC TRIGGER, UNSPECIFIED SEASONALITY: ICD-10-CM

## 2023-07-26 RX ORDER — FLUTICASONE PROPIONATE 50 MCG
SPRAY, SUSPENSION (ML) NASAL
Qty: 24 ML | Refills: 3 | Status: SHIPPED | OUTPATIENT
Start: 2023-07-26

## 2023-07-27 ENCOUNTER — TELEPHONE (OUTPATIENT)
Dept: NEUROLOGY | Facility: CLINIC | Age: 38
End: 2023-07-27

## 2023-07-27 ENCOUNTER — APPOINTMENT (OUTPATIENT)
Dept: PHYSICAL THERAPY | Facility: CLINIC | Age: 38
End: 2023-07-27
Payer: COMMERCIAL

## 2023-07-27 NOTE — TELEPHONE ENCOUNTER
Called patient and left voicemail to confirm their upcoming appointment with Dr. Chele Zamora. Informed patient about arriving in the [place] location 15 minutes prior to appointment to get checked in and go over chart.

## 2023-07-28 NOTE — TELEPHONE ENCOUNTER
Hello,     Patient has called back and has re-scheduled due to work and location , she will be to [facility]. I offered VV she declined for this date and time. Also re-scheduled for 09/20/2023, 3 pm, OVS, CTr VL, Chaitanya Alvarez and is asking if that appointment can be a VV?     Thank you in advance,     Madie Mills

## 2023-07-29 PROBLEM — Z3A.30 30 WEEKS GESTATION OF PREGNANCY: Status: ACTIVE | Noted: 2023-03-13

## 2023-07-30 NOTE — PATIENT INSTRUCTIONS
Pregnancy at 27 to 30 [place]:   You may notice new symptoms such as shortness of breath, heartburn, or swelling of your ankles and feet. You may also have trouble sleeping or contractions. DISCHARGE INSTRUCTIONS:   Return to the emergency department if:   You develop a severe headache that does not go away. You have new or increased vision changes, such as blurred or spotted vision. You have new or increased swelling in your face or hands. You have vaginal spotting or bleeding. Your water broke or you feel warm water gushing or trickling from your vagina. Call your doctor or obstetrician if:   You have more than 5 contractions in 1 hour. You notice any changes in your baby's movements. You have abdominal cramps, pressure, or tightening. You have a change in vaginal discharge. You have chills or a fever. You have vaginal itching, burning, or pain. You have yellow, green, white, or foul-smelling vaginal discharge. You have pain or burning when you urinate, less urine than usual, or pink or bloody urine. You have questions or concerns about your condition or care. How to care for yourself at this stage of your pregnancy:       Eat a variety of healthy foods. Healthy foods include fruits, vegetables, whole-grain breads, low-fat dairy foods, beans, lean meats, and fish. Drink liquids as directed. Ask how much liquid to drink each day and which liquids are best for you. Limit caffeine to less than 200 milligrams each day. Limit your intake of fish to 2 servings each week. Choose fish low in mercury such as canned light tuna, shrimp, salmon, cod, or tilapia. Do not  eat fish high in mercury such as swordfish, tilefish, elvis mackerel, and shark. Manage heartburn  by eating 4 or 5 small meals each day instead of large meals. Avoid spicy food. Manage swelling  by lying down and putting your feet up. Take prenatal vitamins as directed. Your need for certain vitamins and minerals, such as folic acid, increases during pregnancy. Prenatal vitamins provide some of the extra vitamins and minerals you need. Prenatal vitamins may also help to decrease the risk of certain birth defects. Talk to your healthcare provider about exercise. Moderate exercise can help you stay fit. Your healthcare provider will help you plan an exercise program that is safe for you during pregnancy. Do not smoke. Smoking increases your risk of a miscarriage and other health problems during your pregnancy. Smoking can cause your baby to be born too early or weigh less at birth. Ask your healthcare provider for information if you need help quitting. Do not drink alcohol. Alcohol passes from your body to your baby through the placenta. It can affect your baby's brain development and cause fetal alcohol syndrome (FAS). FAS is a group of conditions that causes mental, behavior, and growth problems. Talk to your healthcare provider before you take any medicines. Many medicines may harm your baby if you take them when you are pregnant. Do not take any medicines, vitamins, herbs, or supplements without first talking to your healthcare provider. Never use illegal or street drugs (such as marijuana or cocaine) while you are pregnant. Safety tips during pregnancy:   Avoid hot tubs and saunas. Do not use a hot tub or sauna while you are pregnant, especially during your first trimester. Hot tubs and saunas may raise your baby's temperature and increase the risk of birth defects. Avoid toxoplasmosis. This is an infection caused by eating raw meat or being around infected cat feces. It can cause birth defects, miscarriages, and other problems. Wash your hands after you touch raw meat. Make sure any meat is well-cooked before you eat it. Avoid raw eggs and unpasteurized milk. Use gloves or ask someone else to clean your cat's litter box while you are pregnant. Changes that are happening with your baby:  By 30 weeks, your baby may weigh more than 3 pounds. Your baby may be about 11 inches long from the top of the head to the rump (baby's bottom). Your baby's eyes open and close now. Your baby's kicks and movements are more forceful at this time. What you need to know about prenatal care: Your healthcare provider will check your blood pressure and weight. You may also need the following:  Blood tests  may be done to check for anemia or blood type. A urine test  may also be done to check for sugar and protein. These can be signs of gestational diabetes or infection. Protein in your urine may also be a sign of preeclampsia. Preeclampsia is a condition that can develop during week 20 or later of your pregnancy. It causes high blood pressure, and it can cause problems with your kidneys and other organs. A Tdap vaccine and flu vaccine  may be recommended by your healthcare provider. A gestational diabetes screen  may be done. Your healthcare provider may order either a 1-step or 2-step oral glucose tolerance test (OGTT). 1-step OGTT:  Your blood sugar level will be tested after you have not eaten for 8 hours (fasting). You will then be given a glucose drink. Your level will be tested again 1 hour and 2 hours after you finish the drink. 2-step OGTT:  You do not have to fast for the first part of the test. You will have the glucose drink at any time of day. Your blood sugar level will be checked 1 hour later. If your blood sugar is higher than a certain level, another test will be ordered. You will fast and your blood sugar level will be tested. You will have the glucose drink. Your blood will be tested again 1 hour, 2 hours, and 3 hours after you finish the glucose drink. Fundal height  is a measurement of your uterus to check your baby's growth. This number is usually the same as the number of weeks that you have been pregnant.  Your healthcare provider may also check your baby's position. Your baby's heart rate  will be checked. Follow up with your doctor or obstetrician as directed:  Write down your questions so you remember to ask them during your visits. © Copyright Yakima Esquivel 2022 Information is for End User's use only and may not be sold, redistributed or otherwise used for commercial purposes. The above information is an  only. It is not intended as medical advice for individual conditions or treatments. Talk to your doctor, nurse or pharmacist before following any medical regimen to see if it is safe and effective for you.

## 2023-07-30 NOTE — PROGRESS NOTES
OB/GYN  PN Visit  Dory Milan  [id number]  8/3/2023  1:06 PM  Dr. Yolanda Doll MD    S: 43 y.o.  30   here for PN visit. Chief Complaint   Patient presents with   • Routine Prenatal Visit     Pt thinks she has BV again - has discharge, itching, irritation, using Lotrisone cream. Pt with c/o pelvic pressure on left side. Frequently waking up to use the restroom. OB complaints:  Contractions: no  Leakage: no  Bleeding: no  Fetal movement: yes      O:  /84 (BP Location: Right arm)   Pulse (!) 106   Wt 115 kg (254 lb 6.4 oz)   LMP 2023   BMI 35.48 kg/m²       Review of Systems   Constitutional: Negative. HENT: Negative. Eyes: Negative. Respiratory: Negative. Cardiovascular: Negative. Gastrointestinal: Negative. Endocrine: Negative. Genitourinary:        As noted in HPI   Musculoskeletal: Negative. Skin: Negative. Allergic/Immunologic: Negative. Neurological: Negative. Hematological: Negative. Psychiatric/Behavioral: Negative. Physical Exam  Constitutional:       General: She is not in acute distress. Appearance: She is well-developed. Abdominal:      Palpations: Abdomen is soft. Tenderness: There is no abdominal tenderness. There is no guarding. Neurological:      Mental Status: She is alert and oriented to person, place, and time. Skin:     General: Skin is warm and dry.    Psychiatric:         Behavior: Behavior normal.             Pregravid Weight/BMI: 91.6 kg (202 lb) (BMI 28.19)  Current Weight: 115 kg (254 lb 6.4 oz)   Total Weight Gain: 23.8 kg (52 lb 6.4 oz)   Pre-Sania Vitals    Flowsheet Row Most Recent Value   Prenatal Assessment    Fetal Heart Rate 140   Fundal Height (cm) 31 cm   Movement Present   Presentation Vertex   Prenatal Vitals    Blood Pressure 132/84   Weight - Scale 115 kg (254 lb 6.4 oz)   Urine Albumin/Glucose    Dilation/Effacement/Station    Cervical Dilation 0   Cervical Effacement 0 Fetal Station -3   Vaginal Drainage    Edema            Problem List        Digestive    GERD (gastroesophageal reflux disease)       Endocrine    PCOS (polycystic ovarian syndrome)    Impaired glucose tolerance during pregnancy    Overview     glucola elevated at 27 weeks - 3 hr GTT normal             Respiratory    Mild intermittent asthma without complication    Overview     Inhalers PRN         Asthma    Overview     Inhalers PRN            Cardiovascular and Mediastinum    Chronic hypertension affecting pregnancy    Overview     No meds currently   Was previously on lisinopril and metoprolol - after weight loss no metoprolol   Stopped lisinopril January 2023   Baseline labs normal, UPC undetectable   Aspirin recommended         Migraine       Genitourinary    Interstitial cystitis       Other    History of pulmonary embolism    Overview     This was unprovoked in the setting of oral contraceptive use. She had a thorough thrombophilia work-up which can be seen in her lab reports from August 18, 2022 which was negative for inherited or acquired thrombophilia. She is recommended to continue Lovenox prophylaxis throughout her pregnancy and 6 weeks postpartum         Family history of breast cancer    Overview     PGM @ 78         Mass of lower inner quadrant of left breast    Overview      US 3/2023: Previously noted area of probable fat necrosis in the 7 o'clock position of the left breast, 5 cm from the nipple has significantly decreased in size currently measuring 5 x 4 x 7 mm.   6 month f/u scheduled 10/4/23         Supervision of high-risk pregnancy    Overview     ASA recommended  Flu vaccine:  completed  Covid vaccine:  completed X 5  NIPS/ MsAFP- negative           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    Overview     Patient is a carrier for CF, alpha thalassemia, Estephanie Lacy - had carrier screening with Suzy Benz  Partner testing:  pending         30 weeks gestation of pregnancy    Depression affecting pregnancy    Overview     Stopped topomax, buspar, prozac with + pregnancy  Behavioral health referral sent         Medication exposure during first trimester of pregnancy    Overview     Stopped topomax with + pregnancy test         Obesity affecting pregnancy    Overview     Pre gestational BMI 30  Early glucola: 3/21 117         Group B streptococcal bacteriuria    Overview     + GBS in urine @ 14 weeks: Will need to treat in labor         Primigravida of advanced maternal age in third trimester    Excessive weight gain during pregnancy in third trimester    Overview     49 lbs as of 25 weeks         Other Visit Diagnoses     Third trimester pregnancy    -  Primary    BV (bacterial vaginosis)        UTI symptoms        Recurrent vaginitis        Swelling of lower extremity during pregnancy, antepartum, third trimester             Swelling noted bilateral lower extremity no tenderness or redness repeat preeclampsia labs were ordered. Blood pressure is normal today      Molecular vaginal panel was ordered due to recurrent vaginitis. Flagyl was ordered for bacterial vaginosis. Urine culture and urinalysis were also sent. Continue Lotrisone as needed. She is undergoing physical therapy for her low back pain and pelvic pain    Growth ultrasound is scheduled for 2 weeks.   Follow-up in 2 weeks    Refills on Lovenox were provided      Future Appointments   Date Time Provider [facility] [address]   2023 11:15 AM Allen Bloom MD Complete  [facility]   2023 11:30 AM  [facility]   2023  9:00 AM Allen Bloom MD COMP [facility]   2023  3:00 PM Kim Stewart MD NEURO CTR  [facility]   10/4/2023  9:00 AM OW [facility] M[facility] 1 [facility] US [facility]               Rivera Tang MD  8/3/2023  1:06 PM

## 2023-08-03 ENCOUNTER — ROUTINE PRENATAL (OUTPATIENT)
Dept: OBGYN CLINIC | Facility: CLINIC | Age: 38
End: 2023-08-03
Payer: COMMERCIAL

## 2023-08-03 VITALS
DIASTOLIC BLOOD PRESSURE: 84 MMHG | HEART RATE: 106 BPM | SYSTOLIC BLOOD PRESSURE: 132 MMHG | WEIGHT: 254.4 LBS | BODY MASS INDEX: 35.48 KG/M2

## 2023-08-03 DIAGNOSIS — N76.0 BV (BACTERIAL VAGINOSIS): ICD-10-CM

## 2023-08-03 DIAGNOSIS — Z14.8 CARRIER OF GENETIC DISORDER: ICD-10-CM

## 2023-08-03 DIAGNOSIS — O09.891 MEDICATION EXPOSURE DURING FIRST TRIMESTER OF PREGNANCY: ICD-10-CM

## 2023-08-03 DIAGNOSIS — O26.03 EXCESSIVE WEIGHT GAIN DURING PREGNANCY IN THIRD TRIMESTER: ICD-10-CM

## 2023-08-03 DIAGNOSIS — B96.89 BV (BACTERIAL VAGINOSIS): ICD-10-CM

## 2023-08-03 DIAGNOSIS — Z86.711 HISTORY OF PULMONARY EMBOLISM: ICD-10-CM

## 2023-08-03 DIAGNOSIS — R82.71 GROUP B STREPTOCOCCAL BACTERIURIA: ICD-10-CM

## 2023-08-03 DIAGNOSIS — N76.0 RECURRENT VAGINITIS: ICD-10-CM

## 2023-08-03 DIAGNOSIS — Z34.93 THIRD TRIMESTER PREGNANCY: Primary | ICD-10-CM

## 2023-08-03 DIAGNOSIS — O99.213 OBESITY AFFECTING PREGNANCY IN THIRD TRIMESTER: ICD-10-CM

## 2023-08-03 DIAGNOSIS — O10.919 CHRONIC HYPERTENSION AFFECTING PREGNANCY: ICD-10-CM

## 2023-08-03 DIAGNOSIS — O12.03: ICD-10-CM

## 2023-08-03 DIAGNOSIS — R39.9 UTI SYMPTOMS: ICD-10-CM

## 2023-08-03 DIAGNOSIS — Z3A.30 30 WEEKS GESTATION OF PREGNANCY: ICD-10-CM

## 2023-08-03 DIAGNOSIS — O09.93 SUPERVISION OF HIGH RISK PREGNANCY IN THIRD TRIMESTER: ICD-10-CM

## 2023-08-03 DIAGNOSIS — O09.513 PRIMIGRAVIDA OF ADVANCED MATERNAL AGE IN THIRD TRIMESTER: ICD-10-CM

## 2023-08-03 LAB
BACTERIA UR QL AUTO: ABNORMAL /HPF
BILIRUB UR QL STRIP: NEGATIVE
BV WHIFF TEST VAG QL: ABNORMAL
CLARITY UR: CLEAR
CLUE CELLS SPEC QL WET PREP: ABNORMAL
COLOR UR: ABNORMAL
GLUCOSE UR STRIP-MCNC: NEGATIVE MG/DL
HGB UR QL STRIP.AUTO: NEGATIVE
KETONES UR STRIP-MCNC: NEGATIVE MG/DL
LEUKOCYTE ESTERASE UR QL STRIP: NEGATIVE
MUCOUS THREADS UR QL AUTO: ABNORMAL
NITRITE UR QL STRIP: NEGATIVE
NON-SQ EPI CELLS URNS QL MICRO: ABNORMAL /HPF
PH SMN: 4.5 [PH]
PH UR STRIP.AUTO: 6.5 [PH]
PROT UR STRIP-MCNC: NEGATIVE MG/DL
RBC #/AREA URNS AUTO: ABNORMAL /HPF
SL AMB  POCT GLUCOSE, UA: ABNORMAL
SL AMB LEUKOCYTE ESTERASE,UA: 70
SL AMB POCT BILIRUBIN,UA: ABNORMAL
SL AMB POCT BLOOD,UA: ABNORMAL
SL AMB POCT CLARITY,UA: ABNORMAL
SL AMB POCT COLOR,UA: YELLOW
SL AMB POCT KETONES,UA: 5
SL AMB POCT NITRITE,UA: ABNORMAL
SL AMB POCT PH,UA: 6.5
SL AMB POCT SPECIFIC GRAVITY,UA: 1.01
SL AMB POCT URINE PROTEIN: ABNORMAL
SL AMB POCT UROBILINOGEN: 0.2
SP GR UR STRIP.AUTO: 1.01 (ref 1–1.03)
T VAGINALIS VAG QL WET PREP: ABNORMAL
UROBILINOGEN UR STRIP-ACNC: <2 MG/DL
WBC #/AREA URNS AUTO: ABNORMAL /HPF
YEAST VAG QL WET PREP: ABNORMAL

## 2023-08-03 PROCEDURE — 81514 NFCT DS BV&VAGINITIS DNA ALG: CPT | Performed by: OBSTETRICS & GYNECOLOGY

## 2023-08-03 PROCEDURE — 87210 SMEAR WET MOUNT SALINE/INK: CPT | Performed by: OBSTETRICS & GYNECOLOGY

## 2023-08-03 PROCEDURE — 81002 URINALYSIS NONAUTO W/O SCOPE: CPT | Performed by: OBSTETRICS & GYNECOLOGY

## 2023-08-03 PROCEDURE — 81001 URINALYSIS AUTO W/SCOPE: CPT | Performed by: OBSTETRICS & GYNECOLOGY

## 2023-08-03 PROCEDURE — 87086 URINE CULTURE/COLONY COUNT: CPT | Performed by: OBSTETRICS & GYNECOLOGY

## 2023-08-03 PROCEDURE — PNV: Performed by: OBSTETRICS & GYNECOLOGY

## 2023-08-03 RX ORDER — METRONIDAZOLE 500 MG/1
500 TABLET ORAL EVERY 12 HOURS SCHEDULED
Qty: 14 TABLET | Refills: 0 | Status: SHIPPED | OUTPATIENT
Start: 2023-08-03 | End: 2023-08-10

## 2023-08-03 RX ORDER — ENOXAPARIN SODIUM 100 MG/ML
40 INJECTION SUBCUTANEOUS DAILY
Qty: 12 ML | Refills: 3 | Status: SHIPPED | OUTPATIENT
Start: 2023-08-03 | End: 2023-09-02

## 2023-08-04 LAB
BACTERIA UR CULT: NORMAL
C GLABRATA DNA VAG QL NAA+PROBE: NEGATIVE
C KRUSEI DNA VAG QL NAA+PROBE: NEGATIVE
CANDIDA SP 6 PNL VAG NAA+PROBE: POSITIVE
T VAGINALIS DNA VAG QL NAA+PROBE: NEGATIVE
VAGINOSIS/ITIS DNA PNL VAG PROBE+SIG AMP: NEGATIVE

## 2023-08-08 ENCOUNTER — TELEPHONE (OUTPATIENT)
Dept: OBGYN CLINIC | Facility: CLINIC | Age: 38
End: 2023-08-08

## 2023-08-08 DIAGNOSIS — B37.9 YEAST DETECTED: Primary | ICD-10-CM

## 2023-08-08 RX ORDER — FLUCONAZOLE 150 MG/1
150 TABLET ORAL
Qty: 2 TABLET | Refills: 0 | Status: SHIPPED | OUTPATIENT
Start: 2023-08-08 | End: 2023-08-12

## 2023-08-08 NOTE — TELEPHONE ENCOUNTER
Please send in Diflucan to [facility] in [facility] per result note from Molecular Vaginal Panel

## 2023-08-09 ENCOUNTER — LAB (OUTPATIENT)
Dept: LAB | Facility: CLINIC | Age: 38
End: 2023-08-09
Payer: COMMERCIAL

## 2023-08-09 DIAGNOSIS — O12.03: ICD-10-CM

## 2023-08-09 DIAGNOSIS — E87.6 HYPOKALEMIA: Primary | ICD-10-CM

## 2023-08-09 LAB
ALBUMIN SERPL BCP-MCNC: 3 G/DL (ref 3.5–5)
ALP SERPL-CCNC: 61 U/L (ref 46–116)
ALT SERPL W P-5'-P-CCNC: 24 U/L (ref 12–78)
ANION GAP SERPL CALCULATED.3IONS-SCNC: 6 MMOL/L
AST SERPL W P-5'-P-CCNC: 17 U/L (ref 5–45)
BILIRUB SERPL-MCNC: 0.26 MG/DL (ref 0.2–1)
BUN SERPL-MCNC: 9 MG/DL (ref 5–25)
CALCIUM ALBUM COR SERPL-MCNC: 10.5 MG/DL (ref 8.3–10.1)
CALCIUM SERPL-MCNC: 9.7 MG/DL (ref 8.3–10.1)
CHLORIDE SERPL-SCNC: 110 MMOL/L (ref 96–108)
CO2 SERPL-SCNC: 23 MMOL/L (ref 21–32)
CREAT SERPL-MCNC: 0.9 MG/DL (ref 0.6–1.3)
CREAT UR-MCNC: 26.6 MG/DL
ERYTHROCYTE [DISTWIDTH] IN BLOOD BY AUTOMATED COUNT: 14.4 % (ref 11.6–15.1)
GFR SERPL CREATININE-BSD FRML MDRD: 80 ML/MIN/1.73SQ M
GLUCOSE SERPL-MCNC: 60 MG/DL (ref 65–140)
HCT VFR BLD AUTO: 34.8 % (ref 34.8–46.1)
HGB BLD-MCNC: 11.8 G/DL (ref 11.5–15.4)
MCH RBC QN AUTO: 28.5 PG (ref 26.8–34.3)
MCHC RBC AUTO-ENTMCNC: 33.9 G/DL (ref 31.4–37.4)
MCV RBC AUTO: 84 FL (ref 82–98)
PLATELET # BLD AUTO: 270 THOUSANDS/UL (ref 149–390)
PMV BLD AUTO: 11.4 FL (ref 8.9–12.7)
POTASSIUM SERPL-SCNC: 3.1 MMOL/L (ref 3.5–5.3)
PROT SERPL-MCNC: 7.2 G/DL (ref 6.4–8.4)
PROT UR-MCNC: 7 MG/DL
PROT/CREAT UR: 0.26 MG/G{CREAT} (ref 0–0.1)
RBC # BLD AUTO: 4.14 MILLION/UL (ref 3.81–5.12)
SODIUM SERPL-SCNC: 139 MMOL/L (ref 135–147)
WBC # BLD AUTO: 13.19 THOUSAND/UL (ref 4.31–10.16)

## 2023-08-09 PROCEDURE — 84156 ASSAY OF PROTEIN URINE: CPT | Performed by: OBSTETRICS & GYNECOLOGY

## 2023-08-09 PROCEDURE — 36415 COLL VENOUS BLD VENIPUNCTURE: CPT

## 2023-08-09 PROCEDURE — 85027 COMPLETE CBC AUTOMATED: CPT

## 2023-08-09 PROCEDURE — 80053 COMPREHEN METABOLIC PANEL: CPT

## 2023-08-09 PROCEDURE — 82570 ASSAY OF URINE CREATININE: CPT | Performed by: OBSTETRICS & GYNECOLOGY

## 2023-08-11 RX ORDER — POTASSIUM CHLORIDE 20 MEQ/1
20 TABLET, EXTENDED RELEASE ORAL 2 TIMES DAILY
Qty: 4 TABLET | Refills: 0 | Status: SHIPPED | OUTPATIENT
Start: 2023-08-11 | End: 2023-08-14 | Stop reason: SDUPTHER

## 2023-08-14 ENCOUNTER — HOSPITAL ENCOUNTER (OUTPATIENT)
Facility: HOSPITAL | Age: 38
Discharge: HOME | End: 2023-08-14
Attending: OBSTETRICS & GYNECOLOGY | Admitting: OBSTETRICS & GYNECOLOGY
Payer: COMMERCIAL

## 2023-08-14 ENCOUNTER — ROUTINE PRENATAL (OUTPATIENT)
Dept: OBGYN CLINIC | Facility: CLINIC | Age: 38
End: 2023-08-14
Payer: COMMERCIAL

## 2023-08-14 ENCOUNTER — TELEPHONE (OUTPATIENT)
Age: 38
End: 2023-08-14

## 2023-08-14 VITALS
SYSTOLIC BLOOD PRESSURE: 146 MMHG | BODY MASS INDEX: 35.7 KG/M2 | OXYGEN SATURATION: 99 % | TEMPERATURE: 98.8 F | HEIGHT: 71 IN | WEIGHT: 255 LBS | HEART RATE: 108 BPM | DIASTOLIC BLOOD PRESSURE: 82 MMHG

## 2023-08-14 VITALS
HEART RATE: 102 BPM | RESPIRATION RATE: 18 BRPM | SYSTOLIC BLOOD PRESSURE: 140 MMHG | TEMPERATURE: 98.6 F | DIASTOLIC BLOOD PRESSURE: 85 MMHG

## 2023-08-14 DIAGNOSIS — O10.919 CHRONIC HYPERTENSION AFFECTING PREGNANCY: ICD-10-CM

## 2023-08-14 DIAGNOSIS — O09.891 MEDICATION EXPOSURE DURING FIRST TRIMESTER OF PREGNANCY: ICD-10-CM

## 2023-08-14 DIAGNOSIS — O26.03 EXCESSIVE WEIGHT GAIN DURING PREGNANCY IN THIRD TRIMESTER: ICD-10-CM

## 2023-08-14 DIAGNOSIS — R82.71 GROUP B STREPTOCOCCAL BACTERIURIA: Primary | ICD-10-CM

## 2023-08-14 DIAGNOSIS — Z14.8 CARRIER OF GENETIC DISORDER: ICD-10-CM

## 2023-08-14 DIAGNOSIS — O09.93 SUPERVISION OF HIGH RISK PREGNANCY IN THIRD TRIMESTER: ICD-10-CM

## 2023-08-14 DIAGNOSIS — E87.6 HYPOKALEMIA: ICD-10-CM

## 2023-08-14 DIAGNOSIS — R10.9 ABDOMINAL PAIN, UNSPECIFIED ABDOMINAL LOCATION: ICD-10-CM

## 2023-08-14 DIAGNOSIS — Z3A.32 32 WEEKS GESTATION OF PREGNANCY: ICD-10-CM

## 2023-08-14 LAB
ALBUMIN SERPL BCP-MCNC: 3.5 G/DL (ref 3.5–5)
ALP SERPL-CCNC: 50 U/L (ref 34–104)
ALT SERPL W P-5'-P-CCNC: 15 U/L (ref 7–52)
ANION GAP SERPL CALCULATED.3IONS-SCNC: 5 MMOL/L
AST SERPL W P-5'-P-CCNC: 15 U/L (ref 13–39)
BILIRUB SERPL-MCNC: 0.24 MG/DL (ref 0.2–1)
BUN SERPL-MCNC: 8 MG/DL (ref 5–25)
CALCIUM SERPL-MCNC: 9.6 MG/DL (ref 8.4–10.2)
CHLORIDE SERPL-SCNC: 109 MMOL/L (ref 96–108)
CO2 SERPL-SCNC: 23 MMOL/L (ref 21–32)
CREAT SERPL-MCNC: 0.88 MG/DL (ref 0.6–1.3)
CREAT UR-MCNC: 28.4 MG/DL
ERYTHROCYTE [DISTWIDTH] IN BLOOD BY AUTOMATED COUNT: 14.6 % (ref 11.6–15.1)
GFR SERPL CREATININE-BSD FRML MDRD: 83 ML/MIN/1.73SQ M
GLUCOSE SERPL-MCNC: 85 MG/DL (ref 65–140)
HCT VFR BLD AUTO: 33.2 % (ref 34.8–46.1)
HGB BLD-MCNC: 11.1 G/DL (ref 11.5–15.4)
MCH RBC QN AUTO: 28.5 PG (ref 26.8–34.3)
MCHC RBC AUTO-ENTMCNC: 33.4 G/DL (ref 31.4–37.4)
MCV RBC AUTO: 85 FL (ref 82–98)
PLATELET # BLD AUTO: 275 THOUSANDS/UL (ref 149–390)
PMV BLD AUTO: 10.1 FL (ref 8.9–12.7)
POTASSIUM SERPL-SCNC: 2.7 MMOL/L (ref 3.5–5.3)
PROT SERPL-MCNC: 6.6 G/DL (ref 6.4–8.4)
PROT UR-MCNC: 4 MG/DL
PROT/CREAT UR: 0.14 MG/G{CREAT} (ref 0–0.1)
RBC # BLD AUTO: 3.9 MILLION/UL (ref 3.81–5.12)
SL AMB  POCT GLUCOSE, UA: NEGATIVE
SL AMB LEUKOCYTE ESTERASE,UA: ABNORMAL
SL AMB POCT BILIRUBIN,UA: NEGATIVE
SL AMB POCT BLOOD,UA: NEGATIVE
SL AMB POCT CLARITY,UA: CLEAR
SL AMB POCT COLOR,UA: YELLOW
SL AMB POCT KETONES,UA: NEGATIVE
SL AMB POCT NITRITE,UA: NEGATIVE
SL AMB POCT PH,UA: 7.5
SL AMB POCT SPECIFIC GRAVITY,UA: 1.01
SL AMB POCT URINE PROTEIN: ABNORMAL
SL AMB POCT UROBILINOGEN: NEGATIVE
SODIUM SERPL-SCNC: 137 MMOL/L (ref 135–147)
WBC # BLD AUTO: 12.46 THOUSAND/UL (ref 4.31–10.16)

## 2023-08-14 PROCEDURE — 84156 ASSAY OF PROTEIN URINE: CPT

## 2023-08-14 PROCEDURE — 81002 URINALYSIS NONAUTO W/O SCOPE: CPT | Performed by: OBSTETRICS & GYNECOLOGY

## 2023-08-14 PROCEDURE — PNV: Performed by: OBSTETRICS & GYNECOLOGY

## 2023-08-14 PROCEDURE — 80053 COMPREHEN METABOLIC PANEL: CPT

## 2023-08-14 PROCEDURE — 85027 COMPLETE CBC AUTOMATED: CPT

## 2023-08-14 PROCEDURE — 82570 ASSAY OF URINE CREATININE: CPT

## 2023-08-14 PROCEDURE — NC001 PR NO CHARGE: Performed by: OBSTETRICS & GYNECOLOGY

## 2023-08-14 PROCEDURE — G0463 HOSPITAL OUTPT CLINIC VISIT: HCPCS

## 2023-08-14 PROCEDURE — 99213 OFFICE O/P EST LOW 20 MIN: CPT

## 2023-08-14 RX ORDER — ONDANSETRON 4 MG/1
4 TABLET, FILM COATED ORAL EVERY 8 HOURS PRN
Qty: 20 TABLET | Refills: 0 | Status: SHIPPED | OUTPATIENT
Start: 2023-08-14

## 2023-08-14 RX ORDER — POTASSIUM CHLORIDE 20 MEQ/1
20 TABLET, EXTENDED RELEASE ORAL 2 TIMES DAILY
Qty: 4 TABLET | Refills: 0 | Status: SHIPPED | OUTPATIENT
Start: 2023-08-14 | End: 2023-08-16

## 2023-08-14 RX ORDER — METOCLOPRAMIDE 10 MG/1
10 TABLET ORAL ONCE
Status: COMPLETED | OUTPATIENT
Start: 2023-08-14 | End: 2023-08-14

## 2023-08-14 RX ORDER — ACETAMINOPHEN 325 MG/1
975 TABLET ORAL ONCE
Status: COMPLETED | OUTPATIENT
Start: 2023-08-14 | End: 2023-08-14

## 2023-08-14 RX ORDER — LABETALOL 200 MG/1
200 TABLET, FILM COATED ORAL 2 TIMES DAILY
Qty: 180 TABLET | Refills: 0 | Status: SHIPPED | OUTPATIENT
Start: 2023-08-14 | End: 2023-11-12

## 2023-08-14 RX ORDER — POTASSIUM CHLORIDE 20 MEQ/1
40 TABLET, EXTENDED RELEASE ORAL ONCE
Status: COMPLETED | OUTPATIENT
Start: 2023-08-14 | End: 2023-08-14

## 2023-08-14 RX ORDER — DIPHENHYDRAMINE HCL 25 MG
25 TABLET ORAL ONCE
Status: COMPLETED | OUTPATIENT
Start: 2023-08-14 | End: 2023-08-14

## 2023-08-14 RX ADMIN — METOCLOPRAMIDE 10 MG: 10 TABLET ORAL at 12:54

## 2023-08-14 RX ADMIN — DIPHENHYDRAMINE HCL 25 MG: 25 TABLET, COATED ORAL at 12:54

## 2023-08-14 RX ADMIN — ACETAMINOPHEN 975 MG: 325 TABLET ORAL at 12:54

## 2023-08-14 RX ADMIN — POTASSIUM CHLORIDE 40 MEQ: 1500 TABLET, EXTENDED RELEASE ORAL at 13:54

## 2023-08-14 NOTE — PROGRESS NOTES
S: 43 y.o.  32w1d here for routine prenatal visit. Chief Complaint   Patient presents with   • Routine Prenatal Visit     Pt states she has been having headaches w/blurred vision, upper abdominal pain on rt side, also a lot of nausea; rt eye has started twitching, also pt states that her pulse has been fast, also very thirsty all the time, and vaginal pressure on lower left side. OB complaints:  Contractions: no  Leakage: no  Bleeding: no  Fetal movement: yes    Worsening vaginal pressure with intermittent cramping   Headaches worse in last 1.5 weeks. Blurry vision in last week. Some LH/dizziness associated   Feels like nausea has worsened also in past 2 weeks, without vomiting. Has pain on right upper side of abdomen     O:  /82 (BP Location: Right arm, Patient Position: Sitting, Cuff Size: Large)   Pulse (!) 108   Temp 98.8 °F (37.1 °C) (Tympanic)   Ht 5' 11" (1.803 m)   Wt 116 kg (255 lb)   LMP 2023   SpO2 99%   BMI 35.57 kg/m²       Review of Systems   Constitutional: Negative for chills and fever. Eyes: Positive for visual disturbance. Respiratory: Negative for chest tightness and shortness of breath. Cardiovascular: Negative for chest pain. Gastrointestinal: Positive for abdominal pain and nausea. Negative for diarrhea and vomiting. Genitourinary: Negative for pelvic pain and vaginal bleeding. As noted in HPI   Skin: Negative for rash. Neurological: Positive for light-headedness and headaches. Negative for tremors, seizures, syncope, speech difficulty, weakness and numbness. All other systems reviewed and are negative. Physical Exam  Constitutional:       General: She is not in acute distress. Appearance: Normal appearance. HENT:      Head: Normocephalic and atraumatic. Cardiovascular:      Rate and Rhythm: Normal rate. Pulmonary:      Effort: Pulmonary effort is normal. No respiratory distress.    Abdominal:      General: There is no distension. Palpations: Abdomen is soft. Tenderness: There is abdominal tenderness in the right upper quadrant. There is no guarding or rebound. Comments: gravid   Neurological:      General: No focal deficit present. Mental Status: She is alert. Psychiatric:         Mood and Affect: Mood normal.         Behavior: Behavior normal.   Vitals and nursing note reviewed.            Fundal Height (cm): 31 cm  Fetal Heart Rate: 155    Pregravid Weight/BMI: 91.6 kg (202 lb) (BMI 28.19)  Current Weight: 116 kg (255 lb)   Total Weight Gain: 24 kg (53 lb)     Pre- Vitals    Flowsheet Row Most Recent Value   Prenatal Assessment    Fetal Heart Rate 155   Fundal Height (cm) 31 cm   Movement Present   Prenatal Vitals    Blood Pressure 146/82   Weight - Scale 116 kg (255 lb)   Urine Albumin/Glucose    Dilation/Effacement/Station    Vaginal Drainage    Edema             Results for orders placed or performed in visit on 23   POCT urine dip   Result Value Ref Range    LEUKOCYTE ESTERASE,UA +     NITRITE,UA Negative     SL AMB POCT UROBILINOGEN Negative     POCT URINE PROTEIN trace      PH,UA 7.5     BLOOD,UA Negative     SPECIFIC GRAVITY,UA 1.010     KETONES,UA Negative     BILIRUBIN,UA Negative     GLUCOSE, UA Negative      COLOR,UA Yellow     CLARITY,UA clear          Problem List        Digestive    GERD (gastroesophageal reflux disease)       Endocrine    PCOS (polycystic ovarian syndrome)    Impaired glucose tolerance during pregnancy    Overview     glucola elevated at 27 weeks - 3 hr GTT normal             Respiratory    Mild intermittent asthma without complication    Overview     Inhalers PRN         Asthma    Overview     Inhalers PRN            Cardiovascular and Mediastinum    Chronic hypertension affecting pregnancy    Overview     No meds currently   Was previously on lisinopril and metoprolol - after weight loss no metoprolol   Stopped lisinopril 2023   Baseline labs normal, UPC undetectable   Aspirin recommended         Current Assessment & Plan     In last week home BP's have increased 150's/80's-90's consistently. At max 161/92 last week   Given sx would advise evaluation on L&D - charge RN and covering physician made aware          Migraine       Genitourinary    Interstitial cystitis       Other    History of pulmonary embolism    Overview     This was unprovoked in the setting of oral contraceptive use. She had a thorough thrombophilia work-up which can be seen in her lab reports from August 18, 2022 which was negative for inherited or acquired thrombophilia. She is recommended to continue Lovenox prophylaxis throughout her pregnancy and 6 weeks postpartum         Family history of breast cancer    Overview     PGM @ 78         Mass of lower inner quadrant of left breast    Overview      US 3/2023: Previously noted area of probable fat necrosis in the 7 o'clock position of the left breast, 5 cm from the nipple has significantly decreased in size currently measuring 5 x 4 x 7 mm. 6 month f/u scheduled 10/4/23         Supervision of high-risk pregnancy    Overview     ASA recommended  Flu vaccine:  completed  Covid vaccine:  completed X 5  NIPS/ MsAFP- negative           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    Overview     Patient is a carrier for CF, alpha thalassemia, Estephanie Lacy - had carrier screening with Suzy Benz  Partner testing:  pending         32 weeks gestation of pregnancy    Depression affecting pregnancy    Overview     Stopped topomax, buspar, prozac with + pregnancy  Behavioral health referral sent         Medication exposure during first trimester of pregnancy    Overview     Stopped topomax with + pregnancy test         Obesity affecting pregnancy    Overview     Pre gestational BMI 30  Early glucola: 3/21 117         Group B streptococcal bacteriuria    Overview     + GBS in urine @ 14 weeks:    Will need to treat in labor         Primigravida of advanced maternal age in third trimester    Excessive weight gain during pregnancy in third trimester    Overview     53 lbs as of 32 weeks         Other Visit Diagnoses     Abdominal pain, unspecified abdominal location                                Lia Vergara MD  8/14/2023  11:37 AM

## 2023-08-14 NOTE — PROGRESS NOTES
L&D Triage Note - OB/GYN  Jimmie Amador 43 y.o. female MRN: [id number]  Unit/Bed#: L&D 327-01 Encounter: [id number]      ASSESSMENT:    Jimmie Amador is a 43 y.o. Lyndle Laser at 32w1d presents from the office with headache, vision changes, and RUQ pain. Preeclampsia labs show hypokalemia but otherwise are within normal limits. Patient's headache has improved after medication. She is suitable for discharge home with good precautions. PLAN:    1) rule out superimposed preeclampsia   -CBC wnl, CMP reveals hypokalemia, PC: 0.14   -headache: tylenol, reglan, benadryl   -Kdur 40, follow-up BMP outpatient    2) chronic hypertension   -BP has been slowly increasing throughout pregnancy. Will start labetalol 200 BID. Sent to pharmacy    3) Continue routine prenatal care  4) Discharge from [facility] triage with  labor precautions    - Reviewed rupture of membranes, false vs true labor, decreased fetal movement, and vaginal bleeding   - Pt to call provider with any concerns and follow up at her next scheduled prenatal appointment    - Case discussed with Dr. Bentley Stain:    Jimmie Amador 43 y.o. Mariajosedle Laser at [address] with an Estimated Date of Delivery: 10/8/23 She comes in from the office having a headache, a twitchy right eye, and RUQ pain. She gets headaches pretty regularly, and her eye does twitch occasionally, but it has been worse today. She also endorses RUQ pain and some nausea. Denies shortness of breath, fevers/chills, constipation/diarrhea.     Her current obstetrical history is significant for CHTN, Hx of PE on lovenox, asthma, GBS pos    Contractions: denies  Leakage of fluid: denies  Vaginal Bleeding: denies  Fetal movement: present    OBJECTIVE:    Vitals:    23 1223   BP: 140/85   Pulse: 102   Resp: 18   Temp: 98.6 °F (37 °C)       ROS:  Constitutional: Negative  Respiratory: Negative  Cardiovascular: Negative    Gastrointestinal: Some nausea, RUQ pain    General Physical Exam:  General: non-toxic, alert and cooperative  Cardiovascular: Cor RRR  Lungs: non-labored breathing  Abdomen: abdomen is soft without significant tenderness, masses, organomegaly or guarding  Lower extremeties: nontender    Fetal monitoring:  FHT:  135 bpm/ Moderate 6 - 25 bpm / 15 x 15 accelerations present, no decelerations  [place]: quiet     Farzaneh Ford MD  OBGYN PGY-2  8/14/2023 2:15 PM

## 2023-08-14 NOTE — ASSESSMENT & PLAN NOTE
In last week home BP's have increased 150's/80's-90's consistently.  At max 161/92 last week   Given sx would advise evaluation on L&D - charge RN and covering physician made aware

## 2023-08-14 NOTE — TELEPHONE ENCOUNTER
Reviewed results with patient in regards to low potasium and that a potasium replacement was sent to pharmacy. Was not sure if patient needs another script since the medication prescription says ended and patient did not  medication. Patient has appt later this morning. Please advise.

## 2023-08-15 ENCOUNTER — ULTRASOUND (OUTPATIENT)
Dept: PERINATAL CARE | Facility: OTHER | Age: 38
End: 2023-08-15
Payer: COMMERCIAL

## 2023-08-15 VITALS
HEIGHT: 71 IN | WEIGHT: 255.6 LBS | HEART RATE: 100 BPM | BODY MASS INDEX: 35.78 KG/M2 | DIASTOLIC BLOOD PRESSURE: 80 MMHG | SYSTOLIC BLOOD PRESSURE: 152 MMHG

## 2023-08-15 DIAGNOSIS — O10.913 MATERNAL CHRONIC HYPERTENSION, THIRD TRIMESTER: Primary | ICD-10-CM

## 2023-08-15 DIAGNOSIS — J45.909 ASTHMA AFFECTING PREGNANCY IN THIRD TRIMESTER: ICD-10-CM

## 2023-08-15 DIAGNOSIS — O09.513 PRIMIGRAVIDA OF ADVANCED MATERNAL AGE IN THIRD TRIMESTER: ICD-10-CM

## 2023-08-15 DIAGNOSIS — O99.343 DEPRESSION COMPLICATING PREGNANCY, ANTEPARTUM, THIRD TRIMESTER: ICD-10-CM

## 2023-08-15 DIAGNOSIS — F32.A DEPRESSION COMPLICATING PREGNANCY, ANTEPARTUM, THIRD TRIMESTER: ICD-10-CM

## 2023-08-15 DIAGNOSIS — Z3A.32 32 WEEKS GESTATION OF PREGNANCY: ICD-10-CM

## 2023-08-15 DIAGNOSIS — O99.513 ASTHMA AFFECTING PREGNANCY IN THIRD TRIMESTER: ICD-10-CM

## 2023-08-15 PROCEDURE — 76816 OB US FOLLOW-UP PER FETUS: CPT | Performed by: OBSTETRICS & GYNECOLOGY

## 2023-08-15 PROCEDURE — 99214 OFFICE O/P EST MOD 30 MIN: CPT | Performed by: OBSTETRICS & GYNECOLOGY

## 2023-08-15 NOTE — PROGRESS NOTES
Please refer to the [facility] ultrasound report in Ob Procedures for additional information regarding today's visit

## 2023-08-15 NOTE — PATIENT INSTRUCTIONS
Kick Counts in Pregnancy   WHAT YOU NEED TO KNOW:   Kick counts measure how much your baby is moving in your womb. A kick from your baby can be felt as a twist, turn, swish, roll, or jab. It is common to feel your baby kicking at 26 to 28 weeks of pregnancy. You may feel your baby kick as early as 20 weeks of pregnancy. You may want to start counting at 28 weeks. DISCHARGE INSTRUCTIONS:   Contact your doctor immediately if:   You feel a change in the number of kicks or movements of your baby. You feel fewer than 10 kicks within 2 hours. You have questions or concerns about your baby's movements. Why measure kick counts:  Your baby's movement may provide information about your baby's health. He or she may move less, or not at all, if there are problems. Your baby may move less if he or she is not getting enough oxygen or nutrition from the placenta. Do not smoke while you are pregnant. Smoking decreases the amount of oxygen that gets to your baby. Talk to your healthcare provider if you need help to quit smoking. Tell your healthcare provider as soon as you feel a change in your baby's movements. When to measure kick counts:   Measure kick counts at the same time every day. Measure kick counts when your baby is awake and most active. Your baby may be most active in the evening. How to measure kick counts:  Check that your baby is awake before you measure kick counts. You can wake up your baby by lightly pushing on your belly, walking, or drinking something cold. Your healthcare provider may tell you different ways to measure kick counts. You may be told to do the following:  Use a chart or clock to keep track of the time you start and finish counting. Sit in a chair or lie on your left side. Place your hands on the largest part of your belly. Count until you reach 10 kicks. Write down how much time it takes to count 10 kicks. It may take 30 minutes to 2 hours to count 10 kicks. It should not take more than 2 hours to count 10 kicks. Follow up with your doctor as directed:  Write down your questions so you remember to ask them during your visits. © Copyright Daron Keller 2022 Information is for End User's use only and may not be sold, redistributed or otherwise used for commercial purposes. The above information is an  only. It is not intended as medical advice for individual conditions or treatments. Talk to your doctor, nurse or pharmacist before following any medical regimen to see if it is safe and effective for you.

## 2023-08-15 NOTE — LETTER
August 15, 2023     Rafael Bravo, [address]   [address]    Patient: Boogie Fay   YOB: 1985   Date of Visit: 8/15/2023       Dear Dr. Rosado Bolus: Thank you for referring Aicha Rosario to me for evaluation. Below are my notes for this consultation. If you have questions, please do not hesitate to call me. I look forward to following your patient along with you.          Sincerely,        Juice De Guzman MD        CC: No Recipients    Juice De Guzman MD  8/15/2023 10:42 AM  Sign when Signing Visit  Please refer to the [facility] ultrasound report in Ob Procedures for additional information regarding today's visit

## 2023-08-16 ENCOUNTER — APPOINTMENT (OUTPATIENT)
Dept: LAB | Facility: CLINIC | Age: 38
End: 2023-08-16
Payer: COMMERCIAL

## 2023-08-16 DIAGNOSIS — E87.6 HYPOKALEMIA: ICD-10-CM

## 2023-08-16 LAB
ANION GAP SERPL CALCULATED.3IONS-SCNC: 6 MMOL/L
BUN SERPL-MCNC: 7 MG/DL (ref 5–25)
CALCIUM SERPL-MCNC: 9.6 MG/DL (ref 8.3–10.1)
CHLORIDE SERPL-SCNC: 112 MMOL/L (ref 96–108)
CO2 SERPL-SCNC: 24 MMOL/L (ref 21–32)
CREAT SERPL-MCNC: 0.9 MG/DL (ref 0.6–1.3)
GFR SERPL CREATININE-BSD FRML MDRD: 80 ML/MIN/1.73SQ M
GLUCOSE P FAST SERPL-MCNC: 90 MG/DL (ref 65–99)
POTASSIUM SERPL-SCNC: 3.4 MMOL/L (ref 3.5–5.3)
SODIUM SERPL-SCNC: 142 MMOL/L (ref 135–147)

## 2023-08-16 PROCEDURE — 36415 COLL VENOUS BLD VENIPUNCTURE: CPT

## 2023-08-16 PROCEDURE — 80048 BASIC METABOLIC PNL TOTAL CA: CPT

## 2023-08-17 ENCOUNTER — ROUTINE PRENATAL (OUTPATIENT)
Dept: OBGYN CLINIC | Facility: CLINIC | Age: 38
End: 2023-08-17

## 2023-08-17 VITALS
DIASTOLIC BLOOD PRESSURE: 74 MMHG | BODY MASS INDEX: 36.34 KG/M2 | WEIGHT: 259.6 LBS | TEMPERATURE: 97.6 F | SYSTOLIC BLOOD PRESSURE: 124 MMHG | HEIGHT: 71 IN | HEART RATE: 82 BPM | OXYGEN SATURATION: 99 %

## 2023-08-17 DIAGNOSIS — O26.03 EXCESSIVE WEIGHT GAIN DURING PREGNANCY IN THIRD TRIMESTER: ICD-10-CM

## 2023-08-17 DIAGNOSIS — Z3A.32 32 WEEKS GESTATION OF PREGNANCY: ICD-10-CM

## 2023-08-17 DIAGNOSIS — O09.891 MEDICATION EXPOSURE DURING FIRST TRIMESTER OF PREGNANCY: ICD-10-CM

## 2023-08-17 DIAGNOSIS — O10.919 CHRONIC HYPERTENSION AFFECTING PREGNANCY: ICD-10-CM

## 2023-08-17 DIAGNOSIS — O09.93 SUPERVISION OF HIGH RISK PREGNANCY IN THIRD TRIMESTER: ICD-10-CM

## 2023-08-17 DIAGNOSIS — R82.71 GROUP B STREPTOCOCCAL BACTERIURIA: Primary | ICD-10-CM

## 2023-08-17 DIAGNOSIS — Z14.8 CARRIER OF GENETIC DISORDER: ICD-10-CM

## 2023-08-17 PROCEDURE — PNV: Performed by: OBSTETRICS & GYNECOLOGY

## 2023-08-17 NOTE — ASSESSMENT & PLAN NOTE
Seen in office 3 days ago with elevated BP, sent to L&D for further evaluation - no evidence of superimposed preeclampsia at that time. Was started on labetalol 200 mg BID.    Growth US on 8/15 EFW 86th% - starting weekly surveillance   Reports improvement in BP's at home - now max 130's/80's  Precautions reviewed

## 2023-08-17 NOTE — PROGRESS NOTES
S: 43 y.o. Arlene Temple here for routine prenatal visit. Chief Complaint   Patient presents with   • Routine Prenatal Visit     Pt states that she is always very thirsty despite drinking 32-48 oz of water in the evening, wanted to ask about BW regarding this         OB complaints:  Contractions: no  Leakage: no  Bleeding: no  Fetal movement: yes        O:  /74 (BP Location: Right arm, Patient Position: Sitting, Cuff Size: Large)   Pulse 82   Temp 97.6 °F (36.4 °C) (Tympanic)   Ht 5' 11" (1.803 m)   Wt 118 kg (259 lb 9.6 oz)   LMP 01/06/2023   SpO2 99%   BMI 36.21 kg/m²       Review of Systems   Constitutional: Negative for chills and fever. Eyes: Negative for visual disturbance. Respiratory: Negative for chest tightness and shortness of breath. Cardiovascular: Negative for chest pain. Gastrointestinal: Negative for abdominal pain, diarrhea, nausea and vomiting. Genitourinary: Negative for pelvic pain and vaginal bleeding. As noted in HPI   Skin: Negative for rash. Neurological: Negative for headaches. All other systems reviewed and are negative. Physical Exam  Constitutional:       General: She is not in acute distress. Appearance: Normal appearance. HENT:      Head: Normocephalic and atraumatic. Cardiovascular:      Rate and Rhythm: Normal rate. Pulmonary:      Effort: Pulmonary effort is normal. No respiratory distress. Abdominal:      General: There is no distension. Palpations: Abdomen is soft. Tenderness: There is no abdominal tenderness. There is no guarding or rebound. Comments: gravid   Neurological:      General: No focal deficit present. Mental Status: She is alert. Psychiatric:         Mood and Affect: Mood normal.         Behavior: Behavior normal.   Vitals and nursing note reviewed.            Fundal Height (cm): 32 cm  Fetal Heart Rate: 125    Pregravid Weight/BMI: 91.6 kg (202 lb) (BMI 28.19)  Current Weight: 118 kg (259 lb 9.6 oz)   Total Weight Gain: 26.1 kg (57 lb 9.6 oz)     Pre- Vitals    Flowsheet Row Most Recent Value   Prenatal Assessment    Fetal Heart Rate 125   Fundal Height (cm) 32 cm   Movement Present   Prenatal Vitals    Blood Pressure 124/74   Weight - Scale 118 kg (259 lb 9.6 oz)   Urine Albumin/Glucose    Dilation/Effacement/Station    Vaginal Drainage    Edema                   Problem List        Digestive    GERD (gastroesophageal reflux disease)       Endocrine    PCOS (polycystic ovarian syndrome)    Impaired glucose tolerance during pregnancy    Overview     glucola elevated at 27 weeks - 3 hr GTT normal             Respiratory    Mild intermittent asthma without complication    Overview     Inhalers PRN         Asthma    Overview     Inhalers PRN            Cardiovascular and Mediastinum    Chronic hypertension affecting pregnancy    Overview     No meds currently   Was previously on lisinopril and metoprolol - after weight loss no metoprolol   Stopped lisinopril 2023   Baseline labs normal, UPC undetectable   Aspirin recommended   (32 weeks): started labetalol 200 BID  - weekly surveillance 32 weeks          Current Assessment & Plan     Seen in office 3 days ago with elevated BP, sent to L&D for further evaluation - no evidence of superimposed preeclampsia at that time. Was started on labetalol 200 mg BID. Growth US on 8/15 EFW 86th% - starting weekly surveillance   Reports improvement in BP's at home - now max 130's/80's  Precautions reviewed         Migraine       Genitourinary    Interstitial cystitis       Other    History of pulmonary embolism    Overview     This was unprovoked in the setting of oral contraceptive use. She had a thorough thrombophilia work-up which can be seen in her lab reports from 2022 which was negative for inherited or acquired thrombophilia.   She is recommended to continue Lovenox prophylaxis throughout her pregnancy and 6 weeks postpartum Family history of breast cancer    Overview     PGM @ 78         Mass of lower inner quadrant of left breast    Overview      US 3/2023: Previously noted area of probable fat necrosis in the 7 o'clock position of the left breast, 5 cm from the nipple has significantly decreased in size currently measuring 5 x 4 x 7 mm. 6 month f/u scheduled 10/4/23         Supervision of high-risk pregnancy    Overview     ASA recommended  Flu vaccine:  completed  Covid vaccine:  completed X 5  NIPS/ MsAFP- negative  S/p tdap   Consents signed         Current Assessment & Plan     OB precautions reviewed         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    Overview     Patient is a carrier for CF, alpha thalassemia, Charla Houser - had carrier screening with Valorie Wolf  Partner testing:  pending         32 weeks gestation of pregnancy    Depression affecting pregnancy    Overview     Stopped topomax, buspar, prozac with + pregnancy  Behavioral health referral sent         Medication exposure during first trimester of pregnancy    Overview     Stopped topomax with + pregnancy test         Group B streptococcal bacteriuria    Overview     + GBS in urine @ 14 weeks:    Will need to treat in labor         Primigravida of advanced maternal age in third trimester    Excessive weight gain during pregnancy in third trimester    Overview     53 lbs as of 28 weeks                               King Han MD  8/17/2023  1:40 PM

## 2023-08-29 ENCOUNTER — ULTRASOUND (OUTPATIENT)
Dept: PERINATAL CARE | Facility: OTHER | Age: 38
End: 2023-08-29
Payer: COMMERCIAL

## 2023-08-29 ENCOUNTER — TELEPHONE (OUTPATIENT)
Dept: OBGYN CLINIC | Facility: CLINIC | Age: 38
End: 2023-08-29

## 2023-08-29 VITALS
HEIGHT: 71 IN | WEIGHT: 258.8 LBS | HEART RATE: 98 BPM | DIASTOLIC BLOOD PRESSURE: 72 MMHG | SYSTOLIC BLOOD PRESSURE: 132 MMHG | BODY MASS INDEX: 36.23 KG/M2

## 2023-08-29 DIAGNOSIS — O10.913 MATERNAL CHRONIC HYPERTENSION, THIRD TRIMESTER: ICD-10-CM

## 2023-08-29 DIAGNOSIS — Z3A.34 34 WEEKS GESTATION OF PREGNANCY: Primary | ICD-10-CM

## 2023-08-29 PROCEDURE — 59025 FETAL NON-STRESS TEST: CPT | Performed by: OBSTETRICS & GYNECOLOGY

## 2023-08-29 PROCEDURE — 76815 OB US LIMITED FETUS(S): CPT | Performed by: OBSTETRICS & GYNECOLOGY

## 2023-08-29 RX ORDER — DOCUSATE SODIUM 250 MG
250 CAPSULE ORAL DAILY
COMMUNITY

## 2023-08-29 NOTE — LETTER
August 29, 2023     Bruce Barahona, [address] [address]    Patient: Natali Francisco   YOB: 1985   Date of Visit: 8/29/2023       Dear Dr. Morteza Mukherjee: Thank you for referring Quang Lainez to me for evaluation. Below are my notes for this consultation. If you have questions, please do not hesitate to call me. I look forward to following your patient along with you.          Sincerely,        Alex Hines MD        CC: No Recipients    Alex Hines MD  8/27/2023 11:35 AM  Sign when Signing Visit  Please refer to the [facility] ultrasound report in Ob Procedures for additional information regarding today's visit

## 2023-08-29 NOTE — PROGRESS NOTES
Non-Stress Testing:    Non-Stress test, equipment, procedure, and expected outcomes explained. Reviewed fetal kick counts and when to call OB. Verified patient understanding of fetal kick counts with teach back method. Patient reports feeling daily fetal movements. Patient has no questions or concerns.    NST reviewed by Dr. Dale Roberts

## 2023-08-29 NOTE — LETTER
NST sleeve cover sheet    Patient name: Alfonso Linares  : 1985  MRN: [id number]    ALIZE: Estimated Date of Delivery: 10/8/23    Obstetrician: _______________________________    Reason(s) for testing:  _____cHTN (prior pulmonary embolism, AMA)__________      Testing frequency:    ___ 2x/wk  ___ 1x/wk  ___ Dopplers  ___ BPP?       Last growth scan: __________________________________________

## 2023-08-31 ENCOUNTER — ROUTINE PRENATAL (OUTPATIENT)
Dept: OBGYN CLINIC | Facility: CLINIC | Age: 38
End: 2023-08-31
Payer: COMMERCIAL

## 2023-08-31 VITALS
BODY MASS INDEX: 36.12 KG/M2 | HEIGHT: 71 IN | WEIGHT: 258 LBS | DIASTOLIC BLOOD PRESSURE: 68 MMHG | SYSTOLIC BLOOD PRESSURE: 118 MMHG

## 2023-08-31 DIAGNOSIS — N76.0 BACTERIAL VAGINOSIS: ICD-10-CM

## 2023-08-31 DIAGNOSIS — B96.89 BACTERIAL VAGINOSIS: ICD-10-CM

## 2023-08-31 DIAGNOSIS — O10.919 CHRONIC HYPERTENSION AFFECTING PREGNANCY: ICD-10-CM

## 2023-08-31 DIAGNOSIS — O09.93 SUPERVISION OF HIGH RISK PREGNANCY IN THIRD TRIMESTER: ICD-10-CM

## 2023-08-31 DIAGNOSIS — Z14.8 CARRIER OF GENETIC DISORDER: ICD-10-CM

## 2023-08-31 DIAGNOSIS — O09.891 MEDICATION EXPOSURE DURING FIRST TRIMESTER OF PREGNANCY: ICD-10-CM

## 2023-08-31 DIAGNOSIS — Z3A.34 34 WEEKS GESTATION OF PREGNANCY: ICD-10-CM

## 2023-08-31 DIAGNOSIS — O26.03 EXCESSIVE WEIGHT GAIN DURING PREGNANCY IN THIRD TRIMESTER: Primary | ICD-10-CM

## 2023-08-31 DIAGNOSIS — R82.71 GROUP B STREPTOCOCCAL BACTERIURIA: ICD-10-CM

## 2023-08-31 LAB
BV WHIFF TEST VAG QL: ABNORMAL
CLUE CELLS SPEC QL WET PREP: PRESENT
PH SMN: 5 [PH]
SL AMB POCT WET MOUNT: ABNORMAL
T VAGINALIS VAG QL WET PREP: ABNORMAL
YEAST VAG QL WET PREP: ABNORMAL

## 2023-08-31 PROCEDURE — PNV: Performed by: OBSTETRICS & GYNECOLOGY

## 2023-08-31 PROCEDURE — 87210 SMEAR WET MOUNT SALINE/INK: CPT | Performed by: OBSTETRICS & GYNECOLOGY

## 2023-08-31 RX ORDER — ONDANSETRON 4 MG/1
4 TABLET, FILM COATED ORAL EVERY 8 HOURS PRN
Qty: 20 TABLET | Refills: 0 | Status: SHIPPED | OUTPATIENT
Start: 2023-08-31

## 2023-08-31 RX ORDER — METRONIDAZOLE 500 MG/1
500 TABLET ORAL EVERY 12 HOURS SCHEDULED
Qty: 14 TABLET | Refills: 0 | Status: SHIPPED | OUTPATIENT
Start: 2023-08-31 | End: 2023-09-07

## 2023-08-31 NOTE — ASSESSMENT & PLAN NOTE
Compliant with labetalol  BP highest 140's/80's, usually 130's/70's   No signs/sx superimposed preeclampsia  Had NST/STEPHANIE at [facility] earlier this week, continue weekly surveillance Yes

## 2023-08-31 NOTE — PROGRESS NOTES
S: 43 [facility]. [facility] 34[facility] here for routine prenatal visit. Chief Complaint   Patient presents with   • Routine Prenatal Visit     Having a lot of drool, nausea, having some discharge and tingling and cramping in groin. Having a lot of fatigue, still having headaches         OB complaints:  Contractions: intermittent , more at night. Increased pelvic pressure    Leakage: no  Bleeding: no  Fetal movement: yes    Yesterday started having increased grey vaginal discharge, slight odor, some burning/itching. Reports frequent BV in the past  Took diflucan about 2 weeks ago - sx improved but then returned       O:  /68   Ht 5' 11" (1.803 m)   Wt 117 kg (258 lb)   LMP 01/06/2023   BMI 35.98 kg/m²       Review of Systems   Constitutional: Negative for chills and fever. Eyes: Negative for visual disturbance. Respiratory: Negative for chest tightness and shortness of breath. Cardiovascular: Negative for chest pain. Gastrointestinal: Positive for nausea. Negative for abdominal pain, diarrhea and vomiting. Genitourinary: Positive for vaginal discharge. Negative for pelvic pain and vaginal bleeding. As noted in HPI   Skin: Negative for rash. Neurological: Negative for headaches. All other systems reviewed and are negative. Physical Exam  Constitutional:       General: She is not in acute distress. Appearance: Normal appearance. She is not ill-appearing. Genitourinary:      Bladder and urethral meatus normal.      Right Labia: No rash, tenderness, lesions or skin changes. Left Labia: No tenderness, lesions, skin changes or rash. No labial fusion noted. No inguinal adenopathy present in the right or left side. Vaginal discharge present. No vaginal erythema, tenderness, bleeding or ulceration. Vaginal exam comments: Homogenous white discharge. No cervical motion tenderness, discharge, friability, lesion, polyp or eversion.       Pelvic exam was performed with patient in the lithotomy position. HENT:      Head: Normocephalic. Cardiovascular:      Rate and Rhythm: Normal rate. Heart sounds: Normal heart sounds. Pulmonary:      Effort: Pulmonary effort is normal. No accessory muscle usage or respiratory distress. Abdominal:      General: There is no distension. Palpations: Abdomen is soft. There is no mass. Tenderness: There is no abdominal tenderness. There is no guarding or rebound. Musculoskeletal:         General: Normal range of motion. Cervical back: No rigidity. Lymphadenopathy:      Lower Body: No right inguinal adenopathy. No left inguinal adenopathy. Neurological:      General: No focal deficit present. Mental Status: She is alert. Mental status is at baseline. Skin:     General: Skin is warm and dry. Psychiatric:         Mood and Affect: Mood normal.         Behavior: Behavior normal.   Vitals and nursing note reviewed. Exam conducted with a chaperone present.            Fundal Height (cm): 36 cm  Fetal Heart Rate: 150    Pregravid Weight/BMI: 91.6 kg (202 lb) (BMI 28.19)  Current Weight: 117 kg (258 lb)   Total Weight Gain: 25.4 kg (56 lb)     Pre-Sania Vitals    Flowsheet Row Most Recent Value   Prenatal Assessment    Fetal Heart Rate 150   Fundal Height (cm) 36 cm   Movement Present   Prenatal Vitals    Blood Pressure 118/68   Weight - Scale 117 kg (258 lb)   Urine Albumin/Glucose    Dilation/Effacement/Station    Cervical Dilation 0   Cervical Effacement 0   Vaginal Drainage    Edema             Results for orders placed or performed in visit on 23   POCT wet mount   Result Value Ref Range    WET MOUNT -     Yeast, Wet Prep neg     pH 5     Whiff Test -     Clue Cells present     Trich, Wet Prep absent          Problem List        Digestive    GERD (gastroesophageal reflux disease)       Endocrine    PCOS (polycystic ovarian syndrome)    Impaired glucose tolerance during pregnancy    Overview glucola elevated at 27 weeks - 3 hr GTT normal             Respiratory    Mild intermittent asthma without complication    Overview     Inhalers PRN         Asthma    Overview     Inhalers PRN            Cardiovascular and Mediastinum    Chronic hypertension affecting pregnancy    Overview     No meds currently   Was previously on lisinopril and metoprolol - after weight loss no metoprolol   Stopped lisinopril January 2023   Baseline labs normal, UPC undetectable   Aspirin recommended  8/14 (32 weeks): started labetalol 200 BID  - weekly surveillance 32 weeks          Current Assessment & Plan     Compliant with labetalol  BP highest 140's/80's, usually 130's/70's   No signs/sx superimposed preeclampsia  Had NST/STEPHANIE at [facility] earlier this week, continue weekly surveillance          Migraine       Genitourinary    Interstitial cystitis    Bacterial vaginosis    Current Assessment & Plan     Exam findings today consistent with bacterial vaginosis infection. Recommend treatment with flagyl. vulvar hygiene measures and medication instructions reviewed. Other    History of pulmonary embolism    Overview     This was unprovoked in the setting of oral contraceptive use. She had a thorough thrombophilia work-up which can be seen in her lab reports from August 18, 2022 which was negative for inherited or acquired thrombophilia. She is recommended to continue Lovenox prophylaxis throughout her pregnancy and 6 weeks postpartum         Family history of breast cancer    Overview     PGM @ 78         Mass of lower inner quadrant of left breast    Overview      US 3/2023: Previously noted area of probable fat necrosis in the 7 o'clock position of the left breast, 5 cm from the nipple has significantly decreased in size currently measuring 5 x 4 x 7 mm.   6 month f/u scheduled 10/4/23         Supervision of high-risk pregnancy    Overview     ASA recommended  Flu vaccine:  completed  Covid vaccine:  completed X 5  NIPS/ MsAFP- negative  S/p tdap   Consents signed         Current Assessment & Plan     SVE as above, reviewed normal MSK discomforts of pregnancy  OB precautions reviewed         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    Overview     Patient is a carrier for CF, alpha thalassemia, Heather Ness - had carrier screening with [facility]enton  Partner testing:  pending         34 weeks gestation of pregnancy    Depression affecting pregnancy    Overview     Stopped topomax, buspar, prozac with + pregnancy  Behavioral health referral sent         Medication exposure during first trimester of pregnancy    Overview     Stopped topomax with + pregnancy test         Group B streptococcal bacteriuria    Overview     + GBS in urine @ 14 weeks:    Will need to treat in labor         Primigravida of advanced maternal age in third trimester    Excessive weight gain during pregnancy in third trimester    Overview     56 lbs as of 29 weeks                               Marquez Fry MD  8/31/2023  11:06 AM

## 2023-08-31 NOTE — ASSESSMENT & PLAN NOTE
Exam findings today consistent with bacterial vaginosis infection. Recommend treatment with flagyl. vulvar hygiene measures and medication instructions reviewed.

## 2023-09-01 ENCOUNTER — APPOINTMENT (OUTPATIENT)
Dept: LAB | Facility: CLINIC | Age: 38
End: 2023-09-01
Payer: COMMERCIAL

## 2023-09-01 ENCOUNTER — TELEPHONE (OUTPATIENT)
Dept: OBGYN CLINIC | Facility: CLINIC | Age: 38
End: 2023-09-01

## 2023-09-01 DIAGNOSIS — R25.2 LEG CRAMPING: Primary | ICD-10-CM

## 2023-09-01 DIAGNOSIS — R25.2 LEG CRAMPING: ICD-10-CM

## 2023-09-01 DIAGNOSIS — M79.606: ICD-10-CM

## 2023-09-01 DIAGNOSIS — O26.899: ICD-10-CM

## 2023-09-01 LAB
ANION GAP SERPL CALCULATED.3IONS-SCNC: 7 MMOL/L
BUN SERPL-MCNC: 7 MG/DL (ref 5–25)
CALCIUM SERPL-MCNC: 9.5 MG/DL (ref 8.4–10.2)
CHLORIDE SERPL-SCNC: 107 MMOL/L (ref 96–108)
CO2 SERPL-SCNC: 23 MMOL/L (ref 21–32)
CREAT SERPL-MCNC: 0.73 MG/DL (ref 0.6–1.3)
GFR SERPL CREATININE-BSD FRML MDRD: 104 ML/MIN/1.73SQ M
GLUCOSE P FAST SERPL-MCNC: 74 MG/DL (ref 65–99)
POTASSIUM SERPL-SCNC: 4 MMOL/L (ref 3.5–5.3)
SODIUM SERPL-SCNC: 137 MMOL/L (ref 135–147)

## 2023-09-01 PROCEDURE — 80048 BASIC METABOLIC PNL TOTAL CA: CPT

## 2023-09-01 PROCEDURE — 36415 COLL VENOUS BLD VENIPUNCTURE: CPT

## 2023-09-01 NOTE — TELEPHONE ENCOUNTER
Pt called requesting a lab order to check her Potassium level. Pt states last night she was woken up 4 times due to leg cramps and feeling like her legs were "on fire".  Pt states she has a hx of abnormal potassium levels

## 2023-09-08 ENCOUNTER — ULTRASOUND (OUTPATIENT)
Dept: PERINATAL CARE | Facility: OTHER | Age: 38
End: 2023-09-08
Payer: COMMERCIAL

## 2023-09-08 VITALS
HEIGHT: 71 IN | SYSTOLIC BLOOD PRESSURE: 132 MMHG | WEIGHT: 261.6 LBS | HEART RATE: 86 BPM | DIASTOLIC BLOOD PRESSURE: 78 MMHG | BODY MASS INDEX: 36.62 KG/M2

## 2023-09-08 DIAGNOSIS — Z3A.35 35 WEEKS GESTATION OF PREGNANCY: Primary | ICD-10-CM

## 2023-09-08 DIAGNOSIS — O09.513 PRIMIGRAVIDA OF ADVANCED MATERNAL AGE IN THIRD TRIMESTER: ICD-10-CM

## 2023-09-08 DIAGNOSIS — O10.919 CHRONIC HYPERTENSION AFFECTING PREGNANCY: ICD-10-CM

## 2023-09-08 PROCEDURE — 59025 FETAL NON-STRESS TEST: CPT | Performed by: OBSTETRICS & GYNECOLOGY

## 2023-09-08 PROCEDURE — 76815 OB US LIMITED FETUS(S): CPT | Performed by: OBSTETRICS & GYNECOLOGY

## 2023-09-08 NOTE — PROGRESS NOTES
Repeat Non-Stress Testing:    Patient verbalizes +FM. Pt denies ALL:               Leaking of fluid   Contractions   Vaginal bleeding   Decreased fetal movement    Patient is performing daily kick counts. Patient has no questions or concerns.    NST strip reviewed by Dr. Coty Rubio

## 2023-09-11 ENCOUNTER — TELEPHONE (OUTPATIENT)
Dept: OBGYN CLINIC | Facility: CLINIC | Age: 38
End: 2023-09-11

## 2023-09-11 ENCOUNTER — HOSPITAL ENCOUNTER (OUTPATIENT)
Facility: HOSPITAL | Age: 38
Discharge: HOME | End: 2023-09-11
Attending: STUDENT IN AN ORGANIZED HEALTH CARE EDUCATION/TRAINING PROGRAM | Admitting: STUDENT IN AN ORGANIZED HEALTH CARE EDUCATION/TRAINING PROGRAM
Payer: COMMERCIAL

## 2023-09-11 VITALS
RESPIRATION RATE: 18 BRPM | OXYGEN SATURATION: 98 % | TEMPERATURE: 99.1 F | HEART RATE: 89 BPM | DIASTOLIC BLOOD PRESSURE: 84 MMHG | SYSTOLIC BLOOD PRESSURE: 133 MMHG

## 2023-09-11 DIAGNOSIS — B96.89 BACTERIAL VAGINOSIS: Primary | ICD-10-CM

## 2023-09-11 DIAGNOSIS — N76.0 BACTERIAL VAGINOSIS: Primary | ICD-10-CM

## 2023-09-11 PROBLEM — Z3A.36 36 WEEKS GESTATION OF PREGNANCY: Status: ACTIVE | Noted: 2023-03-13

## 2023-09-11 PROCEDURE — NC001 PR NO CHARGE: Performed by: STUDENT IN AN ORGANIZED HEALTH CARE EDUCATION/TRAINING PROGRAM

## 2023-09-11 PROCEDURE — 99213 OFFICE O/P EST LOW 20 MIN: CPT

## 2023-09-11 PROCEDURE — G0463 HOSPITAL OUTPT CLINIC VISIT: HCPCS

## 2023-09-11 RX ORDER — METRONIDAZOLE 500 MG/1
500 TABLET ORAL EVERY 12 HOURS SCHEDULED
Qty: 14 TABLET | Refills: 0 | Status: SHIPPED | OUTPATIENT
Start: 2023-09-11 | End: 2023-09-22

## 2023-09-11 RX ORDER — ACETAMINOPHEN 325 MG/1
975 TABLET ORAL ONCE
Status: COMPLETED | OUTPATIENT
Start: 2023-09-11 | End: 2023-09-11

## 2023-09-11 RX ADMIN — ACETAMINOPHEN 975 MG: 325 TABLET ORAL at 19:19

## 2023-09-11 NOTE — TELEPHONE ENCOUNTER
Patient returned call to office. Patient states that her stomach is rock hard now and she is having the pains more frequently. Patient also reported back pain and a "cooling" sensation of the pelvis. Spoke with Dr. Conner Orellana who stated patient should go to L&D for evaluation. Patient agreeable. Charge RN and on call provider notified.

## 2023-09-11 NOTE — TELEPHONE ENCOUNTER
Called and informed patient of rx refill. Patient stated she is having some concerns. Patient states that she just started having mando figueredo contractions, vaginal burning and pressure. Spoke with Dr. Shanna Saucedo, who states patient should monitor contractions to see if they happen multiple times an hour. Patient verbalized understanding.

## 2023-09-11 NOTE — PROGRESS NOTES
L&D Triage Note - OB/GYN  Smooth Burgos 43 y.o. female MRN: [id number]  Unit/Bed#: L&D 329-02 Encounter: [id number]      ASSESSMENT:    Smooth Burgos is a 43 y.o. Praveen Agustin at 36w1d presents with pelvic pressure and vaginal discharge secondary to BV. SVE is 0/0/-3. Low suspicion for  labor at this time. Patient has picked up her script for flagyl and will begin taking it tonight. Suitable for discharge home with precautions    PLAN:    1) rule out labor   -SVE: 0/0/-3   -[place]: irritability   -FHT: reactive  2) BV   -flagyl 500 BID    3) Continue routine prenatal care  4) Discharge from [facility] triage with  labor precautions    - Reviewed rupture of membranes, false vs true labor, decreased fetal movement, and vaginal bleeding   - Pt to call provider with any concerns and follow up at her next scheduled prenatal appointment    - Case discussed with Dr. Love Stevens:    Smooth Burgos 43 y.o. Praveen Agustin at 36w1d with an Estimated Date of Delivery: 10/8/23 She comes in with concerns of increased pelvic pressure and some cramping. She has also been dealing with BV that she is taking flagyl for. The cramping has been on/off since this morning. She endorses good fetal movement. Denies leaking fluid or vaginal bleeding.     Her current obstetrical history is significant for CHTN on labetalol 200, BV, asthma, GBS positive    Contractions: as per HPI  Leakage of fluid: denies  Vaginal Bleeding: denies  Fetal movement: present    OBJECTIVE:    Vitals:    23 1839   BP: 133/84   Pulse: 89   Resp: 18   Temp: 99.1 °F (37.3 °C)   SpO2: 98%       ROS:  Constitutional: Negative  Respiratory: Negative  Cardiovascular: Negative    Gastrointestinal: Negative    General Physical Exam:  General: in no apparent distress, alert and cooperative  Cardiovascular: Cor RRR  Lungs: non-labored breathing  Abdomen: abdomen is soft without significant tenderness, masses, organomegaly or guarding  Lower extremeties: nontender    Cervical Exam:  SVE: 0 / 0% / -3    Fetal monitoring:  FHT:  135 bpm/ Moderate 6 - 25 bpm / 15 x 15 accelerations present, no decelerations  [place]: Irritability     Emma Art MD  OBGYN PGY-2  9/11/2023 6:55 PM

## 2023-09-11 NOTE — TELEPHONE ENCOUNTER
Patient called in asking if she can have a refill on the flagyl. Patient states she is having increased discharged and itching.

## 2023-09-13 ENCOUNTER — CLINICAL SUPPORT (OUTPATIENT)
Dept: POSTPARTUM | Facility: CLINIC | Age: 38
End: 2023-09-13

## 2023-09-13 DIAGNOSIS — Z32.2 ENCOUNTER FOR CHILDBIRTH INSTRUCTION: Primary | ICD-10-CM

## 2023-09-13 NOTE — PROGRESS NOTES
OB/GYN  PN Visit  Britni Lopez  [id number]  9/15/2023  4:12 PM  Dr. Tena Mcintosh MD    S: 43 y.o.  36w5d here for PN visit. Chief Complaint   Patient presents with   • Routine Prenatal Visit     Having itching, burning, and discharge. Taking keflex daily. And flagyl. OB complaints:  Contractions: no  Leakage: no  Bleeding: no  Fetal movement: yes      O:  /80 (BP Location: Right arm, Patient Position: Sitting, Cuff Size: Large)   Pulse 59   Ht 5' 11" (1.803 m)   Wt 120 kg (265 lb)   LMP 2023   BMI 36.96 kg/m²       Review of Systems   Constitutional: Negative. HENT: Negative. Eyes: Negative. Respiratory: Negative. Cardiovascular: Negative. Gastrointestinal: Negative. Endocrine: Negative. Genitourinary:        As noted in HPI   Musculoskeletal: Negative. Skin: Negative. Allergic/Immunologic: Negative. Neurological: Negative. Hematological: Negative. Psychiatric/Behavioral: Negative. Physical Exam  Constitutional:       General: She is not in acute distress. Appearance: She is well-developed. Genitourinary:      Right Labia: No rash. Left Labia: No rash. No vaginal discharge. Abdominal:      Palpations: Abdomen is soft. Tenderness: There is no abdominal tenderness. There is no guarding. Neurological:      Mental Status: She is alert and oriented to person, place, and time. Skin:     General: Skin is warm and dry.    Psychiatric:         Behavior: Behavior normal.             Pregravid Weight/BMI: 91.6 kg (202 lb) (BMI 28.19)  Current Weight: 120 kg (265 lb)   Total Weight Gain: 28.6 kg (63 lb)   Pre- Vitals    Flowsheet Row Most Recent Value   Prenatal Assessment    Fetal Heart Rate 135   Movement Present   Presentation Vertex   Prenatal Vitals    Blood Pressure 144/80   Weight - Scale 120 kg (265 lb)   Urine Albumin/Glucose    Dilation/Effacement/Station    Vaginal Drainage    Edema Problem List        Digestive    GERD (gastroesophageal reflux disease)       Endocrine    PCOS (polycystic ovarian syndrome)    Impaired glucose tolerance during pregnancy    Overview     glucola elevated at 27 weeks - 3 hr GTT normal             Respiratory    Mild intermittent asthma without complication    Overview     Inhalers PRN         Asthma    Overview     Inhalers PRN            Cardiovascular and Mediastinum    Chronic hypertension affecting pregnancy    Overview     No meds currently   Was previously on lisinopril and metoprolol - after weight loss no metoprolol   Stopped lisinopril January 2023   Baseline labs normal, UPC undetectable   Aspirin recommended  8/14 (32 weeks): started labetalol 200 BID  - weekly surveillance 32 weeks          Migraine       Genitourinary    Interstitial cystitis    Bacterial vaginosis       Other    History of pulmonary embolism    Overview     This was provoked in the setting of oral contraceptive use. She had a thorough thrombophilia work-up which can be seen in her lab reports from August 18, 2022 which was negative for inherited or acquired thrombophilia. She is recommended to continue Lovenox prophylaxis throughout her pregnancy and 6 weeks postpartum         Family history of breast cancer    Overview     PGM @ 78         Mass of lower inner quadrant of left breast    Overview      US 3/2023: Previously noted area of probable fat necrosis in the 7 o'clock position of the left breast, 5 cm from the nipple has significantly decreased in size currently measuring 5 x 4 x 7 mm.   6 month f/u scheduled 10/4/23         Supervision of high-risk pregnancy    Overview     ASA recommended  Flu vaccine:  completed  Covid vaccine:  completed X 5  NIPS/ MsAFP- negative  S/p tdap   Consents signed         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    Overview     Patient is a carrier for CF, alpha thalassemia, Cathryn Diane - had carrier screening with [facility]  Partner testing: pending         36 weeks gestation of pregnancy    Depression affecting pregnancy    Overview     Stopped topomax, buspar, prozac with + pregnancy  Behavioral health referral sent         Medication exposure during first trimester of pregnancy    Overview     Stopped topomax with + pregnancy test         Group B streptococcal bacteriuria    Overview     + GBS in urine @ 14 weeks: Will need to treat in labor         Primigravida of advanced maternal age in third trimester    Excessive weight gain during pregnancy in third trimester    Overview     56 lbs as of 34 weeks         Other Visit Diagnoses     UTI symptoms    -  Primary    Third trimester pregnancy        Supervision of high-risk pregnancy, third trimester        Vaginal itching        BV (bacterial vaginosis)             Patient with bacterial vaginosis. Patient states she does have Flagyl for now and will continue this. I did send a refill to her pharmacy. Continue Keflex for recurrent UTI and will repeat urinalysis and urine culture now. She is currently on labetalol 200 mg twice daily. Per MFM, induction of labor is recommended at 37 weeks. She was scheduled for 37 and 1 over 7 days. Preeclampsia labs were ordered and preeclampsia precautions were given. Induction consent was signed. Discussed induction process, risks and benefits. Patient was advised to stop Lovenox day before scheduled induction.   Patient was scheduled for induction on 9/18/2023 at 8 PM.      Future Appointments   Date Time Provider [id number]  46[facility]   9/15/2023  4:20 PM [facility] CHAIR 1 [facility]   9/20/2023  3:00 PM Liliana Stokes MD [facility]   10/4/2023  9:00 AM [facility] MOB 1 OW MOB US OW MOB               Tete Beckwith MD  9/15/2023  4:12 PM

## 2023-09-13 NOTE — PATIENT INSTRUCTIONS
Pregnancy at 28 to [facility]:   You are considered full term at the beginning of 37 weeks. Your breathing may be easier if your baby has moved down into a head-down position. You may need to urinate more often because the baby may be pressing on your bladder. You may also feel more discomfort and get tired easily. DISCHARGE INSTRUCTIONS:   Return to the emergency department if:   You develop a severe headache that does not go away. You have new or increased vision changes, such as blurred or spotted vision. You have new or increased swelling in your face or hands. You have vaginal spotting or bleeding. Your water broke or you feel warm water gushing or trickling from your vagina. Call your obstetrician if:   You have more than 5 contractions in 1 hour. You notice any changes in your baby's movements. You have abdominal cramps, pressure, or tightening. You have a change in vaginal discharge. You have chills or a fever. You have vaginal itching, burning, or pain. You have yellow, green, white, or foul-smelling vaginal discharge. You have pain or burning when you urinate, less urine than usual, or pink or bloody urine. You have questions or concerns about your condition or care. How to care for yourself at this stage of your pregnancy:       Eat a variety of healthy foods. Healthy foods include fruits, vegetables, whole-grain breads, low-fat dairy foods, beans, lean meats, and fish. Drink liquids as directed. Ask how much liquid to drink each day and which liquids are best for you. Limit caffeine to less than 200 milligrams each day. Limit your intake of fish to 2 servings each week. Choose fish low in mercury such as canned light tuna, shrimp, salmon, cod, or tilapia. Do not  eat fish high in mercury such as swordfish, tilefish, elvis mackerel, and shark. Take prenatal vitamins as directed.   Your need for certain vitamins and minerals, such as folic acid, increases during pregnancy. Prenatal vitamins provide some of the extra vitamins and minerals you need. Prenatal vitamins may also help to decrease the risk of certain birth defects. Rest as needed. Put your feet up if you have swelling in your ankles and feet. Talk to your healthcare provider about exercise. Moderate exercise can help you stay fit. Your healthcare provider will help you plan an exercise program that is safe for you during pregnancy. Do not smoke. Smoking increases your risk of a miscarriage and other health problems during your pregnancy. Smoking can cause your baby to be born early or weigh less at birth. Ask your healthcare provider for information if you need help quitting. Do not drink alcohol. Alcohol passes from your body to your baby through the placenta. It can affect your baby's brain development and cause fetal alcohol syndrome (FAS). FAS is a group of conditions that causes mental, behavior, and growth problems. Talk to your healthcare provider before you take any medicines. Many medicines may harm your baby if you take them when you are pregnant. Do not take any medicines, vitamins, herbs, or supplements without first talking to your healthcare provider. Never use illegal or street drugs (such as marijuana or cocaine) while you are pregnant. Safety tips:   Avoid hot tubs and saunas. Do not use a hot tub or sauna while you are pregnant, especially during your first trimester. Hot tubs and saunas may raise your baby's temperature and increase the risk of birth defects. Avoid toxoplasmosis. This is an infection caused by eating raw meat or being around infected cat feces. It can cause birth defects, miscarriages, and other problems. Wash your hands after you touch raw meat. Make sure any meat is well-cooked before you eat it. Avoid raw eggs and unpasteurized milk.  Use gloves or ask someone else to clean your cat's litter box while you are pregnant. Ask your healthcare provider about travel. The most comfortable time to travel is during the second trimester. Ask your provider if you can travel after 36 weeks. You may not be able to travel in an airplane after 36 weeks. He or she may also recommend you avoid long road trips. Changes happening with your baby:  By 38 weeks, your baby may weigh between 6 and 9 pounds. Your baby may be about 14 inches long from the top of the head to the rump (baby's bottom). Your baby hears well enough to know your voice. As your baby gets larger, you may feel fewer kicks and more stretching and rolling. Your baby may move into a head-down position. Your baby will also rest lower in your abdomen. What you need to know about prenatal care: Your healthcare provider will check your blood pressure and weight. You may also need the following:  A urine test  may also be done to check for sugar and protein. These can be signs of gestational diabetes or infection. Protein in your urine may also be a sign of preeclampsia. Preeclampsia is a condition that can develop during week 20 or later of your pregnancy. It causes high blood pressure, and it can cause problems with your kidneys and other organs. A gestational diabetes screen  may be done. Your healthcare provider may order either a 1-step or 2-step oral glucose tolerance test (OGTT). 1-step OGTT:  Your blood sugar level will be tested after you have not eaten for 8 hours (fasting). You will then be given a glucose drink. Your level will be tested again 1 hour and 2 hours after you finish the drink. 2-step OGTT:  You do not have to fast for the first part of the test. You will have the glucose drink at any time of day. Your blood sugar level will be checked 1 hour later. If your blood sugar is higher than a certain level, another test will be ordered. You will fast and your blood sugar level will be tested. You will have the glucose drink.  Your blood will be tested again 1 hour, 2 hours, and 3 hours after you finish the glucose drink. A blood test  may be done to check for anemia (low iron level). A Tdap vaccine  may be recommended by your healthcare provider. A group B strep test  is a test that is done to check for group B strep infection. Group B strep is a type of bacteria that may be found in the vagina or rectum. It can be passed to your baby during delivery if you have it. Your healthcare provider will take swab your vagina or rectum and send the sample to the lab for tests. Fundal height  is a measurement of your uterus to check your baby's growth. This number is usually the same as the number of weeks that you have been pregnant. Your healthcare provider may also check your baby's position. Your baby's heart rate  will be checked. Follow up with your obstetrician as directed:  Write down your questions so you remember to ask them during your visits. © Copyright [employer] 2022 Information is for End User's use only and may not be sold, redistributed or otherwise used for commercial purposes. The above information is an  only. It is not intended as medical advice for individual conditions or treatments. Talk to your doctor, nurse or pharmacist before following any medical regimen to see if it is safe and effective for you. Induction of Labor    What is Induction of Labor? Induction of labor is when labor is started (induced) before it begins on its own. Medicines and other methods are used to start contractions and help your cervix soften, thin (efface), and dilate (open). You may be given antibiotics to fight a bacterial infection you have or prevent an infection during delivery. There are two types:  When labor is started due to a medical problem you or the baby have, this is called an indicated induction. This may happen if the risk of a longer pregnancy is greater then delivering the baby early.   If labor is started for convenience it is called an elective induction. When can you have an elective induction? Before a decision can be made for an elective induction of labor, you healthcare provider must assess the following information about your pregnancy:  Due date  At least 39 weeks pregnant  Is your cervix showing signs of being ready for labor? Confirm that you have not had a previous  section or major surgery on your uterus. How is labor induced? There are several ways to induce labor. Your doctor will choose what's best for you. Stripping membranes   The doctor uses the tip of a finger to separate the amniotic membrane from the wall of your uterus while checking on your cervix. This causes your body to release hormones called prostaglandins. Prostaglandins help the cervix become soft, thin and ready for labor. Breaking your water  The doctor uses a plastic hook to make a small hole in the amniotic sac (bag of water)  F[place]man Balloon  This is a small catheter with a balloon on the end which is inserted into the vagina. The balloon is inflated with saline to help the cervix expand. The intention is to mechanically dilate the cervix to a point where it is safe to start Pitocin. You may be out of bed with this method. In fact it is encouraged since gravity will assist in moving the balloon down through your cervix. Medications  Cytotec  This is used when your cervix is still closed and thick. It is a small tablet inserted into your vagina by the physician. This tablet may be inserted every three hours as needed until your cervix opens and thins. After the medication is placed you will be on the fetal heart monitor for a period of time. You will need to be in bed for this. After monitoring is over you will be allowed to walk or sit in a chair.  The fetal monitoring can be performed intermittently thereafter, unless you go into active labor or there is a change in your or the baby's condition. Pitocin  This is an IV medication that is administered slowly through a pump and increased in small increments until a productive pattern of labor is achieved. The goal is to cause your contractions to begin and to stay strong and regular. Your baby will need to be constantly monitored on this medication. You may either be in the bed, a chair or on the birthing ball, as long as the baby's heartrate can be monitored safely. What are the risks of an induction? The baby may need to go to the  Intensive Care Unit (NICU). The baby's lungs may not be mature. The baby may have trouble with control of body temperature  You may have a longer labor. You have an increased risk of having a  section. Medicines used to induce labor may cause too many contractions. This can lower your baby's heartbeat and decrease his or her oxygen supply. Induction of labor may also increase the risk of umbilical cord prolapse. This condition causes the umbilical cord to slip into the vagina before delivery. It can compress the cord and decrease your baby's oxygen supply. If this were to happen, you will need a   Medical induction may cause an infection in you or your baby. Preparing for your Induction    Overnight Induction    When you are scheduled for an overnight induction, it is most likely because your cervix is closed. Your body is not ready for labor and therefore you may need medication overnight to prepare your cervix for the Pitocin in the morning. This type of induction can take a day, or more of labor. This is normal. We are asking  your body do something it is not quite ready to do yet. For an overnight induction please eat a regular dinner before you come in. Once you arrive at the hospital you will be on a clear liquid diet until you deliver the baby. That will consist of juices (not orange juice), soda, broths, popsicles, Jello and water ice.      Morning Induction  Eat a regular diet up until 2 hours before your induction. Ater that, only clear fluids (see above). Please be prepared for an induction to take a day or two until you deliver your baby. As mentioned earlier, we are asking your body to do something it is not quite ready to do yet.

## 2023-09-14 ENCOUNTER — TELEPHONE (OUTPATIENT)
Dept: NEUROLOGY | Facility: CLINIC | Age: 38
End: 2023-09-14

## 2023-09-14 NOTE — TELEPHONE ENCOUNTER
Called patient and left voicemail to confirm their upcoming appointment with Dr. Teetee Sharif. Informed patient about calling 15 minutes prior to appointment to get checked in and go over chart.

## 2023-09-15 ENCOUNTER — ROUTINE PRENATAL (OUTPATIENT)
Dept: OBGYN CLINIC | Facility: CLINIC | Age: 38
End: 2023-09-15
Payer: COMMERCIAL

## 2023-09-15 ENCOUNTER — APPOINTMENT (OUTPATIENT)
Dept: LAB | Facility: MEDICAL CENTER | Age: 38
End: 2023-09-15
Payer: COMMERCIAL

## 2023-09-15 ENCOUNTER — ROUTINE PRENATAL (OUTPATIENT)
Dept: PERINATAL CARE | Facility: OTHER | Age: 38
End: 2023-09-15
Payer: COMMERCIAL

## 2023-09-15 ENCOUNTER — ULTRASOUND (OUTPATIENT)
Dept: PERINATAL CARE | Facility: OTHER | Age: 38
End: 2023-09-15
Payer: COMMERCIAL

## 2023-09-15 VITALS
HEART RATE: 59 BPM | BODY MASS INDEX: 37.1 KG/M2 | SYSTOLIC BLOOD PRESSURE: 144 MMHG | HEIGHT: 71 IN | WEIGHT: 265 LBS | DIASTOLIC BLOOD PRESSURE: 80 MMHG

## 2023-09-15 VITALS
DIASTOLIC BLOOD PRESSURE: 80 MMHG | SYSTOLIC BLOOD PRESSURE: 146 MMHG | HEIGHT: 71 IN | BODY MASS INDEX: 37.04 KG/M2 | WEIGHT: 264.6 LBS | HEART RATE: 80 BPM

## 2023-09-15 DIAGNOSIS — O09.93 SUPERVISION OF HIGH-RISK PREGNANCY, THIRD TRIMESTER: ICD-10-CM

## 2023-09-15 DIAGNOSIS — R82.71 GROUP B STREPTOCOCCAL BACTERIURIA: ICD-10-CM

## 2023-09-15 DIAGNOSIS — Z3A.36 36 WEEKS GESTATION OF PREGNANCY: Primary | ICD-10-CM

## 2023-09-15 DIAGNOSIS — O26.03 EXCESSIVE WEIGHT GAIN DURING PREGNANCY IN THIRD TRIMESTER: ICD-10-CM

## 2023-09-15 DIAGNOSIS — R39.9 UTI SYMPTOMS: Primary | ICD-10-CM

## 2023-09-15 DIAGNOSIS — O99.340 DEPRESSION AFFECTING PREGNANCY: ICD-10-CM

## 2023-09-15 DIAGNOSIS — Z34.93 THIRD TRIMESTER PREGNANCY: ICD-10-CM

## 2023-09-15 DIAGNOSIS — O10.919 CHRONIC HYPERTENSION AFFECTING PREGNANCY: ICD-10-CM

## 2023-09-15 DIAGNOSIS — N89.8 VAGINAL ITCHING: ICD-10-CM

## 2023-09-15 DIAGNOSIS — O09.513 PRIMIGRAVIDA OF ADVANCED MATERNAL AGE IN THIRD TRIMESTER: ICD-10-CM

## 2023-09-15 DIAGNOSIS — O09.891 MEDICATION EXPOSURE DURING FIRST TRIMESTER OF PREGNANCY: ICD-10-CM

## 2023-09-15 DIAGNOSIS — Z86.711 HISTORY OF PULMONARY EMBOLISM: ICD-10-CM

## 2023-09-15 DIAGNOSIS — O09.93 SUPERVISION OF HIGH RISK PREGNANCY IN THIRD TRIMESTER: ICD-10-CM

## 2023-09-15 DIAGNOSIS — N76.0 BV (BACTERIAL VAGINOSIS): ICD-10-CM

## 2023-09-15 DIAGNOSIS — Z14.8 CARRIER OF GENETIC DISORDER: ICD-10-CM

## 2023-09-15 DIAGNOSIS — B96.89 BV (BACTERIAL VAGINOSIS): ICD-10-CM

## 2023-09-15 DIAGNOSIS — Z3A.36 36 WEEKS GESTATION OF PREGNANCY: ICD-10-CM

## 2023-09-15 DIAGNOSIS — F32.A DEPRESSION AFFECTING PREGNANCY: ICD-10-CM

## 2023-09-15 DIAGNOSIS — O10.919 CHRONIC HYPERTENSION AFFECTING PREGNANCY: Primary | ICD-10-CM

## 2023-09-15 LAB
ALBUMIN SERPL BCP-MCNC: 3.4 G/DL (ref 3.5–5)
ALP SERPL-CCNC: 64 U/L (ref 34–104)
ALT SERPL W P-5'-P-CCNC: 17 U/L (ref 7–52)
ANION GAP SERPL CALCULATED.3IONS-SCNC: 10 MMOL/L
AST SERPL W P-5'-P-CCNC: 16 U/L (ref 13–39)
BACTERIA UR QL AUTO: NORMAL /HPF
BILIRUB SERPL-MCNC: 0.34 MG/DL (ref 0.2–1)
BILIRUB UR QL STRIP: NEGATIVE
BUN SERPL-MCNC: 8 MG/DL (ref 5–25)
BV WHIFF TEST VAG QL: ABNORMAL
CALCIUM ALBUM COR SERPL-MCNC: 10.2 MG/DL (ref 8.3–10.1)
CALCIUM SERPL-MCNC: 9.7 MG/DL (ref 8.4–10.2)
CHLORIDE SERPL-SCNC: 106 MMOL/L (ref 96–108)
CLARITY UR: CLEAR
CLUE CELLS SPEC QL WET PREP: ABNORMAL
CO2 SERPL-SCNC: 22 MMOL/L (ref 21–32)
COLOR UR: NORMAL
CREAT SERPL-MCNC: 0.82 MG/DL (ref 0.6–1.3)
CREAT UR-MCNC: 35.3 MG/DL
ERYTHROCYTE [DISTWIDTH] IN BLOOD BY AUTOMATED COUNT: 15 % (ref 11.6–15.1)
GFR SERPL CREATININE-BSD FRML MDRD: 90 ML/MIN/1.73SQ M
GLUCOSE SERPL-MCNC: 76 MG/DL (ref 65–140)
GLUCOSE UR STRIP-MCNC: NEGATIVE MG/DL
HCT VFR BLD AUTO: 37 % (ref 34.8–46.1)
HGB BLD-MCNC: 12.1 G/DL (ref 11.5–15.4)
HGB UR QL STRIP.AUTO: NEGATIVE
KETONES UR STRIP-MCNC: NEGATIVE MG/DL
LEUKOCYTE ESTERASE UR QL STRIP: NEGATIVE
MCH RBC QN AUTO: 28.7 PG (ref 26.8–34.3)
MCHC RBC AUTO-ENTMCNC: 32.7 G/DL (ref 31.4–37.4)
MCV RBC AUTO: 88 FL (ref 82–98)
NITRITE UR QL STRIP: NEGATIVE
NON-SQ EPI CELLS URNS QL MICRO: NORMAL /HPF
PH SMN: 4.5 [PH]
PH UR STRIP.AUTO: 6.5 [PH]
PLATELET # BLD AUTO: 250 THOUSANDS/UL (ref 149–390)
PMV BLD AUTO: 11.2 FL (ref 8.9–12.7)
POTASSIUM SERPL-SCNC: 3.5 MMOL/L (ref 3.5–5.3)
PROT SERPL-MCNC: 6.4 G/DL (ref 6.4–8.4)
PROT UR STRIP-MCNC: NEGATIVE MG/DL
PROT UR-MCNC: 5 MG/DL
PROT/CREAT UR: 0.14 MG/G{CREAT} (ref 0–0.1)
RBC # BLD AUTO: 4.22 MILLION/UL (ref 3.81–5.12)
RBC #/AREA URNS AUTO: NORMAL /HPF
SODIUM SERPL-SCNC: 138 MMOL/L (ref 135–147)
SP GR UR STRIP.AUTO: 1.01 (ref 1–1.03)
T VAGINALIS VAG QL WET PREP: ABNORMAL
UROBILINOGEN UR STRIP-ACNC: <2 MG/DL
WBC # BLD AUTO: 13.44 THOUSAND/UL (ref 4.31–10.16)
WBC #/AREA URNS AUTO: NORMAL /HPF
YEAST VAG QL WET PREP: ABNORMAL

## 2023-09-15 PROCEDURE — 80053 COMPREHEN METABOLIC PANEL: CPT

## 2023-09-15 PROCEDURE — 81514 NFCT DS BV&VAGINITIS DNA ALG: CPT | Performed by: OBSTETRICS & GYNECOLOGY

## 2023-09-15 PROCEDURE — 87210 SMEAR WET MOUNT SALINE/INK: CPT | Performed by: OBSTETRICS & GYNECOLOGY

## 2023-09-15 PROCEDURE — NC001 PR NO CHARGE: Performed by: OBSTETRICS & GYNECOLOGY

## 2023-09-15 PROCEDURE — 85027 COMPLETE CBC AUTOMATED: CPT

## 2023-09-15 PROCEDURE — 82570 ASSAY OF URINE CREATININE: CPT | Performed by: OBSTETRICS & GYNECOLOGY

## 2023-09-15 PROCEDURE — PNV: Performed by: OBSTETRICS & GYNECOLOGY

## 2023-09-15 PROCEDURE — 76816 OB US FOLLOW-UP PER FETUS: CPT | Performed by: OBSTETRICS & GYNECOLOGY

## 2023-09-15 PROCEDURE — 59025 FETAL NON-STRESS TEST: CPT | Performed by: OBSTETRICS & GYNECOLOGY

## 2023-09-15 PROCEDURE — 84156 ASSAY OF PROTEIN URINE: CPT | Performed by: OBSTETRICS & GYNECOLOGY

## 2023-09-15 PROCEDURE — 81001 URINALYSIS AUTO W/SCOPE: CPT | Performed by: OBSTETRICS & GYNECOLOGY

## 2023-09-15 PROCEDURE — 36415 COLL VENOUS BLD VENIPUNCTURE: CPT

## 2023-09-15 PROCEDURE — 87086 URINE CULTURE/COLONY COUNT: CPT | Performed by: OBSTETRICS & GYNECOLOGY

## 2023-09-15 RX ORDER — METRONIDAZOLE 500 MG/1
500 TABLET ORAL EVERY 12 HOURS SCHEDULED
Qty: 14 TABLET | Refills: 0 | Status: SHIPPED | OUTPATIENT
Start: 2023-09-15 | End: 2023-09-22

## 2023-09-16 ENCOUNTER — DOCUMENTATION (OUTPATIENT)
Dept: LABOR AND DELIVERY | Facility: HOSPITAL | Age: 38
End: 2023-09-16

## 2023-09-16 DIAGNOSIS — B37.9 YEAST DETECTED: Primary | ICD-10-CM

## 2023-09-16 LAB
C GLABRATA DNA VAG QL NAA+PROBE: NEGATIVE
C KRUSEI DNA VAG QL NAA+PROBE: NEGATIVE
CANDIDA SP 6 PNL VAG NAA+PROBE: POSITIVE
T VAGINALIS DNA VAG QL NAA+PROBE: NEGATIVE
VAGINOSIS/ITIS DNA PNL VAG PROBE+SIG AMP: NEGATIVE

## 2023-09-16 RX ORDER — FLUCONAZOLE 150 MG/1
150 TABLET ORAL ONCE
Qty: 1 TABLET | Refills: 0 | Status: SHIPPED | OUTPATIENT
Start: 2023-09-16 | End: 2023-09-16

## 2023-09-17 LAB — BACTERIA UR CULT: NORMAL

## 2023-09-18 ENCOUNTER — HOSPITAL ENCOUNTER (INPATIENT)
Facility: HOSPITAL | Age: 38
LOS: 4 days | Discharge: HOME | End: 2023-09-22
Attending: OBSTETRICS & GYNECOLOGY | Admitting: OBSTETRICS & GYNECOLOGY
Payer: COMMERCIAL

## 2023-09-18 ENCOUNTER — HOSPITAL ENCOUNTER (OUTPATIENT)
Dept: LABOR AND DELIVERY | Facility: HOSPITAL | Age: 38
Discharge: HOME | End: 2023-09-18
Payer: COMMERCIAL

## 2023-09-18 DIAGNOSIS — Z3A.37 37 WEEKS GESTATION OF PREGNANCY: Primary | ICD-10-CM

## 2023-09-18 LAB
ABO GROUP BLD: NORMAL
ALBUMIN SERPL BCP-MCNC: 3.5 G/DL (ref 3.5–5)
ALP SERPL-CCNC: 60 U/L (ref 34–104)
ALT SERPL W P-5'-P-CCNC: 17 U/L (ref 7–52)
ANION GAP SERPL CALCULATED.3IONS-SCNC: 7 MMOL/L
AST SERPL W P-5'-P-CCNC: 15 U/L (ref 13–39)
BILIRUB SERPL-MCNC: 0.23 MG/DL (ref 0.2–1)
BLD GP AB SCN SERPL QL: NEGATIVE
BUN SERPL-MCNC: 10 MG/DL (ref 5–25)
CALCIUM SERPL-MCNC: 9.8 MG/DL (ref 8.4–10.2)
CHLORIDE SERPL-SCNC: 107 MMOL/L (ref 96–108)
CO2 SERPL-SCNC: 21 MMOL/L (ref 21–32)
CREAT SERPL-MCNC: 0.75 MG/DL (ref 0.6–1.3)
CREAT UR-MCNC: 36.5 MG/DL
ERYTHROCYTE [DISTWIDTH] IN BLOOD BY AUTOMATED COUNT: 14.7 % (ref 11.6–15.1)
GFR SERPL CREATININE-BSD FRML MDRD: 100 ML/MIN/1.73SQ M
GLUCOSE SERPL-MCNC: 85 MG/DL (ref 65–140)
HCT VFR BLD AUTO: 35.5 % (ref 34.8–46.1)
HGB BLD-MCNC: 11.8 G/DL (ref 11.5–15.4)
MCH RBC QN AUTO: 28.9 PG (ref 26.8–34.3)
MCHC RBC AUTO-ENTMCNC: 33.2 G/DL (ref 31.4–37.4)
MCV RBC AUTO: 87 FL (ref 82–98)
PLATELET # BLD AUTO: 250 THOUSANDS/UL (ref 149–390)
PMV BLD AUTO: 9.9 FL (ref 8.9–12.7)
POTASSIUM SERPL-SCNC: 3.5 MMOL/L (ref 3.5–5.3)
PROT SERPL-MCNC: 6.6 G/DL (ref 6.4–8.4)
PROT UR-MCNC: 5 MG/DL
PROT/CREAT UR: 0.14 MG/G{CREAT} (ref 0–0.1)
RBC # BLD AUTO: 4.09 MILLION/UL (ref 3.81–5.12)
RH BLD: POSITIVE
SODIUM SERPL-SCNC: 135 MMOL/L (ref 135–147)
SPECIMEN EXPIRATION DATE: NORMAL
WBC # BLD AUTO: 14.07 THOUSAND/UL (ref 4.31–10.16)

## 2023-09-18 PROCEDURE — 82570 ASSAY OF URINE CREATININE: CPT

## 2023-09-18 PROCEDURE — 84156 ASSAY OF PROTEIN URINE: CPT

## 2023-09-18 PROCEDURE — 86900 BLOOD TYPING SEROLOGIC ABO: CPT

## 2023-09-18 PROCEDURE — 86850 RBC ANTIBODY SCREEN: CPT

## 2023-09-18 PROCEDURE — 85027 COMPLETE CBC AUTOMATED: CPT

## 2023-09-18 PROCEDURE — 86901 BLOOD TYPING SEROLOGIC RH(D): CPT

## 2023-09-18 PROCEDURE — 86780 TREPONEMA PALLIDUM: CPT

## 2023-09-18 PROCEDURE — 80053 COMPREHEN METABOLIC PANEL: CPT

## 2023-09-18 PROCEDURE — NC001 PR NO CHARGE: Performed by: OBSTETRICS & GYNECOLOGY

## 2023-09-18 RX ORDER — BUPIVACAINE HYDROCHLORIDE 2.5 MG/ML
30 INJECTION, SOLUTION EPIDURAL; INFILTRATION; INTRACAUDAL ONCE AS NEEDED
Status: DISCONTINUED | OUTPATIENT
Start: 2023-09-18 | End: 2023-09-20

## 2023-09-18 RX ORDER — SODIUM CHLORIDE, SODIUM LACTATE, POTASSIUM CHLORIDE, CALCIUM CHLORIDE 600; 310; 30; 20 MG/100ML; MG/100ML; MG/100ML; MG/100ML
125 INJECTION, SOLUTION INTRAVENOUS CONTINUOUS
Status: DISCONTINUED | OUTPATIENT
Start: 2023-09-18 | End: 2023-09-20

## 2023-09-18 RX ORDER — ONDANSETRON 2 MG/ML
4 INJECTION INTRAMUSCULAR; INTRAVENOUS EVERY 6 HOURS PRN
Status: DISCONTINUED | OUTPATIENT
Start: 2023-09-18 | End: 2023-09-20

## 2023-09-18 RX ORDER — ALBUTEROL SULFATE 90 UG/1
2 AEROSOL, METERED RESPIRATORY (INHALATION) EVERY 4 HOURS PRN
Status: DISCONTINUED | OUTPATIENT
Start: 2023-09-18 | End: 2023-09-22 | Stop reason: HOSPADM

## 2023-09-18 RX ORDER — LABETALOL 200 MG/1
200 TABLET, FILM COATED ORAL 2 TIMES DAILY
Status: DISCONTINUED | OUTPATIENT
Start: 2023-09-18 | End: 2023-09-22 | Stop reason: HOSPADM

## 2023-09-18 RX ADMIN — LABETALOL HYDROCHLORIDE 200 MG: 200 TABLET, FILM COATED ORAL at 22:20

## 2023-09-18 RX ADMIN — SODIUM CHLORIDE, SODIUM LACTATE, POTASSIUM CHLORIDE, AND CALCIUM CHLORIDE 125 ML/HR: .6; .31; .03; .02 INJECTION, SOLUTION INTRAVENOUS at 21:47

## 2023-09-18 RX ADMIN — Medication 50 MCG: at 21:46

## 2023-09-18 RX ADMIN — SODIUM CHLORIDE 5 MILLION UNITS: 0.9 INJECTION, SOLUTION INTRAVENOUS at 21:47

## 2023-09-19 ENCOUNTER — ANESTHESIA EVENT (INPATIENT)
Dept: ANESTHESIOLOGY | Facility: HOSPITAL | Age: 38
End: 2023-09-19
Payer: COMMERCIAL

## 2023-09-19 ENCOUNTER — ANESTHESIA (INPATIENT)
Dept: ANESTHESIOLOGY | Facility: HOSPITAL | Age: 38
End: 2023-09-19
Payer: COMMERCIAL

## 2023-09-19 LAB — TREPONEMA PALLIDUM IGG+IGM AB [PRESENCE] IN SERUM OR PLASMA BY IMMUNOASSAY: NORMAL

## 2023-09-19 PROCEDURE — 3E0P7VZ INTRODUCTION OF HORMONE INTO FEMALE REPRODUCTIVE, VIA NATURAL OR ARTIFICIAL OPENING: ICD-10-PCS | Performed by: OBSTETRICS & GYNECOLOGY

## 2023-09-19 PROCEDURE — 4A1HXCZ MONITORING OF PRODUCTS OF CONCEPTION, CARDIAC RATE, EXTERNAL APPROACH: ICD-10-PCS | Performed by: OBSTETRICS & GYNECOLOGY

## 2023-09-19 PROCEDURE — 3E033VJ INTRODUCTION OF OTHER HORMONE INTO PERIPHERAL VEIN, PERCUTANEOUS APPROACH: ICD-10-PCS | Performed by: OBSTETRICS & GYNECOLOGY

## 2023-09-19 PROCEDURE — 10907ZC DRAINAGE OF AMNIOTIC FLUID, THERAPEUTIC FROM PRODUCTS OF CONCEPTION, VIA NATURAL OR ARTIFICIAL OPENING: ICD-10-PCS | Performed by: OBSTETRICS & GYNECOLOGY

## 2023-09-19 RX ORDER — FENTANYL CITRATE 50 UG/ML
INJECTION, SOLUTION INTRAMUSCULAR; INTRAVENOUS
Status: COMPLETED
Start: 2023-09-19 | End: 2023-09-19

## 2023-09-19 RX ORDER — ROPIVACAINE HYDROCHLORIDE 2 MG/ML
INJECTION, SOLUTION EPIDURAL; INFILTRATION; PERINEURAL AS NEEDED
Status: DISCONTINUED | OUTPATIENT
Start: 2023-09-19 | End: 2023-09-20 | Stop reason: HOSPADM

## 2023-09-19 RX ORDER — OXYTOCIN/RINGER'S LACTATE 30/500 ML
1-30 PLASTIC BAG, INJECTION (ML) INTRAVENOUS
Status: DISCONTINUED | OUTPATIENT
Start: 2023-09-19 | End: 2023-09-20

## 2023-09-19 RX ORDER — ROPIVACAINE HYDROCHLORIDE 2 MG/ML
INJECTION, SOLUTION EPIDURAL; INFILTRATION; PERINEURAL CONTINUOUS PRN
Status: DISCONTINUED | OUTPATIENT
Start: 2023-09-19 | End: 2023-09-20 | Stop reason: HOSPADM

## 2023-09-19 RX ORDER — METOCLOPRAMIDE HYDROCHLORIDE 5 MG/ML
10 INJECTION INTRAMUSCULAR; INTRAVENOUS ONCE
Status: COMPLETED | OUTPATIENT
Start: 2023-09-19 | End: 2023-09-19

## 2023-09-19 RX ORDER — NALBUPHINE HYDROCHLORIDE 10 MG/ML
10 INJECTION, SOLUTION INTRAMUSCULAR; INTRAVENOUS; SUBCUTANEOUS ONCE AS NEEDED
Status: COMPLETED | OUTPATIENT
Start: 2023-09-19 | End: 2023-09-19

## 2023-09-19 RX ORDER — FENTANYL CITRATE 50 UG/ML
INJECTION, SOLUTION INTRAMUSCULAR; INTRAVENOUS AS NEEDED
Status: DISCONTINUED | OUTPATIENT
Start: 2023-09-19 | End: 2023-09-20 | Stop reason: HOSPADM

## 2023-09-19 RX ORDER — LIDOCAINE HYDROCHLORIDE AND EPINEPHRINE 15; 5 MG/ML; UG/ML
INJECTION, SOLUTION EPIDURAL AS NEEDED
Status: DISCONTINUED | OUTPATIENT
Start: 2023-09-19 | End: 2023-09-20 | Stop reason: HOSPADM

## 2023-09-19 RX ADMIN — LIDOCAINE HYDROCHLORIDE AND EPINEPHRINE 2 ML: 15; 5 INJECTION, SOLUTION EPIDURAL at 13:39

## 2023-09-19 RX ADMIN — NALBUPHINE HYDROCHLORIDE 10 MG: 10 INJECTION, SOLUTION INTRAMUSCULAR; INTRAVENOUS; SUBCUTANEOUS at 05:52

## 2023-09-19 RX ADMIN — SODIUM CHLORIDE 2.5 MILLION UNITS: 9 INJECTION, SOLUTION INTRAVENOUS at 05:40

## 2023-09-19 RX ADMIN — ROPIVACAINE HYDROCHLORIDE 5 ML: 2 INJECTION, SOLUTION EPIDURAL; INFILTRATION; PERINEURAL at 23:17

## 2023-09-19 RX ADMIN — SODIUM CHLORIDE, SODIUM LACTATE, POTASSIUM CHLORIDE, AND CALCIUM CHLORIDE 125 ML/HR: .6; .31; .03; .02 INJECTION, SOLUTION INTRAVENOUS at 22:17

## 2023-09-19 RX ADMIN — ROPIVACAINE HYDROCHLORIDE: 2 INJECTION, SOLUTION EPIDURAL; INFILTRATION at 13:57

## 2023-09-19 RX ADMIN — ROPIVACAINE HYDROCHLORIDE 8 ML/HR: 2 INJECTION, SOLUTION EPIDURAL; INFILTRATION at 13:46

## 2023-09-19 RX ADMIN — SODIUM CHLORIDE 2.5 MILLION UNITS: 9 INJECTION, SOLUTION INTRAVENOUS at 01:32

## 2023-09-19 RX ADMIN — Medication 2 MILLI-UNITS/MIN: at 11:12

## 2023-09-19 RX ADMIN — SODIUM CHLORIDE 2.5 MILLION UNITS: 9 INJECTION, SOLUTION INTRAVENOUS at 09:17

## 2023-09-19 RX ADMIN — ROPIVACAINE HYDROCHLORIDE 4 ML: 2 INJECTION, SOLUTION EPIDURAL; INFILTRATION; PERINEURAL at 13:41

## 2023-09-19 RX ADMIN — SODIUM CHLORIDE 2.5 MILLION UNITS: 9 INJECTION, SOLUTION INTRAVENOUS at 18:20

## 2023-09-19 RX ADMIN — ROPIVACAINE HYDROCHLORIDE: 2 INJECTION, SOLUTION EPIDURAL; INFILTRATION at 21:03

## 2023-09-19 RX ADMIN — LABETALOL HYDROCHLORIDE 200 MG: 200 TABLET, FILM COATED ORAL at 18:20

## 2023-09-19 RX ADMIN — FENTANYL CITRATE 100 MCG: 50 INJECTION INTRAMUSCULAR; INTRAVENOUS at 23:15

## 2023-09-19 RX ADMIN — ROPIVACAINE HYDROCHLORIDE 4 ML: 2 INJECTION, SOLUTION EPIDURAL; INFILTRATION; PERINEURAL at 13:38

## 2023-09-19 RX ADMIN — ROPIVACAINE HYDROCHLORIDE 5 ML: 2 INJECTION, SOLUTION EPIDURAL; INFILTRATION; PERINEURAL at 23:15

## 2023-09-19 RX ADMIN — LIDOCAINE HYDROCHLORIDE AND EPINEPHRINE 3 ML: 15; 5 INJECTION, SOLUTION EPIDURAL at 13:37

## 2023-09-19 RX ADMIN — LABETALOL HYDROCHLORIDE 200 MG: 200 TABLET, FILM COATED ORAL at 08:42

## 2023-09-19 RX ADMIN — Medication 25 MCG: at 02:20

## 2023-09-19 RX ADMIN — SODIUM CHLORIDE 2.5 MILLION UNITS: 9 INJECTION, SOLUTION INTRAVENOUS at 13:57

## 2023-09-19 RX ADMIN — METOCLOPRAMIDE 10 MG: 5 INJECTION, SOLUTION INTRAMUSCULAR; INTRAVENOUS at 16:17

## 2023-09-19 RX ADMIN — Medication 25 MCG: at 06:14

## 2023-09-19 RX ADMIN — SODIUM CHLORIDE 2.5 MILLION UNITS: 9 INJECTION, SOLUTION INTRAVENOUS at 22:18

## 2023-09-19 NOTE — ANESTHESIA PREPROCEDURE EVALUATION
Procedure:  LABOR ANALGESIA    Relevant Problems   CARDIO   (+) Chronic hypertension affecting pregnancy   (+) Migraine      GI/HEPATIC   (+) GERD (gastroesophageal reflux disease)      GYN   (+) 37 weeks gestation of pregnancy   (+) Supervision of high-risk pregnancy      NEURO/PSYCH   (+) Depression affecting pregnancy   (+) Migraine      PULMONARY   (+) Asthma   (+) Mild intermittent asthma without complication      Endocrine   (+) PCOS (polycystic ovarian syndrome)        Physical Exam    Airway    Mallampati score: II  TM Distance: >3 FB  Neck ROM: full     Dental   No notable dental hx     Cardiovascular  Rhythm: regular, Rate: normal, Cardiovascular exam normal    Pulmonary  Pulmonary exam normal Breath sounds clear to auscultation,     Other Findings        Anesthesia Plan  ASA Score- 2     Anesthesia Type- epidural with ASA Monitors. Additional Monitors:   Airway Plan:           Plan Factors-    Chart reviewed. Existing labs reviewed. Patient summary reviewed. Induction-     Postoperative Plan-     Informed Consent- Anesthetic plan and risks discussed with patient.

## 2023-09-19 NOTE — ASSESSMENT & PLAN NOTE
Lochia WNL   Recovering well   Appropriate bowel and bladder function   Pain well controlled   Tolerating diet   Ambulating without issues   No lower extremity tenderness  GBS bacteruria, adequately treated   Rh positive    Multiple Manual extractions of clots, s/p Cefoxitin 1g

## 2023-09-19 NOTE — H&P
[facility] 43 y.o. female MRN: [id number]  Unit/Bed#: L&D 323-01 Encounter: [id number]    Assessment: 43 y.o. Neptali Powell at 37w1d admitted for induction of labor secondary to gestational hypertension. SVE: 0/0/-3  FHT: reactive  Clinical EFW: 80% ; Cephalic confirmed by TAUS  GBS status: pos, PCN ordered     Plan:   * 37 weeks gestation of pregnancy  Assessment & Plan  Admit to OBN   Clear liquid diet   F/u T&S, CBC, RPR   IVF LR 125cc/hr   Continuous fetal monitoring and tocometry   Analgesia at maternal request   Vertex by TAUS  Induction plan: cytotec, hernandez, pit       Group B streptococcal bacteriuria  Assessment & Plan  PCN ordered    History of pulmonary embolism  Assessment & Plan  lovenox for 6w postpartum    Mild intermittent asthma without complication  Assessment & Plan  Albuterol PRN    Chronic hypertension affecting pregnancy  Assessment & Plan  F/u admission labs  Monitor blood pressures and for symptoms of preeclampsia    Discussed case and plan w/ Dr. Wilton Wisdom      Chief Complaint: none    HPI: Keith Barclay is a 43 y.o. Neptali Powell with an ALIZE of 10/8/2023, by Ultrasound at 37w1d who is being admitted for induction of labor. She denies having uterine contractions, has no LOF, and reports no VB. She states she has felt good FM.     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 Eyes: Negative for visual disturbance. Respiratory: Negative for shortness of breath. Cardiovascular: Negative for chest pain. Gastrointestinal: Negative for abdominal pain, diarrhea, nausea and vomiting. Genitourinary: Negative for dysuria, flank pain, hematuria, vaginal bleeding and vaginal discharge. Skin: Negative for rash. Neurological: Negative for dizziness, numbness and headaches. All other systems reviewed and are negative.       OB Hx:  OB History    Para Term  AB Living   1 0 0 0 0 0   SAB IAB Ectopic Multiple Live Births   0 0 0 0 0      # Outcome Date GA Lbr Ed/2nd Weight Sex Delivery Anes PTL Lv   1 Current                Past Medical Hx:  Past Medical History:   Diagnosis Date   • Asthma    • Depression    • GERD (gastroesophageal reflux disease)    • Hypertension    • IC (interstitial cystitis)    • Migraines    • PCOS (polycystic ovarian syndrome)    • Pulmonary embolism ([address])    • Venous insufficiency        Past Surgical hx:  Past Surgical History:   Procedure Laterality Date   • ADENOIDECTOMY     • CHOLECYSTECTOMY     • LAPAROSCOPY     • TONSILLECTOMY         Social Hx:  Alcohol use: denies  Tobacco use: denies  Other substance use: denies    Allergies   Allergen Reactions   • Erythromycin Swelling   • Ethylenediamine Dermatitis, Hives, Itching and Swelling   • Propolis Dermatitis, Hives, Itching, Swelling and Rash   • Sumatriptan Chest Pain and Palpitations     to all TRIPTANS   • Triptans Palpitations     flushing       Medications Prior to Admission   Medication   • acetaminophen (TYLENOL) 325 mg tablet   • acetaZOLAMIDE (DIAMOX) 125 mg tablet   • aspirin-acetaminophen-caffeine (EXCEDRIN MIGRAINE) 250-250-65 MG per tablet   • cephalexin (KEFLEX) 500 mg capsule   • cyclobenzaprine (FLEXERIL) 10 mg tablet   • docusate sodium (COLACE) 250 MG capsule   • Doxylamine Succinate, Sleep, (UNISOM PO)   • enoxaparin (LOVENOX) 40 mg/0.4 mL   • fluticasone (FLONASE) 50 mcg/act nasal spray   • labetalol (NORMODYNE) 200 mg tablet   • loratadine (CLARITIN) 10 mg tablet   • MAGNESIUM CITRATE PO   • metoclopramide (Reglan) 10 mg tablet   • metroNIDAZOLE (FLAGYL) 500 mg tablet   • metroNIDAZOLE (FLAGYL) 500 mg tablet   • montelukast (SINGULAIR) 10 mg tablet   • ondansetron (ZOFRAN) 4 mg tablet   • Pyridoxine HCl (VITAMIN B6 PO)   • Riboflavin (B2) 100 MG TABS   • albuterol (Ventolin HFA) 90 mcg/act inhaler   • clotrimazole-betamethasone (LOTRISONE) 1-0.05 % cream   • lidocaine (LMX) 4 % cream   • ondansetron (ZOFRAN) 4 mg tablet   • potassium chloride (K-DUR,KLOR-CON) 20 mEq tablet       Objective:  Temp:  [98.4 °F (36.9 °C)] 98.4 °F (36.9 °C)  HR:  [75] 75  Resp:  [18] 18  BP: (143)/(86) 143/86  Body mass index is 36.54 kg/m². Physical Exam:  Physical Exam  Constitutional:       General: She is not in acute distress. Appearance: Normal appearance. HENT:      Head: Normocephalic and atraumatic. Eyes:      Extraocular Movements: Extraocular movements intact. Cardiovascular:      Rate and Rhythm: Normal rate. Pulses: Normal pulses. Pulmonary:      Effort: Pulmonary effort is normal. No respiratory distress. Abdominal:      Tenderness: There is no abdominal tenderness. There is no guarding. Comments: gravid   Musculoskeletal:         General: Normal range of motion. Cervical back: Normal range of motion. Neurological:      General: No focal deficit present. Mental Status: She is alert. Mental status is at baseline. Skin:     General: Skin is warm and dry. Psychiatric:         Mood and Affect: Mood normal.         Behavior: Behavior normal.   Vitals reviewed.             Lab Results   Component Value Date    WBC 14.07 (H) 09/18/2023    HGB 11.8 09/18/2023    HCT 35.5 09/18/2023     09/18/2023     Lab Results   Component Value Date    K 3.5 09/18/2023     09/18/2023    CO2 21 09/18/2023    BUN 10 09/18/2023    CREATININE 0.75 09/18/2023    AST 15 09/18/2023    ALT 17 09/18/2023     Prenatal Labs: Reviewed      Blood type: A pos  Antibody: neg  GBS: pos  HIV: NR  Rubella: immune  Syphilis IgM/IgG: NR  HBsAg: NR  HCAb: NR  Chlamydia: neg  Gonorrhea: neg  Diabetes 1 hour screen: elevated  3 hour glucose: wnl  Platelets: 596  Hgb: 11.8  >2 Midnights  INPATIENT     Signature/Title: Carol Estevez MD  Date: 9/18/2023  Time: 9:59 PM

## 2023-09-19 NOTE — OB LABOR/OXYTOCIN SAFETY PROGRESS
Oxytocin Safety Progress Check Note - Dorota Francois 43 y.o. female MRN: [id number]    Unit/Bed#: L&D 323-01 Encounter: [id number]    Dose (lindy-units/min) Oxytocin: 2 lindy-units/min  Contraction Frequency (minutes): difficulty tracing  Contraction Quality: Mild  Tachysystole: No   Cervical Dilation: 3        Cervical Effacement: 70  Fetal Station: -3  Baseline Rate: 135 bpm     FHR Category: I               Vital Signs:   Vitals:    09/19/23 0647   BP: 135/71   Pulse: 75   Resp:    Temp:        Notes/comments:   Notified by nursing that patient dislodged Feldman balloon. SVE as above. Better FHT tracing with external monitoring with patient in bed. Has been category 1. Pitocin was just started, plan to continue titration. Dr. Randolph Mail aware.      Florentin Vasquez MD 9/19/2023 11:22 AM

## 2023-09-19 NOTE — OB LABOR/OXYTOCIN SAFETY PROGRESS
Oxytocin Safety Progress Check Note - Adelso Kenney 43 y.o. female MRN: [id number]    Unit/Bed#: L&D 323-01 Encounter: [id number]    Dose (lindy-units/min) Oxytocin: 12 lindy-units/min  Contraction Frequency (minutes): 2-4  Contraction Quality: Mild  Tachysystole: No   Cervical Dilation: 4-5        Cervical Effacement: 70  Fetal Station: -2  Baseline Rate: 150 bpm  Fetal Heart Rate: 130 BPM  FHR Category: 1               Vital Signs:   Vitals:    09/19/23 1553   BP:    Pulse:    Resp:    Temp: 99 °F (37.2 °C)       Notes/comments:  Patient comfortable with epidural   AROM - clear fluid    Sujatha Lauren MD 9/19/2023 3:53 PM

## 2023-09-19 NOTE — OB LABOR/OXYTOCIN SAFETY PROGRESS
Oxytocin Safety Progress Check Note - Smooth Burgos 43 y.o. female MRN: [id number]    Unit/Bed#: L&D 323-01 Encounter: [id number]    Dose (lindy-units/min) Oxytocin: 12 lindy-units/min  Contraction Frequency (minutes): 2-3  Contraction Quality: Moderate  Tachysystole: No   Cervical Dilation: 6-7        Cervical Effacement: 80  Fetal Station: -1  Baseline Rate: 125 bpm  Fetal Heart Rate: 122 BPM  FHR Category: 1               Vital Signs:   Vitals:    09/19/23 1822   BP: 150/86   Pulse: 82   Resp:    Temp:        Notes/comments:   Comfortable, cervical change noted.       Rennis Mortimer, MD 9/19/2023 6:29 PM

## 2023-09-19 NOTE — OB LABOR/OXYTOCIN SAFETY PROGRESS
Oxytocin Safety Progress Check Note - Lucero King 43 y.o. female MRN: [id number]    Unit/Bed#: L&D 323-01 Encounter: [id number]    Dose (lindy-units/min) Oxytocin: 12 lindy-units/min  Contraction Frequency (minutes): 2-3  Contraction Quality: Moderate  Tachysystole: No   Cervical Dilation: 6        Cervical Effacement: 80  Fetal Station: -1  Baseline Rate: 125 bpm  Fetal Heart Rate: 122 BPM  FHR Category: II               Vital Signs:   Vitals:    09/19/23 1623   BP: 151/88   Pulse: 74   Resp:    Temp:        Notes/comments:   Patient resting comfortably on her back. Starting to have some subtle late decels and feeling more pressure. SVE as above. Reposition to maternal left. Continue pitocin titration.      Ernst Weber MD 9/19/2023 5:01 PM

## 2023-09-19 NOTE — ANESTHESIA PROCEDURE NOTES
Epidural Block    Patient location during procedure: OB  Start time: 9/19/2023 1:36 PM  Reason for block: at surgeon's request and primary anesthetic  Staffing  Performed by: Paige Starks DO  Authorized by: Paige Starks DO    Preanesthetic Checklist  Completed: patient identified, IV checked, risks and benefits discussed, surgical consent, monitors and equipment checked, pre-op evaluation and timeout performed  Epidural  Patient position: sitting  Prep: Betadine  Patient monitoring: frequent blood pressure checks  Approach: midline  Location: lumbar  Injection technique: YANY saline  Needle  Needle type: Tuohy   Needle gauge: 18 G  Catheter type: side hole  Catheter size: 20 G  Catheter at skin depth: 10 cm  Catheter securement method: clear occlusive dressing  Test dose: negative  Assessment  Number of attempts: 2negative aspiration for CSF, negative aspiration for heme and no paresthesia on injection  patient tolerated the procedure well with no immediate complications  Additional Notes  First attempt +heme. Catheter/needle removed and replaced same level without heme.

## 2023-09-19 NOTE — OB LABOR/OXYTOCIN SAFETY PROGRESS
Labor Progress Note - Reyes Rodríguez 43 y.o. female MRN: [id number]    Unit/Bed#: L&D 323-01 Encounter: [id number]       Contraction Frequency (minutes): 4-5  Contraction Quality: Mild  Tachysystole: No   Cervical Dilation: Closed        Cervical Effacement: 70  Fetal Station: -3  Baseline Rate: 125 bpm     FHR Category: 1               Vital Signs:   Vitals:    09/19/23 0322   BP: 115/55   Pulse: 67   Resp:    Temp:        Notes/comments:   PROCEDURE:  HERNANDEZ BALLOON PLACEMENT     A 24F hernandez with a 30cc balloon was selected, SVE was performed and cervix was located, hernandez was introduced over sterile gloved hands. Balloon advanced through cervix beyond the internal cervical os. A small amount amount of sterile saline solution was instilled in the balloon to confirm placement. Placement was confirmed to be beyond the internal cervical os. A total of 60cc of sterile saline solution was placed into the balloon. Pt tolerated well. Instructions left with RN to place hernandez to gravity with a 1L bag of IV fluid. Notify MD when hernandez dislodged.           Henry Bill MD 9/19/2023 6:18 AM

## 2023-09-19 NOTE — OB LABOR/OXYTOCIN SAFETY PROGRESS
Oxytocin Safety Progress Check Note - Natali Francisco 43 y.o. female MRN: [id number]    Unit/Bed#: L&D 323-01 Encounter: [id number]       Contraction Frequency (minutes): 4-5  Contraction Quality: Mild  Tachysystole: No   Cervical Dilation: Closed        Cervical Effacement: 70  Fetal Station: -3  Baseline Rate: 125 bpm     FHR Category: I               Vital Signs:   Vitals:    09/19/23 0647   BP: 135/71   Pulse: 75   Resp:    Temp:        Notes/comments:   Patient has been difficult to trace at times. External FHT works well when patient is on her back in bed, but does not trace well with patient on her side. Rowdy monitor attempted but signal lost with patient walking the halls. Patient placed back in bed on external monitoring to ensure category 1 tracing. Patient had period of minimal variability that responded with juice and repositioning. Last Cytotec received at 615 this morning. Feldman balloon still in place. Will start Pitocin at this time and ensure continuous monitoring. Dr. Tamika Chávez aware.          Luciano Mattson MD 9/19/2023 10:54 AM

## 2023-09-19 NOTE — OB LABOR/OXYTOCIN SAFETY PROGRESS
Labor Progress Note - Keith Barclay 43 y.o. female MRN: [id number]    Unit/Bed#: L&D 323-01 Encounter: [id number]       Contraction Frequency (minutes): IRREGULAR  Contraction Quality: Mild  Tachysystole: No   Cervical Dilation: Closed        Cervical Effacement: 0  Fetal Station: -3  Baseline Rate: 125 bpm     FHR Category: 1               Vital Signs:   Vitals:    09/19/23 0222   BP: 134/73   Pulse: 75   Resp:    Temp:        Notes/comments:   Patient comfortable overall. Feeling contractions. SVE unchanged. Vaginal cytotec placed.      Carlos Cintron MD 9/19/2023 2:26 AM

## 2023-09-19 NOTE — OB LABOR/OXYTOCIN SAFETY PROGRESS
Oxytocin Safety Progress Check Note - Britni Lopez 43 y.o. female MRN: [id number]    Unit/Bed#: L&D 323-01 Encounter: [id number]    Dose (lindy-units/min) Oxytocin: 8 lindy-units/min  Contraction Frequency (minutes): 2-4  Contraction Quality: Mild  Tachysystole: No   Cervical Dilation: 4-5        Cervical Effacement: 70  Fetal Station: -2  Baseline Rate: 140 bpm  Fetal Heart Rate: 130 BPM  FHR Category: II               Vital Signs:   Vitals:    09/19/23 1300   BP: 114/58   Pulse: 76   Resp:    Temp:        Notes/comments:   Resting comfortably in bed. Having good pain relief from her epidural.  Currently feeling a few contractions but not feeling most.  SVE as above. FHT with rare variables, otherwise good variability. Continue with pitocin titration. Consider AROM next check. Dr. Bender Mins aware.      Cristina Reed MD 9/19/2023 2:29 PM

## 2023-09-20 LAB
BASE EXCESS BLDCOA CALC-SCNC: -1.2 MMOL/L (ref 3–11)
BASE EXCESS BLDCOV CALC-SCNC: -3.1 MMOL/L (ref 1–9)
ERYTHROCYTE [DISTWIDTH] IN BLOOD BY AUTOMATED COUNT: 14.6 % (ref 11.6–15.1)
HCO3 BLDCOA-SCNC: 24.6 MMOL/L (ref 17.3–27.3)
HCO3 BLDCOV-SCNC: 21 MMOL/L (ref 12.2–28.6)
HCT VFR BLD AUTO: 28.7 % (ref 34.8–46.1)
HGB BLD-MCNC: 9.3 G/DL (ref 11.5–15.4)
MCH RBC QN AUTO: 28.9 PG (ref 26.8–34.3)
MCHC RBC AUTO-ENTMCNC: 32.4 G/DL (ref 31.4–37.4)
MCV RBC AUTO: 89 FL (ref 82–98)
O2 CT VFR BLDCOA CALC: 11 ML/DL
OXYHGB MFR BLDCOA: 47.2 %
OXYHGB MFR BLDCOV: 70.6 %
PCO2 BLDCOA: 45.1 MM[HG] (ref 30–60)
PCO2 BLDCOV: 35.5 MM HG (ref 27–43)
PH BLDCOA: 7.36 [PH] (ref 7.23–7.43)
PH BLDCOV: 7.39 [PH] (ref 7.19–7.49)
PLATELET # BLD AUTO: 200 THOUSANDS/UL (ref 149–390)
PMV BLD AUTO: 9.9 FL (ref 8.9–12.7)
PO2 BLDCOA: 20.1 MM HG (ref 5–25)
PO2 BLDCOV: 28.3 MM HG (ref 15–45)
RBC # BLD AUTO: 3.22 MILLION/UL (ref 3.81–5.12)
SAO2 % BLDCOV: 16.6 ML/DL
WBC # BLD AUTO: 24.24 THOUSAND/UL (ref 4.31–10.16)

## 2023-09-20 PROCEDURE — 59409 OBSTETRICAL CARE: CPT | Performed by: OBSTETRICS & GYNECOLOGY

## 2023-09-20 PROCEDURE — 85027 COMPLETE CBC AUTOMATED: CPT

## 2023-09-20 PROCEDURE — 82805 BLOOD GASES W/O2 SATURATION: CPT

## 2023-09-20 PROCEDURE — 0UQC7ZZ REPAIR CERVIX, VIA NATURAL OR ARTIFICIAL OPENING: ICD-10-PCS | Performed by: OBSTETRICS & GYNECOLOGY

## 2023-09-20 PROCEDURE — NC001 PR NO CHARGE: Performed by: OBSTETRICS & GYNECOLOGY

## 2023-09-20 RX ORDER — ACETAMINOPHEN 325 MG/1
975 TABLET ORAL ONCE
Status: COMPLETED | OUTPATIENT
Start: 2023-09-20 | End: 2023-09-20

## 2023-09-20 RX ORDER — BUTALBITAL, ACETAMINOPHEN AND CAFFEINE 50; 325; 40 MG/1; MG/1; MG/1
1 TABLET ORAL EVERY 6 HOURS PRN
Status: DISCONTINUED | OUTPATIENT
Start: 2023-09-20 | End: 2023-09-22 | Stop reason: HOSPADM

## 2023-09-20 RX ORDER — CALCIUM CARBONATE 500 MG/1
1000 TABLET, CHEWABLE ORAL DAILY PRN
Status: DISCONTINUED | OUTPATIENT
Start: 2023-09-20 | End: 2023-09-20 | Stop reason: SDUPTHER

## 2023-09-20 RX ORDER — LORATADINE 10 MG/1
10 TABLET ORAL DAILY
Status: DISCONTINUED | OUTPATIENT
Start: 2023-09-21 | End: 2023-09-22 | Stop reason: HOSPADM

## 2023-09-20 RX ORDER — OXYTOCIN/RINGER'S LACTATE 30/500 ML
250 PLASTIC BAG, INJECTION (ML) INTRAVENOUS ONCE
Status: DISCONTINUED | OUTPATIENT
Start: 2023-09-20 | End: 2023-09-22 | Stop reason: HOSPADM

## 2023-09-20 RX ORDER — CEFOXITIN SODIUM 2 G/50ML
2000 INJECTION, SOLUTION INTRAVENOUS EVERY 6 HOURS
Status: DISCONTINUED | OUTPATIENT
Start: 2023-09-20 | End: 2023-09-21

## 2023-09-20 RX ORDER — CYCLOBENZAPRINE HCL 10 MG
10 TABLET ORAL 3 TIMES DAILY PRN
Status: DISCONTINUED | OUTPATIENT
Start: 2023-09-20 | End: 2023-09-22 | Stop reason: HOSPADM

## 2023-09-20 RX ORDER — ONDANSETRON 4 MG/1
4 TABLET, ORALLY DISINTEGRATING ORAL EVERY 6 HOURS SCHEDULED
Status: DISCONTINUED | OUTPATIENT
Start: 2023-09-20 | End: 2023-09-22 | Stop reason: HOSPADM

## 2023-09-20 RX ORDER — ACETAMINOPHEN 325 MG/1
650 TABLET ORAL EVERY 4 HOURS PRN
Status: DISCONTINUED | OUTPATIENT
Start: 2023-09-20 | End: 2023-09-22 | Stop reason: HOSPADM

## 2023-09-20 RX ORDER — ENOXAPARIN SODIUM 100 MG/ML
40 INJECTION SUBCUTANEOUS DAILY
Status: DISCONTINUED | OUTPATIENT
Start: 2023-09-20 | End: 2023-09-21

## 2023-09-20 RX ORDER — ENOXAPARIN SODIUM 100 MG/ML
40 INJECTION SUBCUTANEOUS EVERY 12 HOURS SCHEDULED
Status: DISCONTINUED | OUTPATIENT
Start: 2023-09-21 | End: 2023-09-20

## 2023-09-20 RX ORDER — CALCIUM CARBONATE 500 MG/1
1000 TABLET, CHEWABLE ORAL 3 TIMES DAILY PRN
Status: DISCONTINUED | OUTPATIENT
Start: 2023-09-20 | End: 2023-09-22 | Stop reason: HOSPADM

## 2023-09-20 RX ORDER — METOCLOPRAMIDE HYDROCHLORIDE 5 MG/ML
10 INJECTION INTRAMUSCULAR; INTRAVENOUS EVERY 6 HOURS PRN
Status: DISCONTINUED | OUTPATIENT
Start: 2023-09-20 | End: 2023-09-22 | Stop reason: HOSPADM

## 2023-09-20 RX ORDER — CLOTRIMAZOLE AND BETAMETHASONE DIPROPIONATE 10; .64 MG/G; MG/G
CREAM TOPICAL 2 TIMES DAILY
Status: DISCONTINUED | OUTPATIENT
Start: 2023-09-20 | End: 2023-09-22 | Stop reason: HOSPADM

## 2023-09-20 RX ORDER — ONDANSETRON 2 MG/ML
4 INJECTION INTRAMUSCULAR; INTRAVENOUS EVERY 8 HOURS PRN
Status: DISCONTINUED | OUTPATIENT
Start: 2023-09-20 | End: 2023-09-20 | Stop reason: SDUPTHER

## 2023-09-20 RX ORDER — ALBUTEROL SULFATE 90 UG/1
2 AEROSOL, METERED RESPIRATORY (INHALATION) EVERY 4 HOURS PRN
Status: DISCONTINUED | OUTPATIENT
Start: 2023-09-20 | End: 2023-09-20 | Stop reason: SDUPTHER

## 2023-09-20 RX ORDER — PYRIDOXINE HCL (VITAMIN B6) 50 MG
250 TABLET ORAL DAILY
Status: DISCONTINUED | OUTPATIENT
Start: 2023-09-20 | End: 2023-09-22 | Stop reason: HOSPADM

## 2023-09-20 RX ORDER — DIPHENHYDRAMINE HCL 25 MG
25 TABLET ORAL EVERY 6 HOURS PRN
Status: DISCONTINUED | OUTPATIENT
Start: 2023-09-20 | End: 2023-09-22 | Stop reason: HOSPADM

## 2023-09-20 RX ORDER — FLUTICASONE PROPIONATE 50 MCG
1 SPRAY, SUSPENSION (ML) NASAL DAILY
Status: DISCONTINUED | OUTPATIENT
Start: 2023-09-21 | End: 2023-09-22 | Stop reason: HOSPADM

## 2023-09-20 RX ORDER — OXYTOCIN/RINGER'S LACTATE 30/500 ML
62.5 PLASTIC BAG, INJECTION (ML) INTRAVENOUS CONTINUOUS
Status: DISCONTINUED | OUTPATIENT
Start: 2023-09-20 | End: 2023-09-22 | Stop reason: HOSPADM

## 2023-09-20 RX ORDER — CEPHALEXIN 500 MG/1
500 CAPSULE ORAL EVERY 24 HOURS
Status: DISCONTINUED | OUTPATIENT
Start: 2023-09-20 | End: 2023-09-22 | Stop reason: HOSPADM

## 2023-09-20 RX ORDER — METRONIDAZOLE 500 MG/1
500 TABLET ORAL EVERY 12 HOURS SCHEDULED
Status: COMPLETED | OUTPATIENT
Start: 2023-09-20 | End: 2023-09-22

## 2023-09-20 RX ORDER — CARBOPROST TROMETHAMINE 250 UG/ML
INJECTION, SOLUTION INTRAMUSCULAR
Status: DISPENSED
Start: 2023-09-20 | End: 2023-09-21

## 2023-09-20 RX ORDER — MONTELUKAST SODIUM 10 MG/1
10 TABLET ORAL DAILY
Status: DISCONTINUED | OUTPATIENT
Start: 2023-09-21 | End: 2023-09-22 | Stop reason: HOSPADM

## 2023-09-20 RX ORDER — METHYLERGONOVINE MALEATE 0.2 MG/ML
INJECTION INTRAVENOUS
Status: DISPENSED
Start: 2023-09-20 | End: 2023-09-21

## 2023-09-20 RX ORDER — IBUPROFEN 600 MG/1
600 TABLET ORAL EVERY 6 HOURS
Status: DISCONTINUED | OUTPATIENT
Start: 2023-09-20 | End: 2023-09-21

## 2023-09-20 RX ORDER — MISOPROSTOL 200 UG/1
1000 TABLET ORAL ONCE
Status: COMPLETED | OUTPATIENT
Start: 2023-09-20 | End: 2023-09-20

## 2023-09-20 RX ORDER — METOCLOPRAMIDE HYDROCHLORIDE 5 MG/ML
10 INJECTION INTRAMUSCULAR; INTRAVENOUS EVERY 6 HOURS PRN
Status: DISCONTINUED | OUTPATIENT
Start: 2023-09-20 | End: 2023-09-20

## 2023-09-20 RX ADMIN — ROPIVACAINE HYDROCHLORIDE: 2 INJECTION, SOLUTION EPIDURAL; INFILTRATION at 11:54

## 2023-09-20 RX ADMIN — ENOXAPARIN SODIUM 40 MG: 40 INJECTION SUBCUTANEOUS at 19:29

## 2023-09-20 RX ADMIN — BENZOCAINE AND LEVOMENTHOL 1 APPLICATION: 200; 5 SPRAY TOPICAL at 21:24

## 2023-09-20 RX ADMIN — LABETALOL HYDROCHLORIDE 200 MG: 200 TABLET, FILM COATED ORAL at 08:14

## 2023-09-20 RX ADMIN — CEFOXITIN SODIUM 2000 MG: 2 INJECTION, SOLUTION INTRAVENOUS at 19:29

## 2023-09-20 RX ADMIN — METRONIDAZOLE 500 MG: 500 TABLET ORAL at 20:40

## 2023-09-20 RX ADMIN — SODIUM CHLORIDE, SODIUM LACTATE, POTASSIUM CHLORIDE, AND CALCIUM CHLORIDE 125 ML/HR: .6; .31; .03; .02 INJECTION, SOLUTION INTRAVENOUS at 09:31

## 2023-09-20 RX ADMIN — ACETAMINOPHEN 325MG 975 MG: 325 TABLET ORAL at 00:40

## 2023-09-20 RX ADMIN — ACETAMINOPHEN 325MG 650 MG: 325 TABLET ORAL at 21:32

## 2023-09-20 RX ADMIN — SODIUM CHLORIDE 2.5 MILLION UNITS: 9 INJECTION, SOLUTION INTRAVENOUS at 02:38

## 2023-09-20 RX ADMIN — LABETALOL HYDROCHLORIDE 200 MG: 200 TABLET, FILM COATED ORAL at 19:27

## 2023-09-20 RX ADMIN — CALCIUM CARBONATE (ANTACID) CHEW TAB 500 MG 1000 MG: 500 CHEW TAB at 02:35

## 2023-09-20 RX ADMIN — ACETAMINOPHEN 325MG 975 MG: 325 TABLET ORAL at 07:33

## 2023-09-20 RX ADMIN — ROPIVACAINE HYDROCHLORIDE: 2 INJECTION, SOLUTION EPIDURAL; INFILTRATION at 04:34

## 2023-09-20 RX ADMIN — IBUPROFEN 600 MG: 600 TABLET ORAL at 19:28

## 2023-09-20 RX ADMIN — Medication 62.5 MILLI-UNITS/MIN: at 17:28

## 2023-09-20 RX ADMIN — ROPIVACAINE HYDROCHLORIDE 5 ML: 2 INJECTION, SOLUTION EPIDURAL; INFILTRATION; PERINEURAL at 06:57

## 2023-09-20 RX ADMIN — Medication 999 MILLI-UNITS/MIN: at 16:56

## 2023-09-20 RX ADMIN — SODIUM CHLORIDE 2.5 MILLION UNITS: 9 INJECTION, SOLUTION INTRAVENOUS at 16:09

## 2023-09-20 RX ADMIN — SODIUM CHLORIDE 2.5 MILLION UNITS: 9 INJECTION, SOLUTION INTRAVENOUS at 07:02

## 2023-09-20 RX ADMIN — SODIUM CHLORIDE 2.5 MILLION UNITS: 9 INJECTION, SOLUTION INTRAVENOUS at 11:40

## 2023-09-20 RX ADMIN — ROPIVACAINE HYDROCHLORIDE 5 ML: 2 INJECTION, SOLUTION EPIDURAL; INFILTRATION; PERINEURAL at 06:55

## 2023-09-20 RX ADMIN — MISOPROSTOL 1000 MCG: 200 TABLET ORAL at 17:00

## 2023-09-20 RX ADMIN — METOCLOPRAMIDE 10 MG: 5 INJECTION, SOLUTION INTRAMUSCULAR; INTRAVENOUS at 11:48

## 2023-09-20 NOTE — OB LABOR/OXYTOCIN SAFETY PROGRESS
Oxytocin Safety Progress Check Note - Lucero King 43 y.o. female MRN: [id number]    Unit/Bed#: L&D 323-01 Encounter: [id number]    Dose (lindy-units/min) Oxytocin: 20 lindy-units/min  Contraction Frequency (minutes): 2.5-3  Contraction Quality: Moderate  Tachysystole: No   Cervical Dilation: 7-8        Cervical Effacement: 90  Fetal Station: -1  Baseline Rate: 140 bpm  Fetal Heart Rate: 122 BPM  FHR Category: 1               Vital Signs:  Vitals:    09/19/23 2337   BP: 105/56   Pulse: 87   Resp:    Temp:        Notes/comments:   Patient more comfortable with epidural.  Cervical change noted including fetal descent.       Raul Lal MD 9/20/2023 12:17 AM

## 2023-09-20 NOTE — OB LABOR/OXYTOCIN SAFETY PROGRESS
Oxytocin Safety Progress Check Note - Desirae Vazquez 43 y.o. female MRN: [id number]    Unit/Bed#: L&D 323-01 Encounter: [id number]    Dose (lindy-units/min) Oxytocin: 14 lindy-units/min  Contraction Frequency (minutes): 2-4  Contraction Quality: Moderate  Tachysystole: No   Cervical Dilation: 6-7        Cervical Effacement: 80  Fetal Station: -1  Baseline Rate: 135 bpm  Fetal Heart Rate: 122 BPM  FHR Category: 1               Vital Signs:   Vitals:    09/19/23 2008   BP: 140/62   Pulse: 78   Resp:    Temp:        Notes/comments:   Patient feeling more pressure. SVE unchanged. Continue pitocin titration.  Will reassess as needed     Shaina Chirinos MD 9/19/2023 9:00 PM

## 2023-09-20 NOTE — OB LABOR/OXYTOCIN SAFETY PROGRESS
Oxytocin Safety Progress Check Note - Jose Roberto Singer 43 y.o. female MRN: [id number]    Unit/Bed#: L&D 323-01 Encounter: [id number]    Dose (lindy-units/min) Oxytocin: 20 lindy-units/min  Contraction Frequency (minutes): 2-3  Contraction Quality: Moderate  Tachysystole: No   Cervical Dilation: Lip/rim (Comment)        Cervical Effacement: 90  Fetal Station: 2  Baseline Rate: 135 bpm  Fetal Heart Rate: 122 BPM  FHR Category: I               Vital Signs:   Vitals:    09/20/23 1500   BP: 133/68   Pulse:    Resp:    Temp:        Notes/comments:   Patient resting comfortably in bed with peanut ball. On SVE, anterior lip remains but is less than last check. We will continue with peanut ball.  Dr. Nohemi Orourke aware    Nelly Rivas MD 9/20/2023 3:08 PM

## 2023-09-20 NOTE — L&D DELIVERY NOTE
Delivery Summary - OB/GYN   Keith Barclay 43 y.o. female MRN: [id number]  Unit/Bed#: L&D 323-01 Encounter: [id number]    Pre-delivery Diagnosis:   1. 37w3d pregnancy  2. cHTN  3. History of PE  4. Asthma    Post-delivery Diagnosis: same, delivered    Attending: Abdulaziz Fisher MD    Assistant(s): Adams Bernardo MD    Procedure: , repair of cervical laceration    Anesthesia: epidural    Estimated Blood Loss:  QBL 1500    Specimens:   1. Arterial and venous cord gases  2. Cord blood  3. Segment of umbilical cord  4. Placenta to storage   5. Postpartum hemorrhage    Complications:  None apparent    Findings:  1. Viable female  delivered at 1645 weighing 7lbs 4oz;  Apgar scores of 9 at one minute and 9 at five minutes. 2. Spontaneous delivery of placenta with paracentrally inserted 3-vessel cord  3. Cervical laceration on posterior lip requiring repair  4. 1st degree perineal laceration, repaired with 3-0 Vicryl rapide  5. Umbilical Cord Gases     pH Base Excess   Arterial Results from last 7 days   Lab Units 23  1646   PH COA  7.355     Results from last 7 days   Lab Units 23  1646   BASE EXC COA mmol/L -1.2*      Venous Results from last 7 days   Lab Units 23  1646   PH COV  7.390     Results from last 7 days   Lab Units 23  1646   BASE EXC COV mmol/L -3.1*             Disposition: Patient tolerated the procedure well and was recovering in labor and delivery room with family and  before being transferred to the post-partum floor. Procedure Details     Description of procedure    After pushing for 20 minutes, at [address] patient delivered a viable female , weighing 7lbs 4 oz, Apgars of 9 (1 min) and 9 (5 min). The fetal vertex delivered spontaneously from the PATRICE position. There was no nuchal cord. The anterior shoulder (left) delivered atraumatically with maternal expulsive forces and the assistance of downward traction.  The posterior shoulder delivered with maternal expulsive forces and the assistance of upward traction. The remainder of the fetus delivered spontaneously. Upon delivery, the infant was placed on the mothers abdomen and the cord was clamped and cut. The infant was noted to cry spontaneously and was moving all extremities appropriately. There was no evidence for injury. Awaiting nurse resuscitators evaluated the  at bedside. Arterial and venous cord blood gases and cord blood was collected for analysis. These were promptly sent to the lab. In the immediate post-partum, 30 units of IV pitocin was administered and the uterus was noted to contract down well with massage and pitocin. The placenta delivered spontaneously at 1650 and was noted to have a paracentrally inserted 3 vessel cord. A post partum hemorrhage was identified. Secondary to maternal asthma, chronic hypertension, and history of PE multiple medications were contraindicated. We decided to use Cytotec 1 mg rectally to help with the postpartum hemorrhage. The Lashaun device was then brought to the field. The device was inserted into the vagina and advanced to the fundus of the uterus. The balloon was then inflated with sterile saline. When the wound was checked it was found to be outside the cervix and protruding to the introitus. The balloon was then desufflated. The speculum was then brought to the field to evaluate the cervix. Using ring forcep, the cervix was grasped circumferentially. There was noted to be a posterior cervical laceration. Help was brought into the room for repair. A 3-0 Vicryl was used in a running locked fashion. Hemostasis was noted. The Светлана Canary was abandoned at this time. The vagina, cervix, and perineum were inspected and there was noted to be a first degree. The first degree with then repaired with a 3-0 vicryl as well.  ood hemostasis was confirmed at the conclusion of this procedure.     At the conclusion of the delivery, all needle, sponge, and instrument counts were noted to be correct. Patient tolerated the procedure well and was allowed to recover in labor and delivery room with family and  before being transferred to the post-partum floor.

## 2023-09-20 NOTE — OB LABOR/OXYTOCIN SAFETY PROGRESS
Oxytocin Safety Progress Check Note - Keith Barclay 43 y.o. female MRN: [id number]    Unit/Bed#: L&D 323-01 Encounter: [id number]    Dose (lindy-units/min) Oxytocin: 16 lindy-units/min  Contraction Frequency (minutes): 2-4  Contraction Quality: Moderate  Tachysystole: No   Cervical Dilation: 7        Cervical Effacement: 80  Fetal Station: -2  Baseline Rate: 140 bpm  Fetal Heart Rate: 122 BPM  FHR Category: 1               Vital Signs:   Vitals:    09/19/23 2145   BP: 147/79   Pulse:    Resp:    Temp: 99.3 °F (37.4 °C)       Notes/comments:   IUPC inserted due to minimal cervical change and difficulty tracing contractions. FSE applied due to difficulty with continuous fetal heart rate monitoring when changing positions.         Tete Beckwith MD 9/19/2023 10:17 PM

## 2023-09-20 NOTE — UTILIZATION REVIEW
NOTIFICATION OF INPATIENT ADMISSION   MATERNITY/DELIVERY AUTHORIZATION REQUEST   SERVICING FACILITY:   [address] [place] [address] - L&D, [place], NICU  [address]  Tax ID: [id number]  NPI: [id number] ATTENDING PROVIDER:  Attending Name and NPI#: Lloyd Quintana Md [[id number]]  Address: [address]  Phone: [phone]     ADMISSION INFORMATION:  Place of Service: Inpatient [address]  Place of Service Code: 21  Inpatient Admission Date/Time: 23  8:49 PM  Discharge Date/Time: No discharge date for patient encounter. Admitting Diagnosis Code/Description:  Encounter for full-term uncomplicated delivery [D09]     Mother: Lashae Barahona 1985 Estimated Date of Delivery: 10/8/23  Delivering clinician: This patient has no babies on file. OB History        1    Para   0    Term   0       0    AB   0    Living   0       SAB   0    IAB   0    Ectopic   0    Multiple   0    Live Births   0               [place] Name & MRN:   This patient has no babies on file.  Birth Information: 43 y.o. female MRN: [id number] Unit/Bed#: L&D 323-01   Birthweight: No birth weight on file. Gestational Age: <None> Delivery Type:    APGARS Totals:        UTILIZATION REVIEW CONTACT:  Carmen Chambers Utilization   Network Utilization Review Department  Phone: [phone]  Fax [phone]  Email: [email or web address]  Contact for approvals/pending authorizations, clinical reviews, and discharge. PHYSICIAN ADVISORY SERVICES:  Medical Necessity Denial & [employer] Review  Phone: [phone]  Fax: [phone]  Email: [email or web address]

## 2023-09-20 NOTE — UTILIZATION REVIEW
Initial Clinical Review    Admission: Date/Time/Statement:   Admission Orders (From admission, onward)     Ordered        23  Inpatient Admission  Once                      Orders Placed This Encounter   Procedures   • Inpatient Admission     Standing Status:   Standing     Number of Occurrences:   1     Order Specific Question:   Level of Care     Answer:   Med Surg [16]     Order Specific Question:   Estimated length of stay     Answer:   More than 2 Midnights     Order Specific Question:   Certification     Answer:   I certify that inpatient services are medically necessary for this patient for a duration of greater than two midnights. See H&P and MD Progress Notes for additional information about the patient's course of treatment. Chief Complaint   Patient presents with   • Scheduled Induction       Initial Presentation: 43 y.o.  @ 37+1 weeks gestation being ADMITTED INPATIENT for IOL secondary to [facility] on medications. Pregnancy is also complicated by GBS positive status, AMA, h.o PE on lovenox, and asthma. SVE: 0/0/-3. FHT: reactive. Clinical EFW: 80% ; Cephalic confirmed by TAUS. GBS status: pos, PCN ordered. Pt d/cd lovenox on . Epidural prn. Clear liquid diet, IVF LR 125cc/hr. Continuous fetal monitoring and tocometry. Analgesia at maternal request. Vertex by TAUS. Induction plan: cytotec, hernandez, pit. Lovenox sq. Albuterol prn. Monitor BP.     2:26 AM --    Contraction Frequency (minutes): IRREGULAR  Contraction Quality: Mild  Tachysystole: No   Cervical Dilation: Closed  Cervical Effacement: 0  Fetal Station: -3  Baseline Rate: 125 bpm  FHR Category: 1  Patient comfortable overall. Feeling contractions. SVE unchanged. Vaginal cytotec placed.      6:18 AM -- hernandez cath placed     11:22 AM -- Dose (lindy-units/min) Oxytocin: 2 lindy-units/min  Contraction Frequency (minutes): difficulty tracing  Contraction Quality: Mild  Tachysystole: No   Cervical Dilation: 3  Cervical Effacement: 79  Fetal Station: -3  Baseline Rate: 135 bpm  Better FHT tracing with external monitoring with patient in bed. Has been category 1. Pitocin was just started, plan to continue titration. Dr. Marek Bush aware    1:48 PM -- epidural block    2:29 PM  Dose (lindy-units/min) Oxytocin: 8 lindy-units/min  Contraction Frequency (minutes): 2-4  Contraction Quality: Mild  Tachysystole: No   Cervical Dilation: 4-5  Cervical Effacement: 70  Fetal Station: -2  Baseline Rate: 140 bpm  Fetal Heart Rate: 130 BPM  Having good pain relief from her epidural.  Currently feeling a few contractions but not feeling most.  SVE as above. FHT with rare variables, otherwise good variability. Continue with pitocin titration. 5:01 PM  Dose (lindy-units/min) Oxytocin: 12 lindy-units/min  Contraction Frequency (minutes): 2-3  Contraction Quality: Moderate  Tachysystole: No   Cervical Dilation: 6  Cervical Effacement: 80  Fetal Station: -1  Baseline Rate: 125 bpm  Fetal Heart Rate: 122 BPM  Starting to have some subtle late decels and feeling more pressure. SVE as above. Reposition to maternal left. Continue pitocin titration    10:17 PM  Dose (lindy-units/min) Oxytocin: 16 lindy-units/min  Contraction Frequency (minutes): 2-4  Contraction Quality: Moderate  Tachysystole: No   Cervical Dilation: 7  Cervical Effacement: 80  Fetal Station: -2  Baseline Rate: 140 bpm  Fetal Heart Rate: 122 BPM  IUPC inserted due to minimal cervical change and difficulty tracing contractions. FSE applied due to difficulty with continuous fetal heart rate monitoring when changing positions.     9/20 2:28 AM  Dose (lindy-units/min) Oxytocin: 20 lindy-units/min  Contraction Frequency (minutes): 2.5-3.5  Contraction Quality: Moderate  Tachysystole: No   Cervical Dilation: 7-8  Cervical Effacement: 90  Fetal Station: -1  Baseline Rate: 135 bpm  Fetal Heart Rate: 122 BPM  FHT showing intermittent late decelerations. SVE unchanged.  Will turn pitocin off 2/2 category 2 tracing. Plan to restart in 1 hour if category     9:32 AM  Dose (lindy-units/min) Oxytocin: 18 lindy-units/min  Contraction Frequency (minutes): 3-3.5  Contraction Quality: Moderate  Tachysystole: No   Cervical Dilation: 8  Cervical Effacement: 90  Fetal Station: 1  Baseline Rate: 135 bpm  Fetal Heart Rate: 122 BPM  Discussed cervical change, will continue expectant management. discussed possible need for . 3:08 PM  Dose (lindy-units/min) Oxytocin: 20 lindy-units/min  Contraction Frequency (minutes): 2-3  Contraction Quality: Moderate  Tachysystole: No   Cervical Dilation: Lip/rim (Comment)  Cervical Effacement: 90  Fetal Station: 2  Baseline Rate: 135 bpm  Fetal Heart Rate: 122 BPM  Patient resting comfortably in bed with peanut ball. On SVE, anterior lip remains but is less than last check.         Wt Readings from Last 1 Encounters:   23 119 kg (262 lb)     Vital Signs:   Date/Time Temp Pulse Resp BP Patient Position - Orthostatic VS   23 1523 -- 85 -- 139/71 --   23 1500 -- -- -- 133/68 --   23 1252 -- 93 -- 178/80 Abnormal   --   23 1237 -- 93 -- 159/71 --   23 1224 -- 83 -- 172/83 Abnormal   --   23 1134 98.9 °F (37.2 °C) -- -- -- --   23 0900 -- -- -- 114/53 --   23 0822 -- 110 Abnormal  -- 119/57 --   23 0816 98 °F (36.7 °C) -- -- -- --   23 0715 99.5 °F (37.5 °C) -- -- -- --   23 0152 100 °F (37.8 °C) 91 -- 116/55 --   23 0122 -- 108 Abnormal  -- 127/63 --   23 0041 100.1 °F (37.8 °C) -- -- -- --   23 2352 -- 84 -- 111/53 --   23 2337 99.6 °F (37.6 °C) 87 -- 105/56 --   23 2008 -- 78 -- 140/62 --   23 1600 -- -- -- 137/82 --   23 1553 99 °F (37.2 °C) 80 -- 137/82 --   23 1200 -- 74 -- 134/73 --   23 0632 -- 76 -- 136/72 --   23 0032 98.3 °F (36.8 °C) 77 -- 121/67 --   23 2038 98.4 °F (36.9 °C) 75 18 143/86 Sitting       Pertinent Labs/Diagnostic Test Results:   Results from last 7 days   Lab Units 09/18/23  2114 09/15/23  1613   WBC Thousand/uL 14.07* 13.44*   HEMOGLOBIN g/dL 11.8 12.1   HEMATOCRIT % 35.5 37.0   PLATELETS Thousands/uL 250 250     Results from last 7 days   Lab Units 09/18/23  2114 09/15/23  1613   SODIUM mmol/L 135 138   POTASSIUM mmol/L 3.5 3.5   CHLORIDE mmol/L 107 106   CO2 mmol/L 21 22   ANION GAP mmol/L 7 10   BUN mg/dL 10 8   CREATININE mg/dL 0.75 0.82   EGFR ml/min/1.73sq m 100 90   CALCIUM mg/dL 9.8 9.7     Results from last 7 days   Lab Units 09/18/23  2114 09/15/23  1613   AST U/L 15 16   ALT U/L 17 17   ALK PHOS U/L 60 64   TOTAL PROTEIN g/dL 6.6 6.4   ALBUMIN g/dL 3.5 3.4*   TOTAL BILIRUBIN mg/dL 0.23 0.34       Results from last 7 days   Lab Units 09/18/23  2114 09/15/23  1613   GLUCOSE RANDOM mg/dL 85 76     Results from last 7 days   Lab Units 09/18/23  2221 09/15/23  1615 09/15/23  1522   CLARITY UA   --   --  Clear   COLOR UA   --   --  Light Yellow   SPEC GRAV UA   --   --  1.007   PH UA   --   --  6.5   GLUCOSE UA mg/dl  --   --  Negative   KETONES UA mg/dl  --   --  Negative   BLOOD UA   --   --  Negative   PROTEIN UA mg/dl  --   --  Negative   NITRITE UA   --   --  Negative   BILIRUBIN UA   --   --  Negative   UROBILINOGEN UA (BE) mg/dl  --   --  <2.0   LEUKOCYTES UA   --   --  Negative   WBC UA /hpf  --   --  1-2   RBC UA /hpf  --   --  None Seen   BACTERIA UA /hpf  --   --  Occasional   EPITHELIAL CELLS WET PREP /hpf  --   --  Occasional   CREATININE UR mg/dL 36.5 35.3  --    PROTEIN UR mg/dL 5 5  --    PROT/CREAT RATIO UR  0.14* 0.14*  --      Results from last 7 days   Lab Units 09/15/23  1522   URINE CULTURE  No Growth <1000 cfu/mL       Past Medical History:   Diagnosis Date   • Asthma    • Depression    • GERD (gastroesophageal reflux disease)    • Hypertension    • IC (interstitial cystitis)    • Migraines    • PCOS (polycystic ovarian syndrome)    • Pulmonary embolism ([address]) 2009   • Venous insufficiency Present on Admission:  • Chronic hypertension affecting pregnancy  • Group B streptococcal bacteriuria  • Mild intermittent asthma without complication  • History of pulmonary embolism      Admitting Diagnosis: Encounter for full-term uncomplicated delivery [U55]  Age/Sex: 43 y.o. female  Admission Orders:  Scheduled Medications:  labetalol, 200 mg, Oral, BID  penicillin G, 2.5 Million Units, Intravenous, Q4H    Continuous IV Infusions:  lactated ringers, 125 mL/hr, Intravenous, Continuous  oxytocin, 1-30 lindy-units/min, Intravenous, Titrated  ropivacaine 0.2%, , Epidural, Continuous    PRN Meds:  albuterol, 2 puff, Inhalation, Q4H PRN  bupivacaine (PF), 30 mL, Infiltration, Once PRN  calcium carbonate, 1,000 mg, Oral, TID PRN  metoclopramide, 10 mg, Intravenous, Q6H PRN  ondansetron, 4 mg, Intravenous, Q6H PRN        Network Utilization Review Department  ATTENTION: Please call with any questions or concerns to [phone] and carefully listen to the prompts so that you are directed to the right person. All voicemails are confidential.  Bo Salinas all requests for admission clinical reviews, approved or denied determinations and any other requests to dedicated fax number below belonging to the campus where the patient is receiving treatment.  List of dedicated fax numbers for the Facilities:  [place] DENIALS (Administrative/Medical Necessity) [phone]   [address] [facility] (Maternity/NICU/Pediatrics) [phone]   [address] [place] [address] Drive [phone]   [facility] [address] [facility] [phone] [address] [place] [address] 1300 [facility]  [facility] [phone]

## 2023-09-20 NOTE — OB LABOR/OXYTOCIN SAFETY PROGRESS
Oxytocin Safety Progress Check Note - Jessica Wan 43 y.o. female MRN: [id number]    Unit/Bed#: L&D 323-01 Encounter: [id number]    Dose (lindy-units/min) Oxytocin: 20 lindy-units/min  Contraction Frequency (minutes): 2.5-3.5  Contraction Quality: Moderate  Tachysystole: No   Cervical Dilation: 7-8        Cervical Effacement: 90  Fetal Station: -1  Baseline Rate: 135 bpm  Fetal Heart Rate: 122 BPM  FHR Category: 2               Vital Signs:   Vitals:    09/20/23 0122   BP: 127/63   Pulse: (!) 108   Resp:    Temp:        Notes/comments:   Patient comfortable. FHT showing intermittent late decelerations. SVE unchanged. Will turn pitocin off 2/2 category 2 tracing. Plan to restart in 1 hour if category 1.    D/w Dr. Bucky Antoine MD 9/20/2023 2:28 AM

## 2023-09-20 NOTE — OB LABOR/OXYTOCIN SAFETY PROGRESS
Oxytocin Safety Progress Check Note - Noa Vila 43 y.o. female MRN: [id number]    Unit/Bed#: L&D 323-01 Encounter: [id number]    Dose (lindy-units/min) Oxytocin: 14 lindy-units/min  Contraction Frequency (minutes): 3-4  Contraction Quality: Moderate  Tachysystole: No   Cervical Dilation: 8        Cervical Effacement: 90  Fetal Station: 0  Baseline Rate: 135 bpm  Fetal Heart Rate: 122 BPM  FHR Category: 1               Vital Signs:   Vitals:    09/20/23 0708   BP: 119/57   Pulse: 85   Resp:    Temp:        Notes/comments:   Cervical change and fetal descent noted, although minimal.  Continue oxytocin.  Will sign out to Dr. Magdy Hammond MD 9/20/2023 7:15 AM

## 2023-09-20 NOTE — ASSESSMENT & PLAN NOTE
Secondary to Cervical laceration  QBL 1500  Hgb 11.8 -> 9.3 -> 7.5, s/p venofer -> 8.0  Asymptomatic

## 2023-09-20 NOTE — OB LABOR/OXYTOCIN SAFETY PROGRESS
Oxytocin Safety Progress Check Note - Grace Lott 43 y.o. female MRN: [id number]    Unit/Bed#: L&D 323-01 Encounter: [id number]    Dose (lindy-units/min) Oxytocin: 20 lindy-units/min  Contraction Frequency (minutes): 3-4  Contraction Quality: Moderate  Tachysystole: No   Cervical Dilation: Lip/rim (Comment)        Cervical Effacement: 90  Fetal Station: 1  Baseline Rate: 130 bpm  Fetal Heart Rate: 122 BPM  FHR Category:  I               Vital Signs:   Vitals:    09/20/23 1308   BP: 141/67   Pulse: 86   Resp:    Temp:        Notes/comments:        Yaniv Marie MD 9/20/2023 1:30 PM

## 2023-09-20 NOTE — OB LABOR/OXYTOCIN SAFETY PROGRESS
Oxytocin Safety Progress Check Note - Kacie Hay 43 y.o. female MRN: [id number]    Unit/Bed#: L&D 323-01 Encounter: [id number]    Dose (lindy-units/min) Oxytocin: 20 lindy-units/min  Contraction Frequency (minutes): 3-4  Contraction Quality: Moderate  Tachysystole: No   Cervical Dilation: Lip/rim (Comment)        Cervical Effacement: 90  Fetal Station: 1  Baseline Rate: 130 bpm  Fetal Heart Rate: 122 BPM  FHR Category:  I               Vital Signs:   Vitals:    09/20/23 1237   BP: 159/71   Pulse: 93   Resp:    Temp:        Notes/comments:   Patient is feeling pressure, will reposition     Tess Peraza MD 9/20/2023 12:42 PM

## 2023-09-20 NOTE — OB LABOR/OXYTOCIN SAFETY PROGRESS
Oxytocin Safety Progress Check Note - Jose Roberto Singer 43 y.o. female MRN: [id number]    Unit/Bed#: L&D 323-01 Encounter: [id number]    Dose (lindy-units/min) Oxytocin: 18 lindy-units/min  Contraction Frequency (minutes): 3-3.5  Contraction Quality: Moderate  Tachysystole: No   Cervical Dilation: 8        Cervical Effacement: 90  Fetal Station: 1  Baseline Rate: 135 bpm  Fetal Heart Rate: 122 BPM  FHR Category: II               Vital Signs:   Vitals:    23 0928   BP:    Pulse:    Resp:    Temp: 98.4 °F (36.9 °C)       Notes/comments:   Late noted, patient repositioned and re-examined  Discussed cervical change, will continue expectant management. discussed possible need for .    Akin Scales MD 2023 9:32 AM

## 2023-09-20 NOTE — ANESTHESIA POSTPROCEDURE EVALUATION
Post-Op Assessment Note    CV Status:  Stable    Pain management: adequate     Mental Status:  Alert and awake   Hydration Status:  Euvolemic   PONV Controlled:  Controlled   Airway Patency:  Patent      Post Op Vitals Reviewed: Yes      Staff: Anesthesiologist     Post-op block assessment: no complications and catheter intact      No notable events documented.   /93   Pulse (!) 114   Temp 99.9 °F (37.7 °C) (Oral)   Resp 18   Ht 5' 11" (1.803 m)   Wt 119 kg (262 lb)   LMP 01/06/2023   Breastfeeding Unknown   BMI 36.54 kg/m²

## 2023-09-20 NOTE — DISCHARGE SUMMARY
Discharge Summary - Kacie Hay 43 y.o. female MRN: [id number]    Unit/Bed#: L&D 306-01 Encounter: [id number]    ADMISSION  Admission Date: 9/18/2023   Admitting Attending: Dr. Tess Peraza MD  Admitting Diagnoses:   Patient Active Problem List   Diagnosis   • Chronic hypertension affecting pregnancy   • Mild intermittent asthma without complication   • PCOS (polycystic ovarian syndrome)   • History of pulmonary embolism   • Family history of breast cancer   • Mass of lower inner quadrant of left breast   • Migraine   • Supervision of high-risk pregnancy   • 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   • 37 weeks gestation   • Interstitial cystitis   • GERD (gastroesophageal reflux disease)   • Depression affecting pregnancy   • Asthma   • Medication exposure during first trimester of pregnancy   • Group B streptococcal bacteriuria   • Primigravida of advanced maternal age in third trimester   • Excessive weight gain during pregnancy in third trimester   • Impaired glucose tolerance during pregnancy   • Bacterial vaginosis   • Other immediate postpartum hemorrhage       DELIVERY  Delivery Method: Vaginal, Spontaneous    Delivery Date and Time: 9/20/2023  4:45 PM   Delivery Attending: Tess Peraza     DISCHARGE  Discharge Date: 9/22/2023  Discharge Attending: Dr. Tess Peraza MD  Discharge Diagnosis:   Same, Delivered  Post partum hemorrhage   Cervical laceration     Clinical course: Admission to Delivery  Kacie Hay is a 43 y.o. Thornton Daniel who was admitted at 44w1d for induction of labor. Reason for induction: CHTN    Induction method:  , ,Misoprostol; Feldman/[facility]I  Other . For pain control in labor, pt received epidural. She progressed to complete and began pushing. Delivery  Route of Delivery: Vaginal, Spontaneous     Anesthesia: Epidural ,   QBL: Non-Surgical QBL (mL): 1550        Delivery: Vaginal, Spontaneous  at 9/20/2023  4:45 PM   Laceration: Perineal: 1°  Repaired?  Yes , Cervical laceration, repaired    Baby's Weight: 3280 g (7 lb 3.7 oz) ; 115.7     Apgar scores: 9  and 9  at 1 and 5 minutes, respectively      Clinical Course: Post-Delivery:  The post delivery course was unremarkable. She received venofer for a postpartum hemoglobin of 7.5. She remained asymptomatic, and her hemoglobin was stable at 8 prior to discharge    On the day of discharge, the patient was ambulating, voiding spontaneously, tolerating oral intake, and hemodynamically stable. She was able to reasonably perform all ADLs. She had appropriate bowel function. Pain was well-controlled. She was discharged home on postpartum/postop day #2 without complications. Patient was instructed to follow up with her OB as an outpatient and was given appropriate warnings to call her provider with problems or concerns. Pertinent lab findings included:   Blood type A positive  So Rhogam not indicated.      Last three Hgb values:  Lab Results   Component Value Date    HGB 8.0 (L) 2023    HGB 7.5 (L) 2023    HGB 9.3 (L) 2023        Problem-specific follow-up plans included the following:  Problem List        Digestive    GERD (gastroesophageal reflux disease)       Endocrine    PCOS (polycystic ovarian syndrome)    Impaired glucose tolerance during pregnancy    Overview     glucola elevated at 27 weeks - 3 hr GTT normal             Respiratory    Mild intermittent asthma without complication    Overview     Inhalers PRN         Current Assessment & Plan     Albuterol PRN         Asthma    Overview     Inhalers PRN            Cardiovascular and Mediastinum    Chronic hypertension affecting pregnancy    Overview     No meds currently   Was previously on lisinopril and metoprolol - after weight loss no metoprolol   Stopped lisinopril 2023   Baseline labs normal, UPC undetectable   Aspirin recommended   (32 weeks): started labetalol 200 BID  - weekly surveillance 32 weeks          Current Assessment & Plan     CBC, CMP wnl, P:C 2.82  Systolic (37PRW), AUN:275 , Min:117 , EV   Diastolic (98BIQ), XGY:92, Min:57, Max:76  Monitor blood pressures and for symptoms of preeclampsia         Migraine       Genitourinary    Interstitial cystitis    Bacterial vaginosis    Other immediate postpartum hemorrhage    Current Assessment & Plan     Secondary to Cervical laceration  QBL 1500  Hgb 11.8 -> 9.3 -> 7.5, s/p venofer -> 8.0  Asymptomatic             Other    History of pulmonary embolism    Overview     This was provoked in the setting of oral contraceptive use. She had a thorough thrombophilia work-up which can be seen in her lab reports from 2022 which was negative for inherited or acquired thrombophilia. She is recommended to continue Lovenox prophylaxis throughout her pregnancy and 6 weeks postpartum         Current Assessment & Plan     lovenox 40 mg qD for 6w postpartum         Family history of breast cancer    Overview     PGM @ 78         Mass of lower inner quadrant of left breast    Overview      US 3/2023: Previously noted area of probable fat necrosis in the 7 o'clock position of the left breast, 5 cm from the nipple has significantly decreased in size currently measuring 5 x 4 x 7 mm.   6 month f/u scheduled 10/4/23         Supervision of high-risk pregnancy    Overview     ASA recommended  Flu vaccine:  completed  Covid vaccine:  completed X 5  NIPS/ MsAFP- negative  S/p tdap   Consents signed         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    Overview     Patient is a carrier for CF, alpha thalassemia, Yamilex Carroll - had carrier screening with Inderjit Nelson  Partner testing:  pending         * (Principal)  (spontaneous vaginal delivery)    Current Assessment & Plan     Lochia WNL   Recovering well   Appropriate bowel and bladder function   Pain well controlled   Tolerating diet   Ambulating without issues   No lower extremity tenderness  GBS bacteruria, adequately treated   Rh positive    Multiple Manual extractions of clots, s/p Cefoxitin 1g            Depression affecting pregnancy    Overview     Stopped topomax, buspar, prozac with + pregnancy  Behavioral health referral sent         Medication exposure during first trimester of pregnancy    Overview     Stopped topomax with + pregnancy test         Primigravida of advanced maternal age in third trimester    Excessive weight gain during pregnancy in third trimester    Overview     56 lbs as of 34 weeks              Discharge med list:  Contraception: abstinence     Medication List      ASK your doctor about these medications    • acetaminophen 325 mg tablet; Commonly known as: TYLENOL; Take 2 tablets   (650 mg total) by mouth every 6 (six) hours as needed for mild pain or   moderate pain  • acetaZOLAMIDE 125 mg tablet; Commonly known as: DIAMOX  • albuterol 90 mcg/act inhaler; Commonly known as: Ventolin HFA; Inhale 2   puffs every 4 (four) hours as needed for wheezing or shortness of breath  • aspirin-acetaminophen-caffeine 250-250-65 MG per tablet; Commonly known   as: EXCEDRIN MIGRAINE  • B2 100 MG Tabs  • cephalexin 500 mg capsule; Commonly known as: Jarvis Hollingsworth; Take 1 capsule   (500 mg total) by mouth every 24 hours Take as daily suppression therapy   after completion of treatment for pyelonephritis  • clotrimazole-betamethasone 1-0.05 % cream; Commonly known as: LOTRISONE;   Apply topically 2 (two) times a day as needed (itching)  • cyclobenzaprine 10 mg tablet; Commonly known as: FLEXERIL;  Take 1 tablet   (10 mg total) by mouth 3 (three) times a day as needed for muscle spasms  • docusate sodium 250 MG capsule; Commonly known as: COLACE  • enoxaparin 40 mg/0.4 mL; Commonly known as: LOVENOX; Inject 0.4 mL (40   mg total) under the skin in the morning for 30 doses  • fluticasone 50 mcg/act nasal spray; Commonly known as: FLONASE; SPRAY 1   SPRAY INTO EACH NOSTRIL EVERY DAY  • labetalol 200 mg tablet; Commonly known as: NORMODYNE; Take 1 tablet   (200 mg total) by mouth 2 (two) times a day  • lidocaine 4 % cream; Commonly known as: LMX; Apply topically as needed   for mild pain  • loratadine 10 mg tablet; Commonly known as: CLARITIN  • MAGNESIUM CITRATE PO  • metoclopramide 10 mg tablet; Commonly known as: Reglan; Take 1 tablet   (10 mg total) by mouth every 6 (six) hours as needed (headache, nausea)  • * metroNIDAZOLE 500 mg tablet; Commonly known as: FLAGYL; Take 1 tablet   (500 mg total) by mouth every 12 (twelve) hours for 7 days; Ask about:   Should I take this medication? • * metroNIDAZOLE 500 mg tablet; Commonly known as: FLAGYL; Take 1 tablet   (500 mg total) by mouth every 12 (twelve) hours for 7 days  • montelukast 10 mg tablet; Commonly known as: SINGULAIR  • * ondansetron 4 mg tablet; Commonly known as: ZOFRAN; Take 1 tablet (4   mg total) by mouth every 8 (eight) hours as needed for nausea or vomiting  • * ondansetron 4 mg tablet; Commonly known as: ZOFRAN; Take 1 tablet (4   mg total) by mouth every 8 (eight) hours as needed for nausea or vomiting  • potassium chloride 20 mEq tablet; Commonly known as: K-DUR,KLOR-CON; Take 1 tablet (20 mEq total) by mouth 2 (two) times a day for 2 days  • UNISOM PO  • VITAMIN B6 PO  * This list has 4 medication(s) that are the same as other medications   prescribed for you. Read the directions carefully, and ask your doctor or   other care provider to review them with you.        Condition at discharge:   good     Disposition:   Home    Planned Readmission:   No    Oniel Hahn MD  PGY-1 OB/GYN

## 2023-09-20 NOTE — OB LABOR/OXYTOCIN SAFETY PROGRESS
Oxytocin Safety Progress Check Note - Collins Hernandez 43 y.o. female MRN: [id number]    Unit/Bed#: L&D 323-01 Encounter: [id number]    Dose (lindy-units/min) Oxytocin: 8 lindy-units/min  Contraction Frequency (minutes): 4-6  Contraction Quality: Moderate  Tachysystole: No   Cervical Dilation: 7-8        Cervical Effacement: 80  Fetal Station: -1  Baseline Rate: 135 bpm  Fetal Heart Rate: 122 BPM  FHR Category: 1               Vital Signs:  Vitals:    09/20/23 0507   BP: 152/71   Pulse: 93   Resp:    Temp:        Notes/comments:   NO cervical change for the past 5 hours, inadequate contractions. Has been on oxytocin for 4 out of the last 5 hours (discontinued for 1 hour).  Discussed meeting criteria for arrest of dilation in 2 hours (6 hours of oxytocin with inadequate uterine activity) Mvus ranging in the 160s        La Nena Osorio MD 9/20/2023 5:37 AM

## 2023-09-21 LAB
ERYTHROCYTE [DISTWIDTH] IN BLOOD BY AUTOMATED COUNT: 14.9 % (ref 11.6–15.1)
HCT VFR BLD AUTO: 22.7 % (ref 34.8–46.1)
HCT VFR BLD AUTO: 24.5 % (ref 34.8–46.1)
HGB BLD-MCNC: 7.5 G/DL (ref 11.5–15.4)
HGB BLD-MCNC: 8 G/DL (ref 11.5–15.4)
MCH RBC QN AUTO: 29.4 PG (ref 26.8–34.3)
MCHC RBC AUTO-ENTMCNC: 32.7 G/DL (ref 31.4–37.4)
MCV RBC AUTO: 90 FL (ref 82–98)
PLATELET # BLD AUTO: 210 THOUSANDS/UL (ref 149–390)
PMV BLD AUTO: 9.8 FL (ref 8.9–12.7)
RBC # BLD AUTO: 2.72 MILLION/UL (ref 3.81–5.12)
WBC # BLD AUTO: 18.42 THOUSAND/UL (ref 4.31–10.16)

## 2023-09-21 PROCEDURE — 85027 COMPLETE CBC AUTOMATED: CPT

## 2023-09-21 PROCEDURE — 85018 HEMOGLOBIN: CPT

## 2023-09-21 PROCEDURE — 99024 POSTOP FOLLOW-UP VISIT: CPT | Performed by: OBSTETRICS & GYNECOLOGY

## 2023-09-21 PROCEDURE — 85014 HEMATOCRIT: CPT

## 2023-09-21 RX ORDER — KETOROLAC TROMETHAMINE 10 MG/1
10 TABLET, FILM COATED ORAL EVERY 6 HOURS
Status: COMPLETED | OUTPATIENT
Start: 2023-09-21 | End: 2023-09-22

## 2023-09-21 RX ORDER — IBUPROFEN 600 MG/1
600 TABLET ORAL EVERY 6 HOURS PRN
Status: DISCONTINUED | OUTPATIENT
Start: 2023-09-22 | End: 2023-09-22 | Stop reason: HOSPADM

## 2023-09-21 RX ORDER — OXYCODONE HYDROCHLORIDE 5 MG/1
5 TABLET ORAL ONCE
Status: COMPLETED | OUTPATIENT
Start: 2023-09-21 | End: 2023-09-21

## 2023-09-21 RX ORDER — ENOXAPARIN SODIUM 100 MG/ML
40 INJECTION SUBCUTANEOUS
Status: DISCONTINUED | OUTPATIENT
Start: 2023-09-21 | End: 2023-09-22 | Stop reason: HOSPADM

## 2023-09-21 RX ADMIN — CEFOXITIN SODIUM 2000 MG: 2 INJECTION, SOLUTION INTRAVENOUS at 01:06

## 2023-09-21 RX ADMIN — ENOXAPARIN SODIUM 40 MG: 40 INJECTION SUBCUTANEOUS at 09:04

## 2023-09-21 RX ADMIN — KETOROLAC TROMETHAMINE 10 MG: 10 TABLET, FILM COATED ORAL at 19:42

## 2023-09-21 RX ADMIN — IBUPROFEN 600 MG: 600 TABLET ORAL at 07:11

## 2023-09-21 RX ADMIN — MONTELUKAST 10 MG: 10 TABLET, FILM COATED ORAL at 09:03

## 2023-09-21 RX ADMIN — ONDANSETRON 4 MG: 4 TABLET, ORALLY DISINTEGRATING ORAL at 18:14

## 2023-09-21 RX ADMIN — IBUPROFEN 600 MG: 600 TABLET ORAL at 01:04

## 2023-09-21 RX ADMIN — ONDANSETRON 4 MG: 4 TABLET, ORALLY DISINTEGRATING ORAL at 01:04

## 2023-09-21 RX ADMIN — METRONIDAZOLE 500 MG: 500 TABLET ORAL at 21:33

## 2023-09-21 RX ADMIN — ONDANSETRON 4 MG: 4 TABLET, ORALLY DISINTEGRATING ORAL at 06:02

## 2023-09-21 RX ADMIN — METRONIDAZOLE 500 MG: 500 TABLET ORAL at 09:02

## 2023-09-21 RX ADMIN — IRON SUCROSE 200 MG: 20 INJECTION, SOLUTION INTRAVENOUS at 06:10

## 2023-09-21 RX ADMIN — OXYCODONE HYDROCHLORIDE 5 MG: 5 TABLET ORAL at 04:17

## 2023-09-21 RX ADMIN — LORATADINE 10 MG: 10 TABLET ORAL at 09:03

## 2023-09-21 RX ADMIN — CEPHALEXIN 500 MG: 500 CAPSULE ORAL at 18:14

## 2023-09-21 RX ADMIN — LABETALOL HYDROCHLORIDE 200 MG: 200 TABLET, FILM COATED ORAL at 18:14

## 2023-09-21 RX ADMIN — ONDANSETRON 4 MG: 4 TABLET, ORALLY DISINTEGRATING ORAL at 11:38

## 2023-09-21 RX ADMIN — ACETAMINOPHEN 325MG 650 MG: 325 TABLET ORAL at 11:40

## 2023-09-21 RX ADMIN — KETOROLAC TROMETHAMINE 10 MG: 10 TABLET, FILM COATED ORAL at 13:42

## 2023-09-21 RX ADMIN — ACETAMINOPHEN 325MG 650 MG: 325 TABLET ORAL at 18:15

## 2023-09-21 NOTE — PROGRESS NOTES
Progress Note - OB/GYN  Alfonso Linares 43 y.o. female MRN: [id number]  Unit/Bed#: L&D 306-01 Encounter: [id number]    Assessment and [address] Josh is a patient of: [facility] (Dr. Gini Cha). She is PPD# 1 s/p  spontaneous vaginal delivery  Recovering well and is stable       Other immediate postpartum hemorrhage  Assessment & Plan  Secondary to Cervical laceration  QBL 1500  Hgb 11.8 -> 9.3 -> 7.5, s/p venofer    History of pulmonary embolism  Assessment & Plan  lovenox for 6w postpartum to start today pending AM H&H    Mild intermittent asthma without complication  Assessment & Plan  Albuterol PRN    Chronic hypertension affecting pregnancy  Assessment & Plan  CBC, CMP wnl, P:C 3.51  Systolic (03ZDB), GWZ:697 , Min:96 , VJF:683   Diastolic (20ZXT), PNE:47, Min:53, Max:67  Monitor blood pressures and for symptoms of preeclampsia    *  (spontaneous vaginal delivery)  Assessment & Plan  Lochia WNL   Recovering well   Appropriate bowel and bladder function   Pain well controlled   Tolerating diet   Ambulating without issues   No lower extremity tenderness  GBS bacteruria, adequately treated   Rh positive    Multiple Manual extractions of clots, s/p Cefoxitin 1g         Disposition    - Anticipate discharge home on PPD# 2      Subjective/Objective     Chief Complaint: Postpartum State     Subjective:    Alfonso Linares is PPD#1 s/p  spontaneous vaginal delivery. She currently c/o lower vaginal pain. Tylenol, motrin, and galen 5 are effective in controlling pain. Patient is currently voiding. She is ambulating. Patient is currently passing flatus and has had no bowel movement. She is tolerating PO, and denies nausea or vomitting. Patient denies fever, chills, chest pain, shortness of breath, or calf tenderness. Lochia is normal. She is recovering well and is stable.        Vitals:   /55 (BP Location: Right arm)   Pulse 76   Temp (!) 97 °F (36.1 °C) (Temporal)   Resp 18 Ht 5' 11" (1.803 m)   Wt 119 kg (262 lb)   LMP 01/06/2023   Breastfeeding No   BMI 36.54 kg/m²       Intake/Output Summary (Last 24 hours) at 9/21/2023 0654  Last data filed at 9/20/2023 1809  Gross per 24 hour   Intake --   Output 4425 ml   Net -4425 ml       Invasive Devices     Peripheral Intravenous Line  Duration           Peripheral IV 09/18/23 Dorsal (posterior); Right Hand 2 days                Physical Exam:   GEN: Lucero King appears well, alert and oriented x 3, pleasant and cooperative   CARDIO: RRR, no murmurs or rubs  RESP:  CTAB, no wheezes or rales  ABDOMEN: soft, no tenderness, no distention, fundus @ U-3  : no signs of hematoma, pain reproducible with palpation of 1 degree repair  EXTREMITIES: non tender, no erythema, b/l Salo's sign negative      Labs:     Hemoglobin   Date Value Ref Range Status   09/21/2023 7.5 (L) 11.5 - 15.4 g/dL Final   09/20/2023 9.3 (L) 11.5 - 15.4 g/dL Final     WBC   Date Value Ref Range Status   09/20/2023 24.24 (H) 4.31 - 10.16 Thousand/uL Final   09/18/2023 14.07 (H) 4.31 - 10.16 Thousand/uL Final     Platelets   Date Value Ref Range Status   09/20/2023 200 149 - 390 Thousands/uL Final   09/18/2023 250 149 - 390 Thousands/uL Final     Creatinine   Date Value Ref Range Status   09/18/2023 0.75 0.60 - 1.30 mg/dL Final     Comment:     Standardized to IDMS reference method   09/15/2023 0.82 0.60 - 1.30 mg/dL Final     Comment:     Standardized to IDMS reference method     AST   Date Value Ref Range Status   09/18/2023 15 13 - 39 U/L Final   09/15/2023 16 13 - 39 U/L Final     ALT   Date Value Ref Range Status   09/18/2023 17 7 - 52 U/L Final     Comment:     Specimen collection should occur prior to Sulfasalazine administration due to the potential for falsely depressed results. 09/15/2023 17 7 - 52 U/L Final     Comment:     Specimen collection should occur prior to Sulfasalazine administration due to the potential for falsely depressed results. Sukhi Quarles MD  9/21/2023  6:54 AM

## 2023-09-21 NOTE — PLAN OF CARE
Problem: PAIN - ADULT  Goal: Verbalizes/displays adequate comfort level or baseline comfort level  Description: Interventions:  - Encourage patient to monitor pain and request assistance  - Assess pain using appropriate pain scale  - Administer analgesics based on type and severity of pain and evaluate response  - Implement non-pharmacological measures as appropriate and evaluate response  - Consider cultural and social influences on pain and pain management  - Notify physician/advanced practitioner if interventions unsuccessful or patient reports new pain  Outcome: Progressing     Problem: INFECTION - ADULT  Goal: Absence or prevention of progression during hospitalization  Description: INTERVENTIONS:  - Assess and monitor for signs and symptoms of infection  - Monitor lab/diagnostic results  - Monitor all insertion sites, i.e. indwelling lines, tubes, and drains  - Monitor endotracheal if appropriate and nasal secretions for changes in amount and color  - [place] appropriate cooling/warming therapies per order  - Administer medications as ordered  - Instruct and encourage patient and family to use good hand hygiene technique  - Identify and instruct in appropriate isolation precautions for identified infection/condition  Outcome: Progressing  Goal: Absence of fever/infection during neutropenic period  Description: INTERVENTIONS:  - Monitor WBC    Outcome: Progressing     Problem: SAFETY ADULT  Goal: Patient will remain free of falls  Description: INTERVENTIONS:  - Educate patient/family on patient safety including physical limitations  - Instruct patient to call for assistance with activity   - Consult OT/PT to assist with strengthening/mobility   - Keep Call bell within reach  - Keep bed low and locked with side rails adjusted as appropriate  - Keep care items and personal belongings within reach  - Initiate and maintain comfort rounds  - Make Fall Risk Sign visible to staff  - Offer Toileting every  Hours, in advance of need  - Initiate/Maintain alarm  - Obtain necessary fall risk management equipment: - Apply yellow socks and bracelet for high fall risk patients  - Consider moving patient to room near nurses station  Outcome: Progressing  Goal: Maintain or return to baseline ADL function  Description: INTERVENTIONS:  -  Assess patient's ability to carry out ADLs; assess patient's baseline for ADL function and identify physical deficits which impact ability to perform ADLs (bathing, care of mouth/teeth, toileting, grooming, dressing, etc.)  - Assess/evaluate cause of self-care deficits   - Assess range of motion  - Assess patient's mobility; develop plan if impaired  - Assess patient's need for assistive devices and provide as appropriate  - Encourage maximum independence but intervene and supervise when necessary  - Involve family in performance of ADLs  - Assess for home care needs following discharge   - Consider OT consult to assist with ADL evaluation and planning for discharge  - Provide patient education as appropriate  Outcome: Progressing  Goal: Maintains/Returns to pre admission functional level  Description: INTERVENTIONS:  - Perform BMAT or MOVE assessment daily.   - Set and communicate daily mobility goal to care team and patient/family/caregiver. - Collaborate with rehabilitation services on mobility goals if consulted  - Perform Range of Motion  times a day. - Reposition patient every  hours.   - Dangle patient  times a day  - Stand patient  times a day  - Ambulate patient times a day  - Out of bed to chair  times a day   - Out of bed for meals  times a day  - Out of bed for toileting  - Record patient progress and toleration of activity level   Outcome: Progressing     Problem: Knowledge Deficit  Goal: Patient/family/caregiver demonstrates understanding of disease process, treatment plan, medications, and discharge instructions  Description: Complete learning assessment and assess knowledge base.  Interventions:  - Provide teaching at level of understanding  - Provide teaching via preferred learning methods  Outcome: Progressing     Problem: DISCHARGE PLANNING  Goal: Discharge to home or other facility with appropriate resources  Description: INTERVENTIONS:  - Identify barriers to discharge w/patient and caregiver  - Arrange for needed discharge resources and transportation as appropriate  - Identify discharge learning needs (meds, wound care, etc.)  - Arrange for interpretive services to assist at discharge as needed  - Refer to Case Management Department for coordinating discharge planning if the patient needs post-hospital services based on physician/advanced practitioner order or complex needs related to functional status, cognitive ability, or social support system  Outcome: Progressing

## 2023-09-22 VITALS
WEIGHT: 262 LBS | SYSTOLIC BLOOD PRESSURE: 112 MMHG | OXYGEN SATURATION: 99 % | HEART RATE: 75 BPM | BODY MASS INDEX: 36.68 KG/M2 | DIASTOLIC BLOOD PRESSURE: 55 MMHG | HEIGHT: 71 IN | RESPIRATION RATE: 16 BRPM | TEMPERATURE: 97.6 F

## 2023-09-22 PROBLEM — R82.71 GROUP B STREPTOCOCCAL BACTERIURIA: Status: RESOLVED | Noted: 2023-04-17 | Resolved: 2023-09-22

## 2023-09-22 PROCEDURE — 99024 POSTOP FOLLOW-UP VISIT: CPT | Performed by: OBSTETRICS & GYNECOLOGY

## 2023-09-22 RX ORDER — FERROUS SULFATE TAB EC 324 MG (65 MG FE EQUIVALENT) 324 (65 FE) MG
324 TABLET DELAYED RESPONSE ORAL
Qty: 180 TABLET | Refills: 3 | Status: SHIPPED | OUTPATIENT
Start: 2023-09-22

## 2023-09-22 RX ORDER — DIPHENHYDRAMINE HYDROCHLORIDE, ZINC ACETATE 2; .1 G/100G; G/100G
CREAM TOPICAL 3 TIMES DAILY PRN
Status: DISCONTINUED | OUTPATIENT
Start: 2023-09-22 | End: 2023-09-22 | Stop reason: HOSPADM

## 2023-09-22 RX ORDER — IBUPROFEN 200 MG
600 TABLET ORAL EVERY 6 HOURS PRN
Start: 2023-09-22

## 2023-09-22 RX ADMIN — ACETAMINOPHEN 325MG 650 MG: 325 TABLET ORAL at 04:32

## 2023-09-22 RX ADMIN — KETOROLAC TROMETHAMINE 10 MG: 10 TABLET, FILM COATED ORAL at 01:38

## 2023-09-22 RX ADMIN — ENOXAPARIN SODIUM 40 MG: 40 INJECTION SUBCUTANEOUS at 08:05

## 2023-09-22 RX ADMIN — DIPHENHYDRAMINE HYDROCHLORIDE, ZINC ACETATE: 2; .1 CREAM TOPICAL at 08:04

## 2023-09-22 RX ADMIN — LABETALOL HYDROCHLORIDE 200 MG: 200 TABLET, FILM COATED ORAL at 08:05

## 2023-09-22 RX ADMIN — MONTELUKAST 10 MG: 10 TABLET, FILM COATED ORAL at 08:05

## 2023-09-22 RX ADMIN — LORATADINE 10 MG: 10 TABLET ORAL at 08:05

## 2023-09-22 RX ADMIN — METRONIDAZOLE 500 MG: 500 TABLET ORAL at 08:04

## 2023-09-22 RX ADMIN — ACETAMINOPHEN 325MG 650 MG: 325 TABLET ORAL at 00:04

## 2023-09-22 RX ADMIN — KETOROLAC TROMETHAMINE 10 MG: 10 TABLET, FILM COATED ORAL at 08:03

## 2023-09-22 NOTE — UTILIZATION REVIEW
NOTIFICATION OF INPATIENT ADMISSION   MATERNITY/DELIVERY AUTHORIZATION REQUEST   SERVICING FACILITY:   [address] [place] [address] - L&D, [facility], NICU  [address]  Tax ID: [id number]  NPI: [id number] ATTENDING PROVIDER:  Attending Name and NPI#: Guerda Rodriguez Md [[id number]]  Address: [address]  Phone: [phone]     ADMISSION INFORMATION:  Place of Service: Inpatient [facility]  Place of Service Code: 21  Inpatient Admission Date/Time: 23  8:49 PM  Discharge Date/Time: 2023  1:30 PM  Admitting Diagnosis Code/Description:  Encounter for full-term uncomplicated delivery [Q50]     Mother: Britni Lopez 1984 Estimated Date of Delivery: 10/8/23  Delivering clinician: Guerda Rodriguez    OB History        1    Para   1    Term   1       0    AB   0    Living   1       SAB   0    IAB   0    Ectopic   0    Multiple   0    Live Births   1               [facility] Name & MRN:   Information for the patient's :  Rita Watkins Girl Radha Senate) [[id number]]      Delivery Information:  Sex: female  Delivered 2023 4:45 PM by Vaginal, Spontaneous; Gestational Age: 44w1d    [facility] Measurements:  Weight: 7 lb 3.7 oz (3280 g); Height: 20"    APGAR 1 minute 5 minutes 10 minutes   Totals: 9 9      [facility] Birth Information: 43 y.o. female MRN: [id number] Unit/Bed#: [facility]D [facility]   Birthweight: No birth weight on file. Gestational Age: <None> Delivery Type:    APGARS Totals:        UTILIZATION REVIEW CONTACT:  Keith Ocampo Utilization   Network Utilization Review Department  Phone: [phone]  Fax [phone]  Email: [email or web address]  Contact for approvals/pending authorizations, clinical reviews, and discharge.      PHYSICIAN ADVISORY SERVICES:  Medical Necessity Denial & Mdbg-sy-Eqrs Review  Phone: [phone]  Fax: [phone]  Email: [email or web address]

## 2023-09-22 NOTE — PROGRESS NOTES
Discharge teaching done with pt. Verbalized understanding, appropriate questions asked. Save your life magnet given and reviewed.

## 2023-09-22 NOTE — PLAN OF CARE
Problem: PAIN - ADULT  Goal: Verbalizes/displays adequate comfort level or baseline comfort level  Description: Interventions:  - Encourage patient to monitor pain and request assistance  - Assess pain using appropriate pain scale  - Administer analgesics based on type and severity of pain and evaluate response  - Implement non-pharmacological measures as appropriate and evaluate response  - Consider cultural and social influences on pain and pain management  - Notify physician/advanced practitioner if interventions unsuccessful or patient reports new pain  Outcome: Progressing     Problem: INFECTION - ADULT  Goal: Absence or prevention of progression during hospitalization  Description: INTERVENTIONS:  - Assess and monitor for signs and symptoms of infection  - Monitor lab/diagnostic results  - Monitor all insertion sites, i.e. indwelling lines, tubes, and drains  - Monitor endotracheal if appropriate and nasal secretions for changes in amount and color  - [place] appropriate cooling/warming therapies per order  - Administer medications as ordered  - Instruct and encourage patient and family to use good hand hygiene technique  - Identify and instruct in appropriate isolation precautions for identified infection/condition  Outcome: Progressing  Goal: Absence of fever/infection during neutropenic period  Description: INTERVENTIONS:  - Monitor WBC    Outcome: Progressing     Problem: SAFETY ADULT  Goal: Patient will remain free of falls  Description: INTERVENTIONS:  - Educate patient/family on patient safety including physical limitations  - Instruct patient to call for assistance with activity   - Consult OT/PT to assist with strengthening/mobility   - Keep Call bell within reach  - Keep bed low and locked with side rails adjusted as appropriate  - Keep care items and personal belongings within reach  - Initiate and maintain comfort rounds  Outcome: Progressing  Goal: Maintain or return to baseline ADL function  Description: INTERVENTIONS:  -  Assess patient's ability to carry out ADLs; assess patient's baseline for ADL function and identify physical deficits which impact ability to perform ADLs (bathing, care of mouth/teeth, toileting, grooming, dressing, etc.)  - Assess/evaluate cause of self-care deficits   - Assess range of motion  - Assess patient's mobility; develop plan if impaired  - Assess patient's need for assistive devices and provide as appropriate  - Encourage maximum independence but intervene and supervise when necessary  - Involve family in performance of ADLs  - Assess for home care needs following discharge   - Consider OT consult to assist with ADL evaluation and planning for discharge  - Provide patient education as appropriate  Outcome: Progressing  Goal: Maintains/Returns to pre admission functional level  Description: INTERVENTIONS:  - Perform BMAT or MOVE assessment daily.   - Set and communicate daily mobility goal to care team and patient/family/caregiver. - Collaborate with rehabilitation services on mobility goals if consulted  - Record patient progress and toleration of activity level   Outcome: Progressing     Problem: Knowledge Deficit  Goal: Patient/family/caregiver demonstrates understanding of disease process, treatment plan, medications, and discharge instructions  Description: Complete learning assessment and assess knowledge base.   Interventions:  - Provide teaching at level of understanding  - Provide teaching via preferred learning methods  Outcome: Progressing     Problem: DISCHARGE PLANNING  Goal: Discharge to home or other facility with appropriate resources  Description: INTERVENTIONS:  - Identify barriers to discharge w/patient and caregiver  - Arrange for needed discharge resources and transportation as appropriate  - Identify discharge learning needs (meds, wound care, etc.)  - Arrange for interpretive services to assist at discharge as needed  - Refer to Case Management Department for coordinating discharge planning if the patient needs post-hospital services based on physician/advanced practitioner order or complex needs related to functional status, cognitive ability, or social support system  Outcome: Progressing

## 2023-09-22 NOTE — PROGRESS NOTES
Progress Note - OB/GYN  Collins Hernandez 43 y.o. female MRN: [id number]  Unit/Bed#: L&D 306-01 Encounter: [id number]    Assessment and [address] Josh is a patient of: [facility] (Dr. Nichelle Gomez). She is PPD# 2 s/p  spontaneous vaginal delivery  Recovering well and is stable       Other immediate postpartum hemorrhage  Assessment & Plan  Secondary to Cervical laceration  QBL 1500  Hgb 11.8 -> 9.3 -> 7.5, s/p venofer -> 8.0  Asymptomatic     History of pulmonary embolism  Assessment & Plan  lovenox 40 mg qD for 6w postpartum    Mild intermittent asthma without complication  Assessment & Plan  Albuterol PRN    Chronic hypertension affecting pregnancy  Assessment & Plan  CBC, CMP wnl, P:C 8.75  Systolic (09YYS), ZVC:594 , Min:117 , CQH:702   Diastolic (42TAP), TSM:34, Min:57, Max:76  Monitor blood pressures and for symptoms of preeclampsia    *  (spontaneous vaginal delivery)  Assessment & Plan  Lochia WNL   Recovering well   Appropriate bowel and bladder function   Pain well controlled   Tolerating diet   Ambulating without issues   No lower extremity tenderness  GBS bacteruria, adequately treated   Rh positive    Multiple Manual extractions of clots, s/p Cefoxitin 1g         Disposition    - Anticipate discharge home on PPD# 2      Subjective/Objective     Chief Complaint: Postpartum State     Subjective:    Collins Hernandez is PPD#2 s/p  spontaneous vaginal delivery. Her perineal pain is improving. Ice packs and oral medication are effective. Patient reports rash on left side of neck and face. She has a erythromycin allergy and likely had contact with ointment form baby. No signs of anaphylaxis. Pain is well controlled. Patient is currently voiding. She is ambulating. Patient is currently passing flatus and has had bowel movement. She is tolerating PO, and denies nausea or vomitting. Patient denies fever, chills, chest pain, shortness of breath, or calf tenderness.  Roslyn Villarreal is normal. She is  Breastfeeding. She is recovering well and is stable. Vitals:   /57 (BP Location: Left arm)   Pulse 84   Temp 98.1 °F (36.7 °C) (Oral)   Resp 18   Ht 5' 11" (1.803 m)   Wt 119 kg (262 lb)   LMP 01/06/2023   SpO2 99%   Breastfeeding No   BMI 36.54 kg/m²     No intake or output data in the 24 hours ending 09/22/23 0658    Invasive Devices     Peripheral Intravenous Line  Duration           Peripheral IV 09/18/23 Dorsal (posterior); Right Hand 3 days                Physical Exam:   GEN: Adelso Kenney appears well, alert and oriented x 3, pleasant and cooperative   CARDIO: RRR, no murmurs or rubs  RESP:  CTAB, no wheezes or rales  ABDOMEN: soft, no tenderness, no distention, fundus @ U-3  EXTREMITIES: non tender, no erythema, b/l Salo's sign negative      Labs:     Hemoglobin   Date Value Ref Range Status   09/21/2023 8.0 (L) 11.5 - 15.4 g/dL Final   09/21/2023 7.5 (L) 11.5 - 15.4 g/dL Final     WBC   Date Value Ref Range Status   09/21/2023 18.42 (H) 4.31 - 10.16 Thousand/uL Final   09/20/2023 24.24 (H) 4.31 - 10.16 Thousand/uL Final     Platelets   Date Value Ref Range Status   09/21/2023 210 149 - 390 Thousands/uL Final   09/20/2023 200 149 - 390 Thousands/uL Final     Creatinine   Date Value Ref Range Status   09/18/2023 0.75 0.60 - 1.30 mg/dL Final     Comment:     Standardized to IDMS reference method   09/15/2023 0.82 0.60 - 1.30 mg/dL Final     Comment:     Standardized to IDMS reference method     AST   Date Value Ref Range Status   09/18/2023 15 13 - 39 U/L Final   09/15/2023 16 13 - 39 U/L Final     ALT   Date Value Ref Range Status   09/18/2023 17 7 - 52 U/L Final     Comment:     Specimen collection should occur prior to Sulfasalazine administration due to the potential for falsely depressed results.     09/15/2023 17 7 - 52 U/L Final     Comment:     Specimen collection should occur prior to Sulfasalazine administration due to the potential for falsely depressed results.            Adams Bernardo MD  9/22/2023  6:58 AM

## 2023-09-22 NOTE — DISCHARGE INSTRUCTIONS
Anemia   AMBULATORY CARE:   Anemia  is a low number of red blood cells or a low amount of hemoglobin in your red blood cells. Hemoglobin is a protein that helps carry oxygen throughout your body. Red blood cells use iron to create hemoglobin. Anemia may develop if your body does not have enough iron. It may also develop if your body does not make enough red blood cells or they die faster than your body can make them. Common symptoms include the following:   Chest pain or a fast heartbeat    Lightheadedness, dizziness, or shortness of breath    Cold or pale skin    Tiredness, weakness, or confusion    Call your local emergency number (911 in the [address]), or have someone call if:   You lose consciousness. You have severe chest pain. Seek care immediately if:   You have dark or bloody bowel movements. Call your doctor if:   Your symptoms are worse, even after treatment. You have questions or concerns about your condition or care. Treatment for anemia  may include any of the following:  Iron or folic acid supplements  help increase your red blood cell and hemoglobin levels. Vitamin B12 injections  may help boost your red blood cell level and decrease your symptoms. Ask your healthcare provider how to inject B12. Prevent anemia:  Eat healthy foods rich in iron and vitamin C. Nuts, meat, dark leafy green vegetables, and beans are high in iron and protein. Vitamin C helps your body absorb iron. Foods rich in vitamin C include oranges and other citrus fruits. Ask your healthcare provider for a list of other foods that are high in iron or vitamin C. Ask if you need to be on a special diet. Follow up with your doctor as directed:  Write down your questions so you remember to ask them during your visits. © Copyright Keenan Hayden 2023 Information is for End User's use only and may not be sold, redistributed or otherwise used for commercial purposes. The above information is an  only. It is not intended as medical advice for individual conditions or treatments. Talk to your doctor, nurse or pharmacist before following any medical regimen to see if it is safe and effective for you. Hypertension During Pregnancy   WHAT YOU NEED TO KNOW:   Hypertension is high blood pressure (BP). Normal BP is 119/79 or lower. Hypertension during pregnancy is a BP of 140/90 or higher. Severe hypertension is 160/110 or higher. One or both numbers of these readings may be high. Hypertension may start before you become pregnant, or develop during pregnancy. Pregnancy can cause high BP, or it may develop because of other risk factors you had before you became pregnant. It is important to get screened and treated for an elevated BP or hypertension during pregnancy. This can prevent problems for you and your baby. DISCHARGE INSTRUCTIONS:   Call your local emergency number (911 in the [place]), or have someone else call if:   You have a seizure. You have chest pain. You faint or lose consciousness. You have trouble breathing. You have any of the following signs of a heart attack:      Squeezing, pressure, or pain in your chest    You may  also have any of the following:     Discomfort or pain in your back, neck, jaw, stomach, or arm    Shortness of breath    Nausea or vomiting    Lightheadedness or a sudden cold sweat    Return to the emergency department if:   You have a severe headache or vision loss. You have weakness in an arm or leg. You urinate less than usual or stop urinating. You have fluid or blood leaking from your vagina that does not stop. You feel a gush of fluid from your vagina. You have severe abdominal pain with or without nausea and vomiting. You feel a change in your baby's movement, or you feel fewer than 6 to 10 movements in an hour. Call your doctor or obstetrician if:   You have new or increased swelling in your face or hands, or sudden weight gain.     You have questions or concerns about your condition or care. Medicines: You may need any of the following:  Medicine  may be given to lower your BP. The dose of current BP medicine you take may be changed. Daily low-dose aspirin  may be recommended if you are at high risk for preeclampsia. Aspirin may help prevent preeclampsia or problems it can cause. Do not take aspirin unless directed by your healthcare provider. Take your medicine as directed. Contact your healthcare provider if you think your medicine is not helping or if you have side effects. Tell your provider if you are allergic to any medicine. Keep a list of the medicines, vitamins, and herbs you take. Include the amounts, and when and why you take them. Bring the list or the pill bottles to follow-up visits. Carry your medicine list with you in case of an emergency. Manage hypertension during pregnancy:   Go to all scheduled appointments. Your healthcare providers will check your blood pressure and may order other tests. Rest as directed. Your healthcare provider may tell you to rest more often if you have mild symptoms of preeclampsia. Check your BP as directed if you have chronic hypertension. Sit and rest for 5 minutes before you take your BP. Extend your arm and support it on a flat surface. Your arm should be at the same level as your heart. Follow the directions that came with your BP monitor. Take your BP as often as directed. Keep a record of your BP readings and bring it to your follow-up visits. Do not drink alcohol or smoke. Alcohol, nicotine, and other chemicals in cigarettes and cigars can increase your BP. They can also harm your baby. Ask your healthcare provider for information if you currently drink alcohol or smoke and need help to quit. E-cigarettes or smokeless tobacco still contain nicotine. Talk to your healthcare provider before you use these products. Eat healthy foods.   Healthy foods can help control your BP. Healthy foods include fruits, vegetables, whole-grain breads, low-fat dairy products, beans, lean meats, and fish. Ask if you need to be on a special diet. Exercise if directed. Exercise can help lower your BP. Ask your healthcare provider how much exercise you need and which exercise is right for you. Do kick counts as directed. You may need to keep track of how often your baby moves or kicks over a certain amount of time. Ask your obstetrician how to do kick counts and how often to do them. Check your weight each day. Weigh yourself every day before breakfast. Weight gain can be a sign of extra fluid in your body. Follow up with your doctor or obstetrician as directed:  Write down your questions so you remember to ask them during your visits. © Copyright Myra Skylar 2023 Information is for End User's use only and may not be sold, redistributed or otherwise used for commercial purposes. The above information is an  only. It is not intended as medical advice for individual conditions or treatments. Talk to your doctor, nurse or pharmacist before following any medical regimen to see if it is safe and effective for you.

## 2023-09-23 NOTE — PROGRESS NOTES
I supervised the genetic counselor  I was present in the office and reviewed and agree with the note.    Ruby Brandon MD

## 2023-09-25 DIAGNOSIS — J30.89 ALLERGIC RHINITIS DUE TO OTHER ALLERGIC TRIGGER, UNSPECIFIED SEASONALITY: ICD-10-CM

## 2023-09-25 PROBLEM — N76.0 BACTERIAL VAGINOSIS: Status: RESOLVED | Noted: 2023-08-31 | Resolved: 2023-09-25

## 2023-09-25 PROBLEM — O26.03 EXCESSIVE WEIGHT GAIN DURING PREGNANCY IN THIRD TRIMESTER: Status: RESOLVED | Noted: 2023-06-30 | Resolved: 2023-09-25

## 2023-09-25 PROBLEM — O99.340 DEPRESSION AFFECTING PREGNANCY: Status: RESOLVED | Noted: 2023-03-16 | Resolved: 2023-09-25

## 2023-09-25 PROBLEM — F32.A DEPRESSION AFFECTING PREGNANCY: Status: RESOLVED | Noted: 2023-03-16 | Resolved: 2023-09-25

## 2023-09-25 PROBLEM — O09.513 PRIMIGRAVIDA OF ADVANCED MATERNAL AGE IN THIRD TRIMESTER: Status: RESOLVED | Noted: 2023-05-19 | Resolved: 2023-09-25

## 2023-09-25 PROBLEM — O09.90 SUPERVISION OF HIGH-RISK PREGNANCY: Status: RESOLVED | Noted: 2023-02-21 | Resolved: 2023-09-25

## 2023-09-25 PROBLEM — Z14.8 CARRIER OF GENETIC DISORDER: Status: RESOLVED | Noted: 2023-02-21 | Resolved: 2023-09-25

## 2023-09-25 PROBLEM — O09.891 MEDICATION EXPOSURE DURING FIRST TRIMESTER OF PREGNANCY: Status: RESOLVED | Noted: 2023-03-16 | Resolved: 2023-09-25

## 2023-09-25 PROBLEM — B96.89 BACTERIAL VAGINOSIS: Status: RESOLVED | Noted: 2023-08-31 | Resolved: 2023-09-25

## 2023-09-25 RX ORDER — FLUTICASONE PROPIONATE 50 MCG
SPRAY, SUSPENSION (ML) NASAL
Qty: 24 ML | Refills: 3 | Status: SHIPPED | OUTPATIENT
Start: 2023-09-25

## 2023-09-25 NOTE — UTILIZATION REVIEW
NOTIFICATION OF ADMISSION DISCHARGE   This is a Notification of Discharge from [facility]. Please be advised that this patient has been discharge from our facility. Below you will find the admission and discharge date and time including the patient’s disposition. UTILIZATION REVIEW CONTACT:  Cherise Pringle  Utilization   Network Utilization Review Department  Phone: [phone] x carefully listen to the prompts. All voicemails are confidential.  Email: [email or web address]     ADMISSION INFORMATION  PRESENTATION DATE: 9/18/2023  7:52 PM  OBERVATION ADMISSION DATE:   INPATIENT ADMISSION DATE: 9/18/23  8:49 PM   DISCHARGE DATE: 9/22/2023  1:30 PM   DISPOSITION:Home/Self Care    IMPORTANT INFORMATION:  Send all requests for admission clinical reviews, approved or denied determinations and any other requests to dedicated fax number below belonging to the campus where the patient is receiving treatment.  List of dedicated fax numbers:  [place] DENIALS (Administrative/Medical Necessity) [phone] [address] [facility] (Maternity/NICU/Pediatrics) [phone]   [facility] [phone]   [facility] [phone] [address] [phone]   [address] [place] [address] [phone]   [facility] [phone]   [address] [place] [address] [phone]   [address] [place] [address] [phone] [address] [facility] [phone] [address] [facility] [address] [facility] [phone]

## 2023-09-25 NOTE — PROGRESS NOTES
Subjective       Chief Complaint   Patient presents with   • Postpartum Care     PPD score 12; patient states she feels very off        She has some headaches. She feels shivering, feels cold and lightheadedness. Her bleeding is moderate-2-3 pads/day. No fevers. She reports swelling of her feet, no blurry vision. She has no chest pain or SOB. She is not breastfeeding. She reports some breast pain. Pierce Otto is a 43 y.o. female who presents for a postpartum visit. She is 1 week postpartum following a spontaneous vaginal delivery. I have fully reviewed the prenatal and intrapartum course. The delivery was at 37.3 gestational weeks. Outcome: spontaneous vaginal delivery. Anesthesia: epidural. Postpartum course has been normal. Baby's course has been normal, except some weight loss. Baby is feeding by bottle - Similac 360. Bleeding moderate lochia. Bowel function is normal. Bladder function is normal. Patient is not sexually active. Contraception method is none. Postpartum depression screening: negative. The following portions of the patient's history were reviewed and updated as appropriate: allergies, current medications, past family history, past medical history, past social history, past surgical history and problem list.    Postpartum Depression: Low Risk  (2023)    [place]  Depression Scale    • Last EPDS Total Score: 2    • Last EPDS Self Harm Result: Never       Last PAP: 22  GDM:  no      Review of Systems   Constitutional: Negative. HENT: Negative. Eyes: Negative. Respiratory: Negative. Cardiovascular: Negative. Gastrointestinal: Negative. Endocrine: Negative. Genitourinary:        As noted in HPI   Musculoskeletal: Negative. Skin: Negative. Allergic/Immunologic: Negative. Neurological: Negative. Hematological: Negative. Psychiatric/Behavioral: Negative.           Objective     /80   Pulse 64   Temp 97.8 °F (36.6 °C) (Tympanic)   Ht 5' 11" (1.803 m)   Wt 118 kg (259 lb 12.8 oz)   SpO2 98%   BMI 36.23 kg/m²    General:  alert and oriented, in no acute distress   Abdomen: soft, non-tender; bowel sounds normal; no masses,  no organomegaly    +2 pitting edema                       Assessment/Plan     1 week postpartum exam  Chronic hypertension with exacerbation versus superimposed preeclampsia with severe features in the postpartum. 1. Contraception: none  2. Patient advised to proceed to labor and delivery for further evaluation and treatment of elevated blood pressures. Patient was counseled on possible need for admission if needed for blood pressure control and treatment. Notified L&D team of patient arrival.  Discussed case with SOD Dr. Severo Blade  3. Follow up in: 1 week or as needed.       Problem List Items Addressed This Visit        Cardiovascular and Mediastinum    Chronic hypertension affecting pregnancy       Genitourinary    Other immediate postpartum hemorrhage       Other     (spontaneous vaginal delivery) - Primary   Other Visit Diagnoses     Postpartum anemia

## 2023-09-27 ENCOUNTER — HOSPITAL ENCOUNTER (OUTPATIENT)
Facility: HOSPITAL | Age: 38
End: 2023-09-27
Payer: COMMERCIAL

## 2023-09-27 ENCOUNTER — POSTPARTUM VISIT (OUTPATIENT)
Dept: OBGYN CLINIC | Facility: CLINIC | Age: 38
End: 2023-09-27

## 2023-09-27 ENCOUNTER — HOSPITAL ENCOUNTER (OUTPATIENT)
Facility: HOSPITAL | Age: 38
Discharge: HOME | End: 2023-09-27
Attending: OBSTETRICS & GYNECOLOGY | Admitting: OBSTETRICS & GYNECOLOGY
Payer: COMMERCIAL

## 2023-09-27 VITALS
BODY MASS INDEX: 36.36 KG/M2 | RESPIRATION RATE: 16 BRPM | SYSTOLIC BLOOD PRESSURE: 141 MMHG | DIASTOLIC BLOOD PRESSURE: 63 MMHG | TEMPERATURE: 98.1 F | HEART RATE: 57 BPM | HEIGHT: 71 IN | WEIGHT: 259.7 LBS

## 2023-09-27 VITALS
HEART RATE: 64 BPM | DIASTOLIC BLOOD PRESSURE: 100 MMHG | TEMPERATURE: 97.8 F | OXYGEN SATURATION: 98 % | BODY MASS INDEX: 36.37 KG/M2 | WEIGHT: 259.8 LBS | SYSTOLIC BLOOD PRESSURE: 170 MMHG | HEIGHT: 71 IN

## 2023-09-27 DIAGNOSIS — O10.919 CHRONIC HYPERTENSION AFFECTING PREGNANCY: ICD-10-CM

## 2023-09-27 DIAGNOSIS — I10 HTN (HYPERTENSION): Primary | ICD-10-CM

## 2023-09-27 LAB
ALBUMIN SERPL BCP-MCNC: 3.2 G/DL (ref 3.5–5)
ALP SERPL-CCNC: 59 U/L (ref 34–104)
ALT SERPL W P-5'-P-CCNC: 31 U/L (ref 7–52)
ANION GAP SERPL CALCULATED.3IONS-SCNC: 6 MMOL/L
AST SERPL W P-5'-P-CCNC: 16 U/L (ref 13–39)
BASOPHILS # BLD MANUAL: 0 THOUSAND/UL (ref 0–0.1)
BASOPHILS NFR MAR MANUAL: 0 % (ref 0–1)
BILIRUB SERPL-MCNC: 0.25 MG/DL (ref 0.2–1)
BUN SERPL-MCNC: 11 MG/DL (ref 5–25)
CALCIUM ALBUM COR SERPL-MCNC: 9.3 MG/DL (ref 8.3–10.1)
CALCIUM SERPL-MCNC: 8.7 MG/DL (ref 8.4–10.2)
CHLORIDE SERPL-SCNC: 109 MMOL/L (ref 96–108)
CO2 SERPL-SCNC: 25 MMOL/L (ref 21–32)
CREAT SERPL-MCNC: 1.02 MG/DL (ref 0.6–1.3)
CREAT UR-MCNC: 54.2 MG/DL
EOSINOPHIL # BLD MANUAL: 0.41 THOUSAND/UL (ref 0–0.4)
EOSINOPHIL NFR BLD MANUAL: 4 % (ref 0–6)
ERYTHROCYTE [DISTWIDTH] IN BLOOD BY AUTOMATED COUNT: 15.4 % (ref 11.6–15.1)
GFR SERPL CREATININE-BSD FRML MDRD: 69 ML/MIN/1.73SQ M
GLUCOSE SERPL-MCNC: 81 MG/DL (ref 65–140)
HCT VFR BLD AUTO: 25.6 % (ref 34.8–46.1)
HGB BLD-MCNC: 7.9 G/DL (ref 11.5–15.4)
LYMPHOCYTES # BLD AUTO: 2.66 THOUSAND/UL (ref 0.6–4.47)
LYMPHOCYTES # BLD AUTO: 26 % (ref 14–44)
MCH RBC QN AUTO: 29.2 PG (ref 26.8–34.3)
MCHC RBC AUTO-ENTMCNC: 30.9 G/DL (ref 31.4–37.4)
MCV RBC AUTO: 95 FL (ref 82–98)
MONOCYTES # BLD AUTO: 0.72 THOUSAND/UL (ref 0–1.22)
MONOCYTES NFR BLD: 7 % (ref 4–12)
NEUTROPHILS # BLD MANUAL: 6.45 THOUSAND/UL (ref 1.85–7.62)
NEUTS BAND NFR BLD MANUAL: 8 % (ref 0–8)
NEUTS SEG NFR BLD AUTO: 55 % (ref 43–75)
PLATELET # BLD AUTO: 342 THOUSANDS/UL (ref 149–390)
PLATELET BLD QL SMEAR: ADEQUATE
PMV BLD AUTO: 9.2 FL (ref 8.9–12.7)
POTASSIUM SERPL-SCNC: 3.9 MMOL/L (ref 3.5–5.3)
PROT SERPL-MCNC: 6.1 G/DL (ref 6.4–8.4)
PROT UR-MCNC: 11 MG/DL
PROT/CREAT UR: 0.2 MG/G{CREAT} (ref 0–0.1)
RBC # BLD AUTO: 2.71 MILLION/UL (ref 3.81–5.12)
RBC MORPH BLD: NORMAL
SODIUM SERPL-SCNC: 140 MMOL/L (ref 135–147)
WBC # BLD AUTO: 10.24 THOUSAND/UL (ref 4.31–10.16)

## 2023-09-27 PROCEDURE — 99215 OFFICE O/P EST HI 40 MIN: CPT

## 2023-09-27 PROCEDURE — 99214 OFFICE O/P EST MOD 30 MIN: CPT | Performed by: OBSTETRICS & GYNECOLOGY

## 2023-09-27 PROCEDURE — 80053 COMPREHEN METABOLIC PANEL: CPT | Performed by: OBSTETRICS & GYNECOLOGY

## 2023-09-27 PROCEDURE — 82570 ASSAY OF URINE CREATININE: CPT | Performed by: OBSTETRICS & GYNECOLOGY

## 2023-09-27 PROCEDURE — 85007 BL SMEAR W/DIFF WBC COUNT: CPT | Performed by: OBSTETRICS & GYNECOLOGY

## 2023-09-27 PROCEDURE — 85027 COMPLETE CBC AUTOMATED: CPT | Performed by: OBSTETRICS & GYNECOLOGY

## 2023-09-27 PROCEDURE — 84156 ASSAY OF PROTEIN URINE: CPT | Performed by: OBSTETRICS & GYNECOLOGY

## 2023-09-27 PROCEDURE — G0463 HOSPITAL OUTPT CLINIC VISIT: HCPCS

## 2023-09-27 RX ORDER — TOPIRAMATE 100 MG/1
100 TABLET, FILM COATED ORAL 2 TIMES DAILY
Qty: 60 TABLET | Refills: 11 | Status: SHIPPED | OUTPATIENT
Start: 2023-09-27

## 2023-09-27 RX ADMIN — IRON SUCROSE 300 MG: 20 INJECTION, SOLUTION INTRAVENOUS at 14:09

## 2023-09-27 NOTE — PROGRESS NOTES
L&D Triage Note - OB/GYN  Keith Barclay 43 y.o. female MRN: [id number]  Unit/Bed#: L&D 327-01 Encounter: [id number]      ASSESSMENT:    Keith Barclay is a 43 y.o. Yumiko Pointer s/p  PPD#7 with chronic hypertension and anemia not pre-eclamptic with SF    PLAN:    1) Pre-eclamptic labs  2) Serial BP's- wnl   3) Venofer 300mg for anemia  4) Discharge home  with Pre-eclampsia precautions  5) Follow up in week with Dr. Olive Wiggins   6) H/O migraines with recent flare up will restart Topamax 100mg bid          SUBJECTIVE:    Estrellita Lucero 43 y.o. Yumiko Pointer s/p  PPD#7 who was sent from the office for elevated BP's to r/o pre-eclampsia with SF      Her current obstetrical history is significant for  on 23      OBJECTIVE:    Vitals:    23 1421   BP: 141/82   Pulse: (!) 53   Resp:    Temp:        ROS:  Constitutional: Negative  Respiratory: Negative  Cardiovascular: Negative    Gastrointestinal: Negative    General Physical Exam:  General: in no apparent distress  Cardiovascular: Cor RRR  Lungs: non-labored breathing  Abdomen: abdomen is soft without significant tenderness, masses, organomegaly or guarding  Lower extremeties: pitting edema        Facundo Warner DO  OBGYN PGY-2  2023 3:22 PM

## 2023-09-28 LAB — PLACENTA IN STORAGE: NORMAL

## 2023-09-29 ENCOUNTER — TELEPHONE (OUTPATIENT)
Dept: PSYCHIATRY | Facility: CLINIC | Age: 38
End: 2023-09-29

## 2023-10-03 NOTE — PROGRESS NOTES
Assessment/Plan:    Problem List Items Addressed This Visit    None  Visit Diagnoses     UTI symptoms    -  Primary    Relevant Orders    Urine culture    Urinalysis with microscopic    Postpartum depression        Relevant Medications    FLUoxetine (PROzac) 40 MG capsule    Other Relevant Orders    Ambulatory Referral to [address] [place] [address] Therapists    Anxiety and depression        Relevant Medications    FLUoxetine (PROzac) 40 MG capsule    Other Relevant Orders    Ambulatory Referral to [address] [place] [address] Therapists    Postpartum anemia        Relevant Orders    CBC    Ferritin    TIBC Panel (incl. Iron, TIBC, % Iron Saturation)    Vitamin B12    Folate    Sensation of both heat and cold        Relevant Orders    TSH, 3rd generation with Free T4 reflex    Leg pain, bilateral        Relevant Orders    VAS lower limb venous duplex study, complete bilateral    History of deep vein thrombosis        Relevant Orders    VAS lower limb venous duplex study, complete bilateral        Continue LOVENOX until 6 weeks postpartum. Fluoxetine was prescribed due to postpartum depression and history of anxiety and depression. Advised patient to follow-up with PCP regarding anxiety and depression and to make an appointment with behavioral health therapist.  Patient was follow-up with PCP regarding postpartum depression and anxiety. Patient previously treated for depression and anxiety with Prozac 40 mg by her PCP. Patient with blood pressures that are currently nonsevere range. Continue labetalol 200 mg twice daily and continue blood pressure checks at home. Patient reports heat/cold intolerance,. IVAN was ordered. Patient with history of anemia with hemoglobin of 7.9. Advise repeat labs. Consider IV iron infusion. Follow-up in 6 weeks for postpartum visit    BL leg pain not initially addressed at this visit -   Patient asked to return and examine her legs. She reports that she feels crampy at night. Discussed possible restless legs due to anemia and low iron. Legs appear normal without redness or tenderness. stat doppler US was ordered due to history of DVT to rule out DVT. Subjective   Patient ID: Hayes Grider is a 43 y.o. female. Patient is here for a follow-up. Chief Complaint   Patient presents with   • Postpartum Care     ER f/u for elevated BP. PPD= 11   • Urinary Tract Infection     Pt has urinary burning, frequency   • Other     Pt reports being cold frequently and then gets night sweats. • Leg Pain     Pt with bilateral leg pain between both legs, no redness. Patient is here for follow-up due to chronic hypertension with exacerbation. She was previously in the hospital last week for observation. Blood pressures did not elicit severe ranges and she was discharged to home with her home medications of labetalol 200 mg twice daily. She is taking Motrin for migraines. Patient is 2 weeks postpartum from a vaginal delivery. Delivery was complicated by postpartum hemorrhage and cervical laceration. She is formula feeding. She is stating her bleeding is moderate, small clots. She feels some dizziness. She reports crying a lot. No suicidal thoughts/no thoughts of harming baby. She was  Prozac 40 mg - she is seeing PCP on Fri    She was seeing a therapist for years      BPs at home 130-148/80-90      Menstrual History:  OB History        2    Para   1    Term   1       0    AB   0    Living   1       SAB   0    IAB   0    Ectopic   0    Multiple   0    Live Births   1                  Patient's last menstrual period was 2023.          Past Medical History:   Diagnosis Date   • Asthma    • Depression    • GERD (gastroesophageal reflux disease)    • Hypertension    • IC (interstitial cystitis)    • Migraines    • PCOS (polycystic ovarian syndrome)    • Pulmonary embolism ([address])    • Venous insufficiency        Past Surgical History:   Procedure Laterality Date   • ADENOIDECTOMY     • CHOLECYSTECTOMY     • LAPAROSCOPY     • TONSILLECTOMY         Social History     Tobacco Use   • Smoking status: Never   • Smokeless tobacco: Never   Vaping Use   • Vaping Use: Never used   Substance Use Topics   • Alcohol use: Not Currently   • Drug use: Never       Allergies   Allergen Reactions   • Erythromycin Swelling   • Ethylenediamine Dermatitis, Hives, Itching and Swelling   • Propolis Dermatitis, Hives, Itching, Swelling and Rash   • Sumatriptan Chest Pain and Palpitations     to all TRIPTANS   • Triptans Palpitations     flushing         Current Outpatient Medications:   •  acetaminophen (TYLENOL) 325 mg tablet, Take 2 tablets (650 mg total) by mouth every 6 (six) hours as needed for mild pain or moderate pain, Disp: , Rfl: 0  •  albuterol (Ventolin HFA) 90 mcg/act inhaler, Inhale 2 puffs every 4 (four) hours as needed for wheezing or shortness of breath, Disp: 1 Inhaler, Rfl: 1  •  aspirin-acetaminophen-caffeine (EXCEDRIN MIGRAINE) 250-250-65 MG per tablet, Take 1 tablet by mouth every 6 (six) hours as needed for headaches, Disp: , Rfl:   •  docusate sodium (COLACE) 250 MG capsule, Take 250 mg by mouth daily, Disp: , Rfl:   •  ferrous sulfate 324 (65 Fe) mg, Take 1 tablet (324 mg total) by mouth 2 (two) times a day before meals, Disp: 180 tablet, Rfl: 3  •  FLUoxetine (PROzac) 40 MG capsule, Take 1 capsule (40 mg total) by mouth daily, Disp: 90 capsule, Rfl: 2  •  fluticasone (FLONASE) 50 mcg/act nasal spray, SPRAY 1 SPRAY INTO EACH NOSTRIL EVERY DAY, Disp: 24 mL, Rfl: 3  •  ibuprofen (MOTRIN) 200 mg tablet, Take 3 tablets (600 mg total) by mouth every 6 (six) hours as needed for moderate pain, Disp: , Rfl:   •  labetalol (NORMODYNE) 200 mg tablet, Take 1 tablet (200 mg total) by mouth 2 (two) times a day, Disp: 180 tablet, Rfl: 0  •  loratadine (CLARITIN) 10 mg tablet, Take 10 mg by mouth daily, Disp: , Rfl:   •  montelukast (SINGULAIR) 10 mg tablet, Take 10 mg by mouth daily, Disp: , Rfl:   •  topiramate (Topamax) 100 mg tablet, Take 1 tablet (100 mg total) by mouth 2 (two) times a day, Disp: 60 tablet, Rfl: 11  •  enoxaparin (LOVENOX) 40 mg/0.4 mL, Inject 0.4 mL (40 mg total) under the skin in the morning for 30 doses, Disp: 12 mL, Rfl: 3      Review of Systems   Constitutional: Negative. HENT: Negative. Eyes: Negative. Respiratory: Negative. Cardiovascular: Negative. Gastrointestinal: Negative. Endocrine: Negative. Genitourinary:        As noted in HPI   Musculoskeletal: Negative. Skin: Negative. Allergic/Immunologic: Negative. Neurological: Negative. Hematological: Negative. Psychiatric/Behavioral: Negative. /80 (BP Location: Right arm, Patient Position: Sitting, Cuff Size: Adult)   Pulse 74   Temp 98.4 °F (36.9 °C) (Tympanic)   Ht 5' 11" (1.803 m)   Wt 110 kg (243 lb)   LMP 01/06/2023   BMI 33.89 kg/m²       Physical Exam  Constitutional:       General: She is not in acute distress. Appearance: She is well-developed. Abdominal:      Palpations: Abdomen is soft. Tenderness: There is no abdominal tenderness. There is no guarding. Neurological:      Mental Status: She is alert and oriented to person, place, and time. Skin:     General: Skin is warm and dry.    Psychiatric:         Behavior: Behavior normal.           Future Appointments   Date Time Provider [address] [place] [address]   10/18/2023  2:00 PM Abby Cabello MD [facility]   11/8/2023 11:30 AM OW US M[facility] 1 OW [facility] US [facility]   11/16/2023  1:30 PM Henry Ramirez MD [facility]

## 2023-10-04 ENCOUNTER — APPOINTMENT (OUTPATIENT)
Dept: LAB | Facility: HOSPITAL | Age: 38
End: 2023-10-04
Payer: COMMERCIAL

## 2023-10-04 ENCOUNTER — POSTPARTUM VISIT (OUTPATIENT)
Dept: OBGYN CLINIC | Facility: CLINIC | Age: 38
End: 2023-10-04
Payer: COMMERCIAL

## 2023-10-04 ENCOUNTER — APPOINTMENT (OUTPATIENT)
Dept: LAB | Facility: CLINIC | Age: 38
End: 2023-10-04
Payer: COMMERCIAL

## 2023-10-04 VITALS
DIASTOLIC BLOOD PRESSURE: 80 MMHG | HEIGHT: 71 IN | WEIGHT: 243 LBS | BODY MASS INDEX: 34.02 KG/M2 | HEART RATE: 74 BPM | SYSTOLIC BLOOD PRESSURE: 140 MMHG | TEMPERATURE: 98.4 F

## 2023-10-04 DIAGNOSIS — R39.9 UTI SYMPTOMS: Primary | ICD-10-CM

## 2023-10-04 DIAGNOSIS — Z86.718 HISTORY OF DEEP VEIN THROMBOSIS: ICD-10-CM

## 2023-10-04 DIAGNOSIS — R44.8 SENSATION OF BOTH HEAT AND COLD: ICD-10-CM

## 2023-10-04 DIAGNOSIS — F41.9 ANXIETY AND DEPRESSION: ICD-10-CM

## 2023-10-04 DIAGNOSIS — M79.605 LEG PAIN, BILATERAL: ICD-10-CM

## 2023-10-04 DIAGNOSIS — F32.A ANXIETY AND DEPRESSION: ICD-10-CM

## 2023-10-04 DIAGNOSIS — M79.604 LEG PAIN, BILATERAL: ICD-10-CM

## 2023-10-04 LAB
BACTERIA UR QL AUTO: ABNORMAL /HPF
BILIRUB UR QL STRIP: NEGATIVE
CLARITY UR: CLEAR
COLOR UR: COLORLESS
ERYTHROCYTE [DISTWIDTH] IN BLOOD BY AUTOMATED COUNT: 14.8 % (ref 11.6–15.1)
FERRITIN SERPL-MCNC: 157 NG/ML (ref 11–307)
FOLATE SERPL-MCNC: 13.9 NG/ML
GLUCOSE UR STRIP-MCNC: NEGATIVE MG/DL
HCT VFR BLD AUTO: 36 % (ref 34.8–46.1)
HGB BLD-MCNC: 11.1 G/DL (ref 11.5–15.4)
HGB UR QL STRIP.AUTO: ABNORMAL
IRON SATN MFR SERPL: 28 % (ref 15–50)
IRON SERPL-MCNC: 78 UG/DL (ref 50–212)
KETONES UR STRIP-MCNC: NEGATIVE MG/DL
LEUKOCYTE ESTERASE UR QL STRIP: NEGATIVE
MCH RBC QN AUTO: 29.3 PG (ref 26.8–34.3)
MCHC RBC AUTO-ENTMCNC: 30.8 G/DL (ref 31.4–37.4)
MCV RBC AUTO: 95 FL (ref 82–98)
NITRITE UR QL STRIP: NEGATIVE
NON-SQ EPI CELLS URNS QL MICRO: ABNORMAL /HPF
PH UR STRIP.AUTO: 7 [PH]
PLATELET # BLD AUTO: 405 THOUSANDS/UL (ref 149–390)
PMV BLD AUTO: 9.4 FL (ref 8.9–12.7)
PROT UR STRIP-MCNC: NEGATIVE MG/DL
RBC # BLD AUTO: 3.79 MILLION/UL (ref 3.81–5.12)
RBC #/AREA URNS AUTO: ABNORMAL /HPF
SP GR UR STRIP.AUTO: 1.01 (ref 1–1.03)
TIBC SERPL-MCNC: 274 UG/DL (ref 250–450)
TSH SERPL DL<=0.05 MIU/L-ACNC: 0.96 UIU/ML (ref 0.45–4.5)
UIBC SERPL-MCNC: 196 UG/DL (ref 155–355)
UROBILINOGEN UR STRIP-ACNC: <2 MG/DL
VIT B12 SERPL-MCNC: 189 PG/ML (ref 180–914)
WBC # BLD AUTO: 7.47 THOUSAND/UL (ref 4.31–10.16)
WBC #/AREA URNS AUTO: ABNORMAL /HPF

## 2023-10-04 PROCEDURE — 85027 COMPLETE CBC AUTOMATED: CPT

## 2023-10-04 PROCEDURE — 83550 IRON BINDING TEST: CPT

## 2023-10-04 PROCEDURE — 81001 URINALYSIS AUTO W/SCOPE: CPT | Performed by: OBSTETRICS & GYNECOLOGY

## 2023-10-04 PROCEDURE — 99213 OFFICE O/P EST LOW 20 MIN: CPT | Performed by: OBSTETRICS & GYNECOLOGY

## 2023-10-04 PROCEDURE — 82746 ASSAY OF FOLIC ACID SERUM: CPT

## 2023-10-04 PROCEDURE — 82607 VITAMIN B-12: CPT

## 2023-10-04 PROCEDURE — 84443 ASSAY THYROID STIM HORMONE: CPT

## 2023-10-04 PROCEDURE — 36415 COLL VENOUS BLD VENIPUNCTURE: CPT

## 2023-10-04 PROCEDURE — 82728 ASSAY OF FERRITIN: CPT

## 2023-10-04 PROCEDURE — 83540 ASSAY OF IRON: CPT

## 2023-10-04 PROCEDURE — 87086 URINE CULTURE/COLONY COUNT: CPT | Performed by: OBSTETRICS & GYNECOLOGY

## 2023-10-04 RX ORDER — FLUOXETINE HYDROCHLORIDE 40 MG/1
40 CAPSULE ORAL DAILY
Qty: 90 CAPSULE | Refills: 2 | Status: SHIPPED | OUTPATIENT
Start: 2023-10-04

## 2023-10-05 ENCOUNTER — TELEPHONE (OUTPATIENT)
Dept: OBGYN CLINIC | Facility: CLINIC | Age: 38
End: 2023-10-05

## 2023-10-05 ENCOUNTER — HOSPITAL ENCOUNTER (OUTPATIENT)
Dept: NON INVASIVE DIAGNOSTICS | Facility: HOSPITAL | Age: 38
Discharge: HOME | End: 2023-10-05
Attending: OBSTETRICS & GYNECOLOGY
Payer: COMMERCIAL

## 2023-10-05 ENCOUNTER — DOCUMENTATION (OUTPATIENT)
Dept: OBGYN CLINIC | Facility: CLINIC | Age: 38
End: 2023-10-05

## 2023-10-05 DIAGNOSIS — Z86.718 HISTORY OF DEEP VEIN THROMBOSIS: ICD-10-CM

## 2023-10-05 DIAGNOSIS — M79.605 LEG PAIN, BILATERAL: ICD-10-CM

## 2023-10-05 DIAGNOSIS — M79.604 LEG PAIN, BILATERAL: ICD-10-CM

## 2023-10-05 PROCEDURE — 93970 EXTREMITY STUDY: CPT

## 2023-10-05 PROCEDURE — 93970 EXTREMITY STUDY: CPT | Performed by: SURGERY

## 2023-10-05 RX ORDER — FLUOXETINE HYDROCHLORIDE 20 MG/1
20 CAPSULE ORAL DAILY
Qty: 90 CAPSULE | Refills: 1 | Status: SHIPPED | OUTPATIENT
Start: 2023-10-05 | End: 2024-04-02

## 2023-10-05 NOTE — TELEPHONE ENCOUNTER
Patient called in and stated she gave you the wrong dose of prozac at her visit. She was on 20mg of prozac and 40mg of prilosec. Patient wants to know if you'd like her to start on the 40mg of prilosec or if it will be adjusted to 20mg.

## 2023-10-07 LAB — BACTERIA UR CULT: NORMAL

## 2023-10-12 ENCOUNTER — APPOINTMENT (OUTPATIENT)
Dept: LAB | Facility: HOSPITAL | Age: 38
End: 2023-10-12
Payer: COMMERCIAL

## 2023-10-12 ENCOUNTER — TELEPHONE (OUTPATIENT)
Dept: OBGYN CLINIC | Facility: CLINIC | Age: 38
End: 2023-10-12

## 2023-10-12 DIAGNOSIS — D50.9 IRON DEFICIENCY ANEMIA, UNSPECIFIED IRON DEFICIENCY ANEMIA TYPE: ICD-10-CM

## 2023-10-12 DIAGNOSIS — D50.9 IRON DEFICIENCY ANEMIA, UNSPECIFIED IRON DEFICIENCY ANEMIA TYPE: Primary | ICD-10-CM

## 2023-10-12 LAB
ALBUMIN SERPL BCP-MCNC: 4.4 G/DL (ref 3.5–5)
ALP SERPL-CCNC: 54 U/L (ref 34–104)
ALT SERPL W P-5'-P-CCNC: 21 U/L (ref 7–52)
ANION GAP SERPL CALCULATED.3IONS-SCNC: 7 MMOL/L
AST SERPL W P-5'-P-CCNC: 17 U/L (ref 13–39)
BILIRUB SERPL-MCNC: 0.33 MG/DL (ref 0.2–1)
BUN SERPL-MCNC: 19 MG/DL (ref 5–25)
CALCIUM SERPL-MCNC: 9.9 MG/DL (ref 8.4–10.2)
CHLORIDE SERPL-SCNC: 107 MMOL/L (ref 96–108)
CO2 SERPL-SCNC: 25 MMOL/L (ref 21–32)
CREAT SERPL-MCNC: 1.12 MG/DL (ref 0.6–1.3)
ERYTHROCYTE [DISTWIDTH] IN BLOOD BY AUTOMATED COUNT: 13.2 % (ref 11.6–15.1)
FERRITIN SERPL-MCNC: 133 NG/ML (ref 11–307)
GFR SERPL CREATININE-BSD FRML MDRD: 62 ML/MIN/1.73SQ M
GLUCOSE P FAST SERPL-MCNC: 83 MG/DL (ref 65–99)
HCT VFR BLD AUTO: 41 % (ref 34.8–46.1)
HGB BLD-MCNC: 12.9 G/DL (ref 11.5–15.4)
MCH RBC QN AUTO: 28.7 PG (ref 26.8–34.3)
MCHC RBC AUTO-ENTMCNC: 31.5 G/DL (ref 31.4–37.4)
MCV RBC AUTO: 91 FL (ref 82–98)
PLATELET # BLD AUTO: 360 THOUSANDS/UL (ref 149–390)
PMV BLD AUTO: 9.5 FL (ref 8.9–12.7)
POTASSIUM SERPL-SCNC: 4.2 MMOL/L (ref 3.5–5.3)
PROT SERPL-MCNC: 7.2 G/DL (ref 6.4–8.4)
RBC # BLD AUTO: 4.49 MILLION/UL (ref 3.81–5.12)
SODIUM SERPL-SCNC: 139 MMOL/L (ref 135–147)
WBC # BLD AUTO: 6.28 THOUSAND/UL (ref 4.31–10.16)

## 2023-10-12 PROCEDURE — 82728 ASSAY OF FERRITIN: CPT

## 2023-10-12 PROCEDURE — 85027 COMPLETE CBC AUTOMATED: CPT

## 2023-10-12 PROCEDURE — 36415 COLL VENOUS BLD VENIPUNCTURE: CPT

## 2023-10-12 PROCEDURE — 80053 COMPREHEN METABOLIC PANEL: CPT

## 2023-10-12 NOTE — TELEPHONE ENCOUNTER
Pt is requesting lab work to check iron levels. Pt states she has been having dizziness and lightheadedness and is gradually feeling worse as the days go on. Last lab results were slightly abnormal and pt has continued her oral iron once a day.

## 2023-10-18 ENCOUNTER — TELEMEDICINE (OUTPATIENT)
Dept: NEUROLOGY | Facility: CLINIC | Age: 38
End: 2023-10-18
Payer: COMMERCIAL

## 2023-10-18 DIAGNOSIS — D18.00 CAVERNOMA: ICD-10-CM

## 2023-10-18 DIAGNOSIS — G43.709 CHRONIC MIGRAINE WITHOUT AURA WITHOUT STATUS MIGRAINOSUS, NOT INTRACTABLE: Primary | ICD-10-CM

## 2023-10-18 PROCEDURE — 99215 OFFICE O/P EST HI 40 MIN: CPT | Performed by: PSYCHIATRY & NEUROLOGY

## 2023-10-18 RX ORDER — LISINOPRIL 20 MG/1
20 TABLET ORAL DAILY
COMMUNITY
Start: 2023-10-06

## 2023-10-18 RX ORDER — RIZATRIPTAN BENZOATE 10 MG/1
10 TABLET ORAL ONCE AS NEEDED
Qty: 9 TABLET | Refills: 12 | Status: SHIPPED | OUTPATIENT
Start: 2023-10-18

## 2023-10-18 NOTE — PROGRESS NOTES
Virtual Regular Visit    Verification of patient location:    Patient is located at Home in the following state in which I hold an active license [place]      Assessment/Plan:    Problem List Items Addressed This Visit          Cardiovascular and Mediastinum    Migraine - Primary    Relevant Medications    rizatriptan (MAXALT) 10 mg tablet    Other Relevant Orders    MRI brain w wo contrast     Other Visit Diagnoses       Cavernoma        Relevant Orders    MRI brain w wo contrast                 Reason for visit is No chief complaint on file. Encounter provider Orquidea Durand MD    Provider located at [address] [place], [place] [id number] [facility] [address] [address] [phone]      Recent Visits  No visits were found meeting these conditions. Showing recent visits within past 7 days and meeting all other requirements  Today's Visits  Date Type Provider Dept   10/18/23 Telemedicine Orquidea Durand MD [facility] [address] [place] [address] today's visits and meeting all other requirements  Future Appointments  No visits were found meeting these conditions. Showing future appointments within next 150 days and meeting all other requirements       The patient was identified by name and date of birth. Rubin Clifford was informed that this is a telemedicine visit and that the visit is being conducted through the [address] [place] [address]. She agrees to proceed. .  My office door was closed. No one else was in the room. She acknowledged consent and understanding of privacy and security of the video platform. The patient has agreed to participate and understands they can discontinue the visit at any time. Patient is aware this is a billable service.      Subjective    Assessment/Plan:   Rubin Clifford is a very pleasant  43 y.o. female with a past medical history that includes asthma, hypertension, depression, GERD, BMI over 30, chronic back pain, interstitial cystitis, idiopathic hypersomnia on Adderall, history of PE 3/2009 b/l unknown (2009, coumadin 9 months, lovenox in pregnancy), migraines with and without aura, PCOS, hand paresthesias, ulnar entrapment at the elbow, poor sleep, endometriosis, status postcholecystectomy, idiopathic hypersomnia, hyperlipidemia, venous insufficiency referred here for evaluation of headache. My initial evaluation 5/30/2023    Chronic migraine without aura without status migrainosus, not intractable  Features of idiopathic intracranial hypertension without papilledema based on imaging not meeting criteria for IIH  Not breast feeding   Idiopathic hypersomnia (multiple sleep tests, MLST 11/2022 no narcolepsy-idiopathic hypersomnia, PSG 10/20/2021, for apneas, average O2 92% minimum 85%, nearly 6 minutes below 90%)  She reports a long history of headaches dating back to childhood that worsened in high school. She reports she has been on blood pressure pills since age 24. She had issues with infertility and was planning on doing IUI when she got pregnant on topiramate 100 mg twice daily through other providers and stopped it quickly. She comes to see me today due to currently being 21 weeks pregnant and increasing headaches off of topiramate. She reports pain is typically occipital bilaterally pressure stabbing and dull, bitemporal, retro-orbital and can be bilateral but more often on the right. She denies typical aura and reports typical associated migrainous features with some atypical features including blurred vision worse on the right with some autonomic features.  -As of 5/30/2023: She reports headaches have increased in frequency during pregnancy since around 8 to 10 weeks. She stopped topiramate 100 mg BID when she found out she was pregnant. We discussed how she may have had a subclinical IIH picture that was worsened coming off of topiramate and with pregnancy.   We discussed MRI brain to rule out nefarious causes,  If signs of intracranial hypertension found or if any significant vision changes we discussed adding trial of acetazolamide/Diamox since topiramate would not be safe during pregnancy. Discussed which medications are thought to be safest and agreed that magnesium and riboflavin are helpful for prevention. Discussed I do not recommend Fioricet and to replace it with "Excedrin tension" which is the same without the barbiturate, but that anything as needed would not be the full answer since headaches are daily. She reports very poor sleep which is likely the biggest trigger and follows with sleep medicine for idiopathic hypersomnia. We discussed positionally sleeping on the side may be better or with multiple pillows propped up, as despite sleep studies being negative I still suspect a component of sleep apnea leading to subclinical IIH picture. - as of 6/29/2023: MRI brain reviewed which showed multiple findings consistent with 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 and we discussed I suspect the topiramate 100 mg twice daily was treating this before pregnancy and therefore why she has been so much worse since having to stop it with pregnancy. We discussed I suspect IIH may at times be causing cervical medullary compression type symptoms as well as possibly contributing to her back pain which thankfully is somewhat better with cyclobenzaprine/Flexeril. She is still getting at least 5 headaches a week we discussed trial of acetazolamide/Diamox in the a.m. since she did not tolerate in the p.m. as any dose could diurese some of the suspected increased intracranial pressure. We discussed we will obtain MRI brain with and without contrast to further follow-up the cavernoma after pregnancy at her leisure. CMP in place to monitor potassium.  - as of 10/18/2023: She gave birth 4 weeks ago and has had a whole host of complications and baby is not sleeping well at all unfortunately.   Last night they added rice cereal and she was able to get 3 hours of sleep. She also had postpartum hemorrhage and has had iron infusions in completely feels out of normal, but understands it is due to a variety of factors. Prior to delivery headaches were not daily on acetazolamide/Diamox. After delivery blood pressure significantly increased and therefore ER added back some of the prepregnancy meds including topiramate 100 mg twice daily as well as lisinopril 20 mg.  Headaches are currently daily she feels due to lack of sleep and we discussed certainly could consider acetazolamide/Diamox any point in the future which typically works better to decrease pressure, but since she is already back on topiramate which is typically tolerated better and can be taken less often we will stick with that. MRI brain ordered for follow-up of the cavernoma since she could not have contrast while pregnant and can also evaluate due to increased headaches for nefarious causes. Workup:  -MRI brain without contrast 6/15/2023: 1.  0.5 cm left parietal 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. Although the imaging appearance is typical for  cavernoma, may consider follow-up brain MRI without and with contrast after delivery to complete characterization and confirm stability. 2.  A few foci of T2/FLAIR hyperintensity involving biparietal subcortical white matter.  Finding is nonspecific with broad differential considerations.    -We discussed due to the MRI findings as above and radiology recommending follow-up to confirm stability for the possible cavernoma, we ordered MRI brain with and without contrast     Preventative:  - we discussed headache hygiene and lifestyle factors that may improve headaches  - Currently on through other providers: Fluoxetine/Prozac 20 mg, topiramate/Topamax 100 mg twice daily, lisinopril 20 mg daily (prior was also on buspar for anxiety, amitriptyline 25 mg for IC and wegovy for weight loss, metformin for PCOS)   - Past/ failed/contraindicated: Propranolol would be avoided long-term due to history of asthma although can be used in pregnancy, topiramate 100 mg twice daily stopped January 2023 due to pregnancy, amitriptyline, lisinopril, buspirone/BuSpar, fluoxetine/Prozac, Adderall 40 mg in a.m. prepregnancy, lisinopril  - future options: Acetazolamide/Diamox, cyproheptadine, cyclobenzaprine, amitriptyline, propranolol, verapamil, botox    Abortive:  - discussed not taking over-the-counter or prescription pain medications more than 3 days per week to prevent medication overuse/rebound headache  -     rizatriptan (MAXALT) 10 mg tablet; Take 1 tablet (10 mg total) by mouth once as needed for migraine May repeat in 2 hours if needed. Max 2/24 hours, 9/month. Discussed proper use, possible side effects and risks. - Currently on through other providers: Metoclopramide no to little relief  - Past/ failed/contraindicated: We discussed I do not recommend Fioricet ever, sumatriptan side effects of increased heart rate sweatiness and she is not interested in another triptan  -Past/tolerated: Steroids  - future options post pregnancy:   prochlorperazine, Toradol IM or p.o., could consider trial for 5 days of Depakote or dexamethasone for prolonged migraine, ubrelvy, reyvow, nurtec      Cavernoma  -     MRI brain w wo contrast; Futur      These medications are thought to be compatible with breast-feeding:  Ibuprofen, acetaminophen, triptans  Probably compatible with breast-feeding:  Diphenhydramine/Benadryl, metoclopramide/Reglan, ondansetron /Zofran, if needed steroids, nurtec  prochlorperazine/Compazine  Avoid: Aspirin, DHE/ergot's, lasmiditan/Reyvow    Patient instructions      As we discussed the incidental cavernoma is likely that and we will follow-up with MRI brain with without contrast following birth nonurgently to assess for any changes which are unlikely.     Please follow-up with eye doctor for dilated eye exam and let me know if they see any signs of papilledema behind your eye as this would make me want to change you back from topiramate to acetazolamide/Diamox. Additional Testing:   Neurodiagnostic workup: MRI Brain with contrast will be ordered after pregnancy     Headache/migraine treatment:   Abortive medications (for immediate treatment of a headache): It is ok to take ibuprofen, acetaminophen or naproxen (Advil, Tylenol,  Aleve, Excedrin) if they help your headaches you should limit these to No more than 3 times a week to avoid medication overuse/rebound headaches. For your more moderate to severe migraines take this medication early   Maxalt (rizatriptan) 10mg tabs - take one at the onset of headache. May repeat one time after 2 hours if pain has not resolved. (Max 2 a day and 9 a month)     If you do not tolerate this 1 or if it did not work for her migraines I could then send in a medication called Nurtec or Ubrelvy and these would typically be denied by insurance and then I would send in prior authorization paperwork saying you have tried sumatriptan/Imitrex and rizatriptan/Maxalt and then these would be approved and then if there is a co-pay there is a coupon card to completely cover the co-pay    Try exedrin tension       Prescription preventive medications for headaches/migraines   (to take every day to help prevent headaches - not to take at the time of headache):  [x]     Topiramate 100 mg BID       *Typically these types of medications take time untill you see the benefit, although some may see improvement in days, often it may take weeks, especially if the medication is being titrated up to a beneficial level. Please contact us if there are any concerns or questions regarding the medication. Lifestyle Recommendations:  [x] SLEEP - Maintain a regular sleep schedule: Adults need at least 7-8 hours of uninterrupted a night.  Maintain good sleep hygiene:  Going to bed and waking up at consistent times, avoiding excessive daytime naps, avoiding caffeinated beverages in the evening, avoid excessive stimulation in the evening and generally using bed primarily for sleeping. One hour before bedtime would recommend turning lights down lower, decreasing your activity (may read quietly, listen to music at a low volume). When you get into bed, should eliminate all technology (no texting, emailing, playing with your phone, iPad or tablet in bed). [x] HYDRATION - Maintain good hydration. Drink  2L of fluid a day (4 typical small water bottles)  [x] DIET - Maintain good nutrition. In particular don't skip meals and try and eat healthy balanced meals regularly. [x] TRIGGERS - Look for other triggers and avoid them: Limit caffeine to 1-2 cups a day or less. Avoid dietary triggers that you have noticed bring on your headaches (this could include aged cheese, peanuts, MSG, aspartame and nitrates). [x] EXERCISE - physical exercise as we all know is good for you in many ways, and not only is good for your heart, but also is beneficial for your mental health, cognitive health and  chronic pain/headaches. I would encourage at the least 5 days of physical exercise weekly for at least 30 minutes. Education and Follow-up  [x] Please call with any questions or concerns. Of course if any new concerning symptoms go to the emergency department. [x] Follow up 3 months, sooner if needed      CC: We had the pleasure of evaluating Orville Jacob in neurological consultation today. Orville Jacob is a   right handed female who presents today for evaluation of headaches. History obtained from patient as well as available medical record review.   History of Present Illness:   Interval history as of 10/18/2023  - no significant new or concerning neurologic symptoms since last visit   - gave birth 4 weeks ago, she is not sleeping well, did rice cereal last night and was able to sleep 3 hours     Headaches and migraines Hard to tell what is from what   Post birth hemmorrhage and had iron transfusions, super cold, a lot of blood work, off in general, will take a while to build back up, the tiredness is coming from not sleeping and this     Daily headaches right now with lack of sleep   Prior to delivery was not daily - on diamox   After delivery went back in for high BP and OB sent her to L/D and they added her old meds including topiramate 100 mg BID added back at that point which has helped and the BP pills back too    Preventative:   - acetazolamide - at night was waking up to pee and so 125 mg in the am and tolerated   - Currently on through other providers: Fluoxetine/Prozac 20 mg, topiramate/Topamax 100 mg twice daily, lisinopril 20 mg daily (prior was also on buspar for anxiety, amitriptyline 25 mg for IC and wegovy for weight loss, metformin for PCOS)     Denies bothersome side effects      Sleep study  - after a lot of weight loss fall 2022 - at the place  - both at the place  - no NORMA      Interval history as of 6/29/2023  - no significant new or concerning neurologic symptoms since last visit   -Since last visit she was admitted to [facility] 6/16/2023 for UTI didn't clear with meds, pain worse and IV abs, there 1.5 days   -6/22/2023 follow-up with PT for low back pain - better with the flexeril, uses heat helps, right flank  - Oct 8 in 40 weeks, they are taking her at 45    -MRI brain without contrast 6/15/2023: 1.  0.5 cm left parietal 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. Although the imaging appearance is typical for  cavernoma, may consider follow-up brain MRI without and with contrast after delivery to complete   characterization and confirm stability. 2.  A few foci of T2/FLAIR hyperintensity involving biparietal subcortical white matter. Finding is nonspecific with broad differential considerations.      - usually a stomach sleeper, now on side and sometimes recliner Headaches and migraines   5 a week     Preventative:    - tried diamox for 4 nights and did not help sleep and woke up peeing more, already not getting great sleep   - increased to 400 mg has helped   - Currently on through other providers: We discussed continuing headache preventative supplements and increasing magnesium to 400- 500 mg and riboflavin 400 mg    Abortive:   - cyclobenzaprine 10 mg able to get 2-3 hours at once, got one week ago   - exedrin tension - every 2-3 days   - metoclopramide - doesn't make her sleepy, helps for nausea once in a while   - Currently on through other providers: darline - never took   Denies bothersome side effects        History as of initial visit 5/30/23:   She is currently pregnant and was referred by OB due to history of migraines prior to pregnancy and during pregnancy. She called in this morning stating that she lives far away requesting virtual visit and we discussed it will be harder to do with physical exam virtually which may impact care. 21 weeks PREGNANT, due Oct 8th and plan for end of Sept.     Headaches started at what age? Mild as a kid, High school worse   How often do the headaches occur? - BP pills since age 24 and climbing  - as of 5/30/2023: daily headaches since 8-10 weeks  What time of the day do the headaches start? Worse in am   How long do the headaches last? Worse through the day  Are you ever headache free? Yes    Aura? without aura     Last eye exam: wears glasses, 7 months ago, no new vision changes     Where is your headache located and pain quality?   - back of head towards base of spinal cord, bioccipital region - pressure, stabbing, dull,   Bitemporal  Retro-orbital  Can be bilateral but more often on the right    What is the intensity of pain?  Average: 4-5/10, worst 10+/10  Associated symptoms:   [] Nausea   - more with preg    [] Vomiting         [x] Photophobia     [x]Phonophobia      [] Osmophobia  [x] Blurred vision - once in a while, right eye  [x] Light-headed or dizzy   - from time to time if getting up   [] Tinnitus   [] Hands or feet tingle or feel numb/paresthesias    [] Ptosis      [] Facial droop  [] Lacrimation  [] Nasal congestion/rhinorrhea  - sometimes       Things that make the headache worse? No specific movements    Headache triggers: In the past chocolate caffeine, red wine, Hormonal changes    Have you seen someone else for headaches or pain? Yes, neurology in [facility] around [id number]  Have you had trigger point injection performed and how often? No  Have you had Botox injection performed and how often? No   Have you had epidural injections or transforaminal injections performed? No  Are you current pregnant or planning on getting pregnant? TBD - this was supposed to IUI but then got pregnant on topiramate but then stopped, was thinking another, not TBD  Have you ever had any Brain imaging? yes     What medications do you take or have you taken for your headaches? ABORTIVE:      Metoclopramide 10 mg - no relief     past  Fioricet -butalbital APAP caffeine - didn't start and I took off list   Steroids  imitrex - HR inc, sweaty    PREVENTIVE:     Magnesium 200 mg   Riboflavin 200 mg     Past/ failed/contraindicated:  Propranolol be contraindicated due to her history of asthma  topiramate 100 mg BID - Jan 2023   Amitriptyline   Lisinopril  Buspar/buspirone  Fluoxetine/prosac    Pre pregnancy adderall 40 mg in am, topiramate 100 mg BID, buspar, metformin, lisinopril      LIFESTYLE  Sleep -  History of idiopathic hypersomnia  Previous polysomnogram completed at [facility] on 10/2021 showed AHI of 0.9 events per hour with oxygen eli of 85%. - lost 120 pounds    PSG 11/16/22:  AHI 0.0 SpO2 eli 95%  MSLT 11/17/22: Sleep 5/5 naps; mean sleep latency 5.3 minutes  ESS 14 at [facility]   - averages: very poor, back pain, maybe 3 hours a night, pregnancy pillow on side or back in cocoon, slept much easier       Water: 1-2 gallon per day  Caffeine: 8 oz maybe soda    Mood:   Depression in the past, now overly tired but ok     The following portions of the patient's history were reviewed and updated as appropriate: allergies, current medications, past family history, past medical history, past social history, past surgical history and problem list.    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   Lives with significant other/boyfriend     Past Medical History:   Diagnosis Date    Asthma     Depression     GERD (gastroesophageal reflux disease)     Hypertension     IC (interstitial cystitis)     Migraines     PCOS (polycystic ovarian syndrome)     Pulmonary embolism ([address]) 2009    Venous insufficiency        Past Surgical History:   Procedure Laterality Date    ADENOIDECTOMY      CHOLECYSTECTOMY      LAPAROSCOPY      TONSILLECTOMY         Current Outpatient Medications   Medication Sig Dispense Refill    acetaminophen (TYLENOL) 325 mg tablet Take 2 tablets (650 mg total) by mouth every 6 (six) hours as needed for mild pain or moderate pain  0    albuterol (Ventolin HFA) 90 mcg/act inhaler Inhale 2 puffs every 4 (four) hours as needed for wheezing or shortness of breath 1 Inhaler 1    aspirin-acetaminophen-caffeine (EXCEDRIN MIGRAINE) 250-250-65 MG per tablet Take 1 tablet by mouth every 6 (six) hours as needed for headaches      docusate sodium (COLACE) 250 MG capsule Take 250 mg by mouth daily      enoxaparin (LOVENOX) 40 mg/0.4 mL Inject 0.4 mL (40 mg total) under the skin in the morning for 30 doses 12 mL 3    ferrous sulfate 324 (65 Fe) mg Take 1 tablet (324 mg total) by mouth 2 (two) times a day before meals (Patient taking differently: Take 324 mg by mouth daily before breakfast) 180 tablet 3    FLUoxetine (PROzac) 20 mg capsule Take 1 capsule (20 mg total) by mouth daily 90 capsule 1    fluticasone (FLONASE) 50 mcg/act nasal spray SPRAY 1 SPRAY INTO EACH NOSTRIL EVERY DAY 24 mL 3    ibuprofen (MOTRIN) 200 mg tablet Take 3 tablets (600 mg total) by mouth every 6 (six) hours as needed for moderate pain      lisinopril (ZESTRIL) 20 mg tablet Take 20 mg by mouth daily      loratadine (CLARITIN) 10 mg tablet Take 10 mg by mouth daily      montelukast (SINGULAIR) 10 mg tablet Take 10 mg by mouth daily      rizatriptan (MAXALT) 10 mg tablet Take 1 tablet (10 mg total) by mouth once as needed for migraine May repeat in 2 hours if needed. Max 2/24 hours, 9/month. 9 tablet 12    topiramate (Topamax) 100 mg tablet Take 1 tablet (100 mg total) by mouth 2 (two) times a day 60 tablet 11     No current facility-administered medications for this visit. Allergies   Allergen Reactions    Erythromycin Swelling    Ethylenediamine Dermatitis, Hives, Itching and Swelling    Propolis Dermatitis, Hives, Itching, Swelling and Rash    Sumatriptan Chest Pain and Palpitations     to all TRIPTANS    Triptans Palpitations     flushing         Objective:     Exam limited by Video  Physical Exam:                                                                 Vitals:            Constitutional:    LMP 01/06/2023   BP Readings from Last 3 Encounters:   10/04/23 140/80   09/27/23 141/63   09/27/23 170/100     Pulse Readings from Last 3 Encounters:   10/04/23 74   09/27/23 57   09/27/23 64         Well developed, well nourished, No dysmorphic features. HEENT:  Normocephalic atraumatic. See neuro exam   Psychiatric:  Normal behavior and appropriate affect        Neurological Examination:     Mental status/cognitive function:   Recent and remote memory appear intact. Attention span and concentration as well as fund of knowledge appear appropriate for age. Normal language and spontaneous speech. Cranial Nerves:  VII-facial expression symmetric  Motor Exam: symmetric bulk throughout.  no atrophy, fasciculations or abnormal movements noted. Coordination:  no apparent dysmetria, ataxia or tremor noted     Pertinent lab results:   -3/21/2023 CMP remarkable for glucose 129   CBC unremarkable  TSH normal     Imaging: I have personally reviewed imaging and radiology read   -MRI brain without contrast 6/15/2023: 1.  0.5 cm left parietal 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. Although the imaging appearance is typical for  cavernoma, may consider follow-up brain MRI without and with contrast after delivery to complete characterization and confirm stability. 2.  A few foci of T2/FLAIR hyperintensity involving biparietal subcortical white matter. Finding is nonspecific with broad differential considerations. Review of Systems:   ROS obtained by medical assistant and personally reviewed, but if any symptoms listed below say negative, does not mean patient has not had this symptom since last visit, please see HPI for details of symptoms discussed this visit. I recommended PCP follow up for non neurologic problems. Review of Systems   Constitutional:  Negative for appetite change and fever. HENT: Negative. Negative for hearing loss, tinnitus, trouble swallowing and voice change. Eyes: Negative. Negative for photophobia and pain. Respiratory: Negative. Negative for shortness of breath. Cardiovascular: Negative. Negative for palpitations. Gastrointestinal: Negative. Negative for nausea and vomiting. Endocrine: Negative. Negative for cold intolerance. Genitourinary: Negative. Negative for dysuria, frequency and urgency. Musculoskeletal: Negative. Negative for myalgias and neck pain. Skin: Negative. Negative for rash. Neurological:  Positive for headaches. Negative for dizziness, tremors, seizures, syncope, facial asymmetry, speech difficulty, weakness, light-headedness and numbness. Hematological: Negative.   Does not bruise/bleed easily. Psychiatric/Behavioral:  Positive for sleep disturbance. Negative for confusion and hallucinations. All other systems reviewed and are negative        I have spent 30 minutes with Patient today in which greater than 50% of this time was spent in counseling/coordination of care regarding Diagnostic results, Prognosis, Risks and benefits of tx options, Instructions for management, Importance of tx compliance, Risk factor reductions, Impressions, Counseling / Coordination of care, Documenting in the medical record, Reviewing / ordering tests, medicine, procedures  , and Obtaining or reviewing history  . I also spent 12 minutes non face to face for this patient the same day.            Visit Time  Total Visit Duration: 42

## 2023-10-18 NOTE — PROGRESS NOTES
Review of Systems   Constitutional:  Negative for appetite change and fever. HENT: Negative. Negative for hearing loss, tinnitus, trouble swallowing and voice change. Eyes: Negative. Negative for photophobia and pain. Respiratory: Negative. Negative for shortness of breath. Cardiovascular: Negative. Negative for palpitations. Gastrointestinal: Negative. Negative for nausea and vomiting. Endocrine: Negative. Negative for cold intolerance. Genitourinary: Negative. Negative for dysuria, frequency and urgency. Musculoskeletal: Negative. Negative for myalgias and neck pain. Skin: Negative. Negative for rash. Neurological:  Positive for headaches. Negative for dizziness, tremors, seizures, syncope, facial asymmetry, speech difficulty, weakness, light-headedness and numbness. Hematological: Negative. Does not bruise/bleed easily. Psychiatric/Behavioral:  Positive for sleep disturbance. Negative for confusion and hallucinations. All other systems reviewed and are negative.

## 2023-10-18 NOTE — PATIENT INSTRUCTIONS
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 Please follow-up with eye doctor for dilated eye exam and let me know if they see any signs of papilledema behind your eye as this would make me want to change you back from topiramate to acetazolamide/Diamox. Additional Testing:   Neurodiagnostic workup: MRI Brain with contrast will be ordered after pregnancy     Headache/migraine treatment:   Abortive medications (for immediate treatment of a headache): It is ok to take ibuprofen, acetaminophen or naproxen (Advil, Tylenol,  Aleve, Excedrin) if they help your headaches you should limit these to No more than 3 times a week to avoid medication overuse/rebound headaches. For your more moderate to severe migraines take this medication early   Maxalt (rizatriptan) 10mg tabs - take one at the onset of headache. May repeat one time after 2 hours if pain has not resolved. (Max 2 a day and 9 a month)     If you do not tolerate this 1 or if it did not work for her migraines I could then send in a medication called Nurtec or Ubrelvy and these would typically be denied by insurance and then I would send in prior authorization paperwork saying you have tried sumatriptan/Imitrex and rizatriptan/Maxalt and then these would be approved and then if there is a co-pay there is a coupon card to completely cover the co-pay    Try exedrin tension       Prescription preventive medications for headaches/migraines   (to take every day to help prevent headaches - not to take at the time of headache):  [x]     Topiramate 100 mg BID       *Typically these types of medications take time untill you see the benefit, although some may see improvement in days, often it may take weeks, especially if the medication is being titrated up to a beneficial level.  Please contact us if there are any concerns or questions regarding the medication. Lifestyle Recommendations:  [x] SLEEP - Maintain a regular sleep schedule: Adults need at least 7-8 hours of uninterrupted a night. Maintain good sleep hygiene:  Going to bed and waking up at consistent times, avoiding excessive daytime naps, avoiding caffeinated beverages in the evening, avoid excessive stimulation in the evening and generally using bed primarily for sleeping. One hour before bedtime would recommend turning lights down lower, decreasing your activity (may read quietly, listen to music at a low volume). When you get into bed, should eliminate all technology (no texting, emailing, playing with your phone, iPad or tablet in bed). [x] HYDRATION - Maintain good hydration. Drink  2L of fluid a day (4 typical small water bottles)  [x] DIET - Maintain good nutrition. In particular don't skip meals and try and eat healthy balanced meals regularly. [x] TRIGGERS - Look for other triggers and avoid them: Limit caffeine to 1-2 cups a day or less. Avoid dietary triggers that you have noticed bring on your headaches (this could include aged cheese, peanuts, MSG, aspartame and nitrates). [x] EXERCISE - physical exercise as we all know is good for you in many ways, and not only is good for your heart, but also is beneficial for your mental health, cognitive health and  chronic pain/headaches. I would encourage at the least 5 days of physical exercise weekly for at least 30 minutes. Education and Follow-up  [x] Please call with any questions or concerns. Of course if any new concerning symptoms go to the emergency department.   [x] Follow up 3 months, sooner if needed

## 2023-10-18 NOTE — PROGRESS NOTES
"[address]: Ms Norman Burton was seen today for nuchal translucency ultrasound  See ultrasound report under \"OB Procedures\" tab  Review of Systems   Constitutional: Negative for chills, fever and unexpected weight change  HENT: Negative for congestion, dental problem, facial swelling and sore throat  Eyes: Negative for visual disturbance  Respiratory: Negative for cough and shortness of breath  Cardiovascular: Negative for chest pain and palpitations  Gastrointestinal: Negative for diarrhea and vomiting  Endocrine: Positive for polydipsia  Genitourinary: Negative for dysuria and vaginal bleeding  Musculoskeletal: Positive for back pain  Negative for joint swelling  Skin: Positive for rash  Negative for wound  Allergic/Immunologic: Negative for immunocompromised state  Neurological: Positive for headaches  Negative for seizures  Hematological: Does not bruise/bleed easily  Psychiatric/Behavioral: Negative for hallucinations and suicidal ideas  Physical Exam  Constitutional:       General: She is not in acute distress  Appearance: Normal appearance  She is not ill-appearing, toxic-appearing or diaphoretic  HENT:      Head: Normocephalic and atraumatic  Nose: No congestion or rhinorrhea  Eyes:      General: No scleral icterus  Right eye: No discharge  Left eye: No discharge  Extraocular Movements: Extraocular movements intact  Conjunctiva/sclera: Conjunctivae normal    Pulmonary:      Effort: Pulmonary effort is normal  No respiratory distress  Musculoskeletal:      Cervical back: Normal range of motion  Skin:     Coloration: Skin is not jaundiced or pale  Findings: No erythema, lesion or rash  Neurological:      General: No focal deficit present  Mental Status: She is alert and oriented to person, place, and time     Psychiatric:         Mood and Affect: Mood normal          Behavior: Behavior normal  " ----- Message from Jena Green MA sent at 10/18/2023  9:19 AM CDT -----  Contact: self  Spoke to pt and he informed me that he wants someone to check his pacemaker bc he lifted his arms a few times on accident and wants to make sure his pacemaker is ok.   ----- Message -----  From: Karen Dang  Sent: 10/18/2023   9:09 AM CDT  To: [facility] OSULLIVAN Staff    Type:  Needs Medical Advice    Who Called: Pt   Symptoms (please be specific):  Pt has some questions for med team...    Best Call Back Number:  [phone]  Additional Information:  Pt would like a call from the ofc staff...   Thank you...            MDM:   I  Diagnoses/Problems addressed: back pain, migraines, AMA, chronic hypertension, history of VTE  II  Data: I reviewed thrombophilia lab tests ordered by another provider  and prenatal notes  III    Risk of morbidity: low    Please don't hesitate to contact our office with any concerns or questions   -Crystal Banks MD

## 2023-10-19 ENCOUNTER — TELEPHONE (OUTPATIENT)
Dept: OBGYN CLINIC | Facility: CLINIC | Age: 38
End: 2023-10-19

## 2023-10-19 NOTE — TELEPHONE ENCOUNTER
Pt called and left a voicemail stating she had completed her course of Keflex and she is still having frequency and burning but now has developed a sharp pain in her back, primarily on her right side, and it comes and goes. Pain has been occurring off and on over past few days. Pt unsure if she should f/u with us or PCP?

## 2023-11-08 ENCOUNTER — HOSPITAL ENCOUNTER (OUTPATIENT)
Dept: MRI IMAGING | Facility: HOSPITAL | Age: 38
Discharge: HOME | End: 2023-11-08
Attending: PSYCHIATRY & NEUROLOGY
Payer: COMMERCIAL

## 2023-11-08 ENCOUNTER — HOSPITAL ENCOUNTER (OUTPATIENT)
Dept: RADIOLOGY | Facility: CLINIC | Age: 38
Discharge: HOME | End: 2023-11-08
Payer: COMMERCIAL

## 2023-11-08 DIAGNOSIS — G43.709 CHRONIC MIGRAINE WITHOUT AURA WITHOUT STATUS MIGRAINOSUS, NOT INTRACTABLE: ICD-10-CM

## 2023-11-08 DIAGNOSIS — D18.00 CAVERNOMA: ICD-10-CM

## 2023-11-08 DIAGNOSIS — N63.24 BREAST LUMP ON LEFT SIDE AT 7 O'CLOCK POSITION: ICD-10-CM

## 2023-11-08 PROCEDURE — G1004 CDSM NDSC: HCPCS

## 2023-11-08 PROCEDURE — 70553 MRI BRAIN STEM W/O & W/DYE: CPT

## 2023-11-08 PROCEDURE — 76642 ULTRASOUND BREAST LIMITED: CPT

## 2023-11-08 PROCEDURE — A9585 GADOBUTROL INJECTION: HCPCS | Performed by: PSYCHIATRY & NEUROLOGY

## 2023-11-08 RX ORDER — GADOBUTROL 604.72 MG/ML
11 INJECTION INTRAVENOUS
Status: COMPLETED | OUTPATIENT
Start: 2023-11-08 | End: 2023-11-08

## 2023-11-08 RX ADMIN — GADOBUTROL 11 ML: 604.72 INJECTION INTRAVENOUS at 13:13

## 2023-11-10 NOTE — RESULT ENCOUNTER NOTE
Lisa Trevizo,    Dr. Rudy Zurita is not in the office today but I wanted to let you know that your MRI came back and looked fine. 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 Please let us know how you are doing on your current treatment or call back/send a message with any questions or concerns.   Thanks a lot!    -Dr Sarah Guthrie

## 2023-11-16 ENCOUNTER — POSTPARTUM VISIT (OUTPATIENT)
Dept: OBGYN CLINIC | Facility: CLINIC | Age: 38
End: 2023-11-16

## 2023-11-16 VITALS
BODY MASS INDEX: 34.58 KG/M2 | HEART RATE: 52 BPM | WEIGHT: 247 LBS | HEIGHT: 71 IN | DIASTOLIC BLOOD PRESSURE: 82 MMHG | SYSTOLIC BLOOD PRESSURE: 118 MMHG

## 2023-11-16 DIAGNOSIS — Z30.09 COUNSELING FOR BIRTH CONTROL REGARDING INTRAUTERINE DEVICE (IUD): ICD-10-CM

## 2023-11-16 PROCEDURE — 99024 POSTOP FOLLOW-UP VISIT: CPT | Performed by: OBSTETRICS & GYNECOLOGY

## 2023-11-16 NOTE — PROGRESS NOTES
Subjective       Chief Complaint   Patient presents with    Postpartum Care     PPD score 4       Relda Bracket is a 43 y.o. female who presents for a postpartum visit. She is 8 weeks postpartum following a spontaneous vaginal delivery. I have fully reviewed the prenatal and intrapartum course. The delivery was at 37.3 gestational weeks. Outcome: spontaneous vaginal delivery. Anesthesia: epidural. Postpartum course has been normal. Baby's course has been normal. Baby is feeding by bottle  Bleeding  spotting occasional . Bowel function is normal. Bladder function is normal. Patient is sexually active. Contraception method is none but wants MIRENA. Postpartum depression screening: negative. The following portions of the patient's history were reviewed and updated as appropriate: allergies, current medications, past family history, past medical history, past social history, past surgical history, and problem list.    Postpartum Depression: High Risk (10/4/2023)    [place]  Depression Scale     Last EPDS Total Score: 11     Last EPDS Self Harm Result: Never       Last PAP: 22  GDM:  no      Review of Systems   Constitutional: Negative. HENT: Negative. Eyes: Negative. Respiratory: Negative. Cardiovascular: Negative. Gastrointestinal: Negative. Endocrine: Negative. Genitourinary:         As noted in HPI   Musculoskeletal: Negative. Skin: Negative. Allergic/Immunologic: Negative. Neurological: Negative. Hematological: Negative. Psychiatric/Behavioral: Negative.            Objective     /82 (BP Location: Right arm, Patient Position: Sitting, Cuff Size: Adult)   Pulse (!) 52   Ht 5' 11" (1.803 m)   Wt 112 kg (247 lb)   LMP 2023   Breastfeeding Unknown   BMI 34.45 kg/m²    General:  alert and oriented, in no acute distress   Abdomen: soft, non-tender; bowel sounds normal; no masses,  no organomegaly    Vulva:  normal   Vagina: not evaluated Cervix:  no cervical motion tenderness   Corpus: normal size, contour, position, consistency, mobility, non-tender   Adnexa:  no mass, fullness, tenderness       Assessment/Plan     8 weekls postpartum exam      1. Contraception: IUD MIRENA  3. Follow up in: 2 weeks for IUD insertion or as needed.       Problem List Items Addressed This Visit    None

## 2023-11-20 ENCOUNTER — TELEPHONE (OUTPATIENT)
Dept: NEUROLOGY | Facility: CLINIC | Age: 38
End: 2023-11-20

## 2023-11-20 NOTE — TELEPHONE ENCOUNTER
11/20/23-Called and left voicemail for patient to inform them that unfortunately Dr. Leena Esquivel will be out of the office on the day of their scheduled appointment and we have to reschedule their appointment. I left the office number for them to call back so we can get them rescheduled. 11/15/23-Called and left voicemail for patient to inform them that unfortunately Dr. Leena Esquivel will be out of the office on the day of their scheduled appointment and we have to reschedule their appointment. I left the office number for them to call back so we can get them rescheduled.

## 2023-11-24 NOTE — TELEPHONE ENCOUNTER
Pt called in to r/s appt. Mulugeta Avery was there a specific date we were going to offer pt? Please assist or I can call back and offer first available. Thank you.

## 2023-11-28 PROBLEM — O99.810 IMPAIRED GLUCOSE TOLERANCE DURING PREGNANCY: Status: RESOLVED | Noted: 2023-07-10 | Resolved: 2023-11-28

## 2023-11-29 ENCOUNTER — PROCEDURE VISIT (OUTPATIENT)
Dept: OBGYN CLINIC | Facility: CLINIC | Age: 38
End: 2023-11-29
Payer: COMMERCIAL

## 2023-11-29 VITALS
DIASTOLIC BLOOD PRESSURE: 60 MMHG | TEMPERATURE: 96.9 F | SYSTOLIC BLOOD PRESSURE: 98 MMHG | HEART RATE: 92 BPM | WEIGHT: 249 LBS | HEIGHT: 71 IN | BODY MASS INDEX: 34.86 KG/M2

## 2023-11-29 DIAGNOSIS — Z30.09 COUNSELING FOR BIRTH CONTROL REGARDING INTRAUTERINE DEVICE (IUD): ICD-10-CM

## 2023-11-29 DIAGNOSIS — N76.0 RECURRENT VAGINITIS: ICD-10-CM

## 2023-11-29 DIAGNOSIS — N89.8 VAGINAL IRRITATION: ICD-10-CM

## 2023-11-29 DIAGNOSIS — Z30.430 ENCOUNTER FOR INSERTION OF MIRENA IUD: Primary | ICD-10-CM

## 2023-11-29 LAB
BV WHIFF TEST VAG QL: NORMAL
CLUE CELLS SPEC QL WET PREP: NORMAL
PH SMN: 4.5 [PH]
SL AMB POCT URINE HCG: NEGATIVE
SL AMB POCT WET MOUNT: NORMAL
T VAGINALIS VAG QL WET PREP: NORMAL
YEAST VAG QL WET PREP: NORMAL

## 2023-11-29 PROCEDURE — 87510 GARDNER VAG DNA DIR PROBE: CPT | Performed by: OBSTETRICS & GYNECOLOGY

## 2023-11-29 PROCEDURE — 87210 SMEAR WET MOUNT SALINE/INK: CPT | Performed by: OBSTETRICS & GYNECOLOGY

## 2023-11-29 PROCEDURE — 81025 URINE PREGNANCY TEST: CPT | Performed by: OBSTETRICS & GYNECOLOGY

## 2023-11-29 PROCEDURE — 99214 OFFICE O/P EST MOD 30 MIN: CPT | Performed by: OBSTETRICS & GYNECOLOGY

## 2023-11-29 PROCEDURE — 58300 INSERT INTRAUTERINE DEVICE: CPT | Performed by: OBSTETRICS & GYNECOLOGY

## 2023-11-29 PROCEDURE — 87480 CANDIDA DNA DIR PROBE: CPT | Performed by: OBSTETRICS & GYNECOLOGY

## 2023-11-29 PROCEDURE — 87660 TRICHOMONAS VAGIN DIR PROBE: CPT | Performed by: OBSTETRICS & GYNECOLOGY

## 2023-11-29 RX ORDER — SEMAGLUTIDE 0.68 MG/ML
0.25 INJECTION, SOLUTION SUBCUTANEOUS WEEKLY
COMMUNITY
Start: 2023-11-17

## 2023-11-29 NOTE — PROGRESS NOTES
Iud insertions    Date/Time: 11/29/2023 1:45 PM    Performed by: Jay Olvera MD  Authorized by: Jay Olvera MD    Verbal consent obtained?: Yes    Written consent obtained?: Yes    Procedure consent matches procedure scheduled: Yes    Relevant documents present and verified: Yes    Test results available and properly labeled: Yes    Patient identity confirmed:  Verbally with patient  Procedure:     Pelvic exam performed: yes      Negative urine pregnancy test: yes      Cervix cleaned and prepped: yes      Allis applied to cervix: yes      Uterus sounded: yes      Uterus sound depth (cm):  8    IUD type:  Mirena    Strings trimmed: yes    Post-procedure:     Patient tolerated procedure well: yes      Patient will follow up after next period: yes

## 2023-11-29 NOTE — PROGRESS NOTES
Assessment/Plan:  Problem List Items Addressed This Visit    None  Visit Diagnoses       Encounter for insertion of mirena IUD    -  Primary    Relevant Medications    levonorgestrel (MIRENA) IUD 20 mcg/day (Completed) (Start on 11/29/2023  2:15 PM)    Other Relevant Orders    POCT urine HCG    Counseling for birth control regarding intrauterine device (IUD)        Relevant Medications    levonorgestrel (MIRENA) IUD 20 mcg/day (Completed) (Start on 11/29/2023  2:15 PM)    Other Relevant Orders    POCT urine HCG    Vaginal irritation        Relevant Orders    POCT wet mount    Vaginosis DNA Probe    Recurrent vaginitis        Relevant Orders    POCT wet mount    Vaginosis DNA Probe           She requests Mirena IUD    Mirena Intrauterine device was discussed with patient. Discussed with patient failure rate of less than 1%. Discussed with patient increased risk of ectopic pregnancy should a pregnancy occur. Discussed duration of use up to 8 years for birth control and 5 years for treatment of heavy bleeding. Discussed the Intrauterine device is highly effective, long-acting and rapidly reversible with few side effects. This should not interfere with sexual activity. Hormonal side effects can including hormonal changes, weight gain, bloating, AUB, amenorrhea, increased risk of vaginitis, ovarian cyst formation. Discuss other potential complications such as expulsion of about 3 to 10%. Discussed other potential complications such as mechanical breakdown, uterine perforation, malposition,  pelvic or genital infection, non-visualized strings, difficulty removal.      After insertion, patient should return if she has any fever, worsening pelvic pain, unusually heavy bleeding, suspected expulsion, foul-smelling vaginal discharge. Routine self Intrauterine device string checks are safe but not required.       Follow-up is required after several weeks, usually after the 1st  menstrual cycle evaluate her satisfaction with the IUD and address any problems concerns or side effects. Advised avoidance of intercourse up to 1 week after insertion. Wet mount is negative for infection. Affirm vaginosis probe was sent to check for vaginal infection. We will treat accordingly. She is aware slightly increased risk of vaginitis with Mirena IUD. Follow-up in 6 weeks for IUD check    Subjective   Patient ID: Noel Dawn is a 43 y.o. female. Patient is here for a problem visit. Chief Complaint   Patient presents with    Procedure     Mirena IUD insertion    Vaginal Discharge     Pt has discharge, slight pressure and burning     Patient is here for IUD insertion. Patient previously had Mirena IUD. She is 10 weeks postpartum. She is not breast-feeding. She does report having some vaginal burning some pressure and irritation. She wants to be checked for bacterial vaginosis. Menstrual History:  OB History          2    Para   1    Term   1       0    AB   0    Living   1         SAB   0    IAB   0    Ectopic   0    Multiple   0    Live Births   1                Patient's last menstrual period was 2023.          Past Medical History:   Diagnosis Date    Asthma     Depression     GERD (gastroesophageal reflux disease)     Hypertension     IC (interstitial cystitis)     Migraines     PCOS (polycystic ovarian syndrome)     Pulmonary embolism ([address])     Venous insufficiency        Past Surgical History:   Procedure Laterality Date    ADENOIDECTOMY      CHOLECYSTECTOMY      LAPAROSCOPY      TONSILLECTOMY         Social History     Tobacco Use    Smoking status: Never    Smokeless tobacco: Never   Vaping Use    Vaping Use: Never used   Substance Use Topics    Alcohol use: Not Currently    Drug use: Never        Allergies   Allergen Reactions    Erythromycin Swelling    Ethylenediamine Dermatitis, Hives, Itching and Swelling    Propolis Dermatitis, Hives, Itching, Swelling and Rash Sumatriptan Chest Pain and Palpitations     to all TRIPTANS    Triptans Palpitations     flushing         Current Outpatient Medications:     FLUoxetine (PROzac) 20 mg capsule, Take 1 capsule (20 mg total) by mouth daily, Disp: 90 capsule, Rfl: 1    ibuprofen (MOTRIN) 200 mg tablet, Take 3 tablets (600 mg total) by mouth every 6 (six) hours as needed for moderate pain, Disp: , Rfl:     lisinopril (ZESTRIL) 20 mg tablet, Take 20 mg by mouth daily, Disp: , Rfl:     metFORMIN (GLUCOPHAGE) 1000 MG tablet, Take 1,000 mg by mouth daily with breakfast, Disp: , Rfl:     montelukast (SINGULAIR) 10 mg tablet, Take 10 mg by mouth daily, Disp: , Rfl:     Ozempic, 0.25 or 0.5 MG/DOSE, 2 MG/3ML injection pen, Inject 0.25 mg under the skin Once a week, Disp: , Rfl:     rizatriptan (MAXALT) 10 mg tablet, Take 1 tablet (10 mg total) by mouth once as needed for migraine May repeat in 2 hours if needed. Max 2/24 hours, 9/month., Disp: 9 tablet, Rfl: 12    topiramate (TOPAMAX) 100 mg tablet, TAKE 1 TABLET BY MOUTH TWICE A DAY, Disp: 180 tablet, Rfl: 4  No current facility-administered medications for this visit. Review of Systems   Constitutional: Negative. HENT: Negative. Eyes: Negative. Respiratory: Negative. Cardiovascular: Negative. Gastrointestinal: Negative. Endocrine: Negative. Genitourinary:         As noted in HPI   Musculoskeletal: Negative. Skin: Negative. Allergic/Immunologic: Negative. Neurological: Negative. Hematological: Negative. Psychiatric/Behavioral: Negative. BP 98/60 (BP Location: Left arm, Patient Position: Sitting, Cuff Size: Adult)   Pulse 92   Temp (!) 96.9 °F (36.1 °C) (Tympanic)   Ht 5' 11" (1.803 m)   Wt 113 kg (249 lb)   LMP 01/06/2023 Comment: No consistent menstural cycle since delivery  Breastfeeding Unknown   BMI 34.73 kg/m²       Physical Exam  Constitutional:       General: She is not in acute distress. Appearance: She is well-developed. Genitourinary:      Bladder and urethral meatus normal.      No lesions in the vagina. Right Labia: No rash, tenderness, lesions, skin changes or Bartholin's cyst.     Left Labia: No tenderness, lesions, skin changes, Bartholin's cyst or rash. No vaginal discharge, erythema, tenderness or bleeding. No vaginal prolapse present. No vaginal atrophy present. Right Adnexa: not tender, not full and no mass present. Left Adnexa: not tender, not full and no mass present. No cervical polyp. Uterus is not enlarged or tender. No uterine mass detected. Pelvic exam was performed with patient in the lithotomy position. Rectum:      No tenderness. Cardiovascular:      Rate and Rhythm: Normal rate. Pulmonary:      Effort: Pulmonary effort is normal.   Abdominal:      General: There is no distension. Palpations: Abdomen is soft. Tenderness: There is no abdominal tenderness. Neurological:      Mental Status: She is alert and oriented to person, place, and time. Skin:     General: Skin is warm and dry.    Psychiatric:         Behavior: Behavior normal.           Future Appointments   Date Time Provider [address] [place] [address]   1/15/2024 12:30 PM Jair Dover MD [facility]   5/15/2024  9:30 AM [facility] 1 [facility]   5/15/2024 10:00 AM [facility] 1 [facility] US [facility]

## 2023-11-30 LAB
CANDIDA RRNA VAG QL PROBE: NOT DETECTED
G VAGINALIS RRNA GENITAL QL PROBE: NOT DETECTED
T VAGINALIS RRNA GENITAL QL PROBE: NOT DETECTED

## 2024-01-08 RX ORDER — FLUOXETINE HYDROCHLORIDE 20 MG/1
20 CAPSULE ORAL DAILY
Qty: 90 CAPSULE | Refills: 5 | Status: SHIPPED | OUTPATIENT
Start: 2024-01-08

## 2024-01-09 ENCOUNTER — TELEPHONE (OUTPATIENT)
Dept: NEUROLOGY | Facility: CLINIC | Age: 39
End: 2024-01-09

## 2024-01-09 NOTE — TELEPHONE ENCOUNTER
Called patient and I left a message to call back to confirm her 01/15/24 virtual video visit with Dr Nidhi Lowery

## 2024-03-23 NOTE — PROGRESS NOTES
OB/GYN  PN Visit  [facility]  [id number]  7/17/2023  9:39 AM  Dr. Philipp Medina MD    S: 43 y.o. Arabella Rankin 28w1d here for PN visit. Chief Complaint   Patient presents with   • Routine Prenatal Visit     Pt with c/o urinary frequency and burning. Started wed/thurs of last week. Pt with history of UTI sx. She states flexeril helps, she has some urinary frequency and back pain. On Suppression, on KELFEX daily for recurrent UTI      OB complaints:  Contractions: no  Leakage: no  Bleeding: no  Fetal movement: yes      O:  /70 (BP Location: Right arm, Cuff Size: Large)   Pulse 75   Temp 97.8 °F (36.6 °C) (Tympanic)   Wt 115 kg (253 lb 12.8 oz)   LMP 01/06/2023   BMI 35.40 kg/m²       Review of Systems   Constitutional: Negative. HENT: Negative. Eyes: Negative. Respiratory: Negative. Cardiovascular: Negative. Gastrointestinal: Negative. Endocrine: Negative. Genitourinary:        As noted in HPI   Musculoskeletal: Negative. Skin: Negative. Allergic/Immunologic: Negative. Neurological: Negative. Hematological: Negative. Psychiatric/Behavioral: Negative. Physical Exam  Constitutional:       General: She is not in acute distress. Appearance: Normal appearance. Genitourinary:      Right Labia: No rash, tenderness, lesions, skin changes or Bartholin's cyst.     Left Labia: No tenderness, lesions, skin changes, Bartholin's cyst or rash. No labial fusion noted. No inguinal adenopathy present in the right or left side. Pelvic exam was performed with patient in the lithotomy position. HENT:      Head: Normocephalic. Cardiovascular:      Rate and Rhythm: Normal rate. Pulmonary:      Effort: Pulmonary effort is normal.   Abdominal:      General: There is no distension. Palpations: Abdomen is soft. Tenderness: There is no abdominal tenderness. There is no guarding.       Hernia: There is no hernia in the left inguinal area or right inguinal area. Musculoskeletal:         General: No swelling or deformity. Lymphadenopathy:      Lower Body: No right inguinal adenopathy. No left inguinal adenopathy. Neurological:      General: No focal deficit present. Mental Status: She is alert and oriented to person, place, and time. Skin:     General: Skin is warm and dry. Psychiatric:         Mood and Affect: Mood normal.         Behavior: Behavior normal.   Vitals reviewed. Pregravid Weight/BMI: 91.6 kg (202 lb) (BMI 28.19)  Current Weight: 115 kg (253 lb 12.8 oz)   Total Weight Gain: 23.5 kg (51 lb 12.8 oz)   Pre- Vitals    Flowsheet Row Most Recent Value   Prenatal Assessment    Fetal Heart Rate 140   Fundal Height (cm) 28 cm   Movement Present   Prenatal Vitals    Blood Pressure 136/70   Weight - Scale 115 kg (253 lb 12.8 oz)   Urine Albumin/Glucose    Dilation/Effacement/Station    Vaginal Drainage    Edema            Problem List        Digestive    GERD (gastroesophageal reflux disease)       Endocrine    PCOS (polycystic ovarian syndrome)    Impaired glucose tolerance during pregnancy    Overview     glucola elevated at 27 weeks - 3 hr GTT ordered            Respiratory    Mild intermittent asthma without complication    Overview     Inhalers PRN         Asthma    Overview     Inhalers PRN            Cardiovascular and Mediastinum    Chronic hypertension affecting pregnancy    Overview     No meds currently   Was previously on lisinopril and metoprolol - after weight loss no metoprolol   Stopped lisinopril 2023   Baseline labs normal, UPC undetectable   Aspirin recommended         Migraine       Genitourinary    Interstitial cystitis       Other    History of pulmonary embolism    Overview     This was unprovoked in the setting of oral contraceptive use.   She had a thorough thrombophilia work-up which can be seen in her lab reports from 2022 which was negative for inherited or acquired thrombophilia. She is recommended to continue Lovenox prophylaxis throughout her pregnancy and 6 weeks postpartum         Family history of breast cancer    Overview     PGM @ 78         Mass of lower inner quadrant of left breast    Overview      US 3/2023: Previously noted area of probable fat necrosis in the 7 o'clock position of the left breast, 5 cm from the nipple has significantly decreased in size currently measuring 5 x 4 x 7 mm. 6 month f/u scheduled 10/4/23         Supervision of high-risk pregnancy    Overview     ASA recommended  Flu vaccine:  completed  Covid vaccine:  completed X 5  NIPS/ MsAFP- negative           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    Overview     Patient is a carrier for CF, alpha thalassemia, Alveda Holiday - had carrier screening with [facility]  Partner testing:  pending         28 weeks gestation of pregnancy    Depression affecting pregnancy    Overview     Stopped topomax, buspar, prozac with + pregnancy  Behavioral health referral sent         Medication exposure during first trimester of pregnancy    Overview     Stopped topomax with + pregnancy test         Obesity affecting pregnancy    Overview     Pre gestational BMI 30  Early glucola: 3/21 117         Group B streptococcal bacteriuria    Overview     + GBS in urine @ 14 weeks:    Will need to treat in labor         Primigravida of advanced maternal age in third trimester    Excessive weight gain during pregnancy in third trimester    Overview     49 lbs as of 25 weeks         Other Visit Diagnoses     Third trimester pregnancy    -  Primary    Need for Tdap vaccination        UTI symptoms             Urine culture and urinalhysis sent  TDAP given    Follow-up in 2 weeks  Growth scan today    Delivery paperwork given  Consent to procedures spontaneous assisted vaginal delivery or operative vaginal delivery or  birth - signed  Visitation policy reviewed  Birth certificate information mother's work sheet  Birth plan guide   Authorization for release of protected health information for photographers  Birthing room support person rules and acknowledgement    Education  Perineal massage  Kick counts  Last weeks of pregnancy and when to notify the doctor        Future Appointments   Date Time Provider [address]  [address]   2023 11:30 AM  US 2  [place]   2023  4:15 PM Aaliyah Check, PT [place] [facility]   2023  9:00 AM Dale Chávez MD [facility]   8/3/2023  9:45 AM Sathya Schilling MD [facility]   2023  9:00 AM Lia Vergara MD [facility]   2023  9:00 AM Lia Vergara MD [facility]   10/4/2023  9:00 AM OW US MOB 1 OW MOB US OW MOB               Sathya Schilling MD  2023  9:39 AM cool-mist humidifier as needed for congestion.        I am the Primary Clinician of Record.    FINAL IMPRESSION      1. Upper respiratory tract infection, unspecified type          DISPOSITION/PLAN     DISPOSITION Decision To Discharge 03/23/2024 08:17:30 PM      PATIENT REFERRED TO:  Thiago Vasquez MD  [address] [place] [address] [place] [address]  [phone]    Schedule an appointment as soon as possible for a visit in 2 days  for follow up      DISCHARGE MEDICATIONS:  Discharge Medication List as of 3/23/2024  8:17 PM          DISCONTINUED MEDICATIONS:  Discharge Medication List as of 3/23/2024  8:17 PM                 (Please note that portions of this note were completed with a voice recognition program.  Efforts were made to edit the dictations but occasionally words are mis-transcribed.)    VANESSA Claros - CNP (electronically signed)         Jayleen Salgado, VANESSA Dinh CNP  03/23/24 2052

## 2024-04-30 ENCOUNTER — TELEPHONE (OUTPATIENT)
Dept: MAMMOGRAPHY | Facility: CLINIC | Age: 39
End: 2024-04-30

## 2024-11-13 ENCOUNTER — HOSPITAL ENCOUNTER (OUTPATIENT)
Dept: RADIOLOGY | Facility: CLINIC | Age: 39
Discharge: HOME | End: 2024-11-13
Payer: COMMERCIAL

## 2024-11-13 VITALS — HEIGHT: 71 IN | BODY MASS INDEX: 30.8 KG/M2 | WEIGHT: 220 LBS

## 2024-11-13 DIAGNOSIS — R92.8 ABNORMAL MAMMOGRAM: ICD-10-CM

## 2024-11-13 PROCEDURE — G0279 TOMOSYNTHESIS, MAMMO: HCPCS

## 2024-11-13 PROCEDURE — 76642 ULTRASOUND BREAST LIMITED: CPT

## 2024-11-13 PROCEDURE — 77066 DX MAMMO INCL CAD BI: CPT

## 2025-01-24 ENCOUNTER — APPOINTMENT (EMERGENCY)
Dept: CT IMAGING | Facility: HOSPITAL | Age: 40
End: 2025-01-24
Payer: COMMERCIAL

## 2025-01-24 ENCOUNTER — HOSPITAL ENCOUNTER (EMERGENCY)
Facility: HOSPITAL | Age: 40
Discharge: HOME | End: 2025-01-24
Attending: EMERGENCY MEDICINE | Admitting: EMERGENCY MEDICINE
Payer: COMMERCIAL

## 2025-01-24 VITALS
SYSTOLIC BLOOD PRESSURE: 105 MMHG | HEART RATE: 90 BPM | WEIGHT: 220 LBS | TEMPERATURE: 97.8 F | OXYGEN SATURATION: 99 % | BODY MASS INDEX: 30.8 KG/M2 | RESPIRATION RATE: 18 BRPM | DIASTOLIC BLOOD PRESSURE: 79 MMHG | HEIGHT: 71 IN

## 2025-01-24 DIAGNOSIS — R10.9 FLANK PAIN: Primary | ICD-10-CM

## 2025-01-24 DIAGNOSIS — N20.0 KIDNEY STONE: ICD-10-CM

## 2025-01-24 LAB
ALBUMIN SERPL BCG-MCNC: 4.5 G/DL (ref 3.5–5)
ALP SERPL-CCNC: 64 U/L (ref 34–104)
ALT SERPL W P-5'-P-CCNC: 16 U/L (ref 7–52)
ANION GAP SERPL CALCULATED.3IONS-SCNC: 6 MMOL/L (ref 4–13)
AST SERPL W P-5'-P-CCNC: 14 U/L (ref 13–39)
B-HCG SERPL-ACNC: 0.8 MIU/ML (ref 0–5)
BASOPHILS # BLD AUTO: 0.03 THOUSANDS/ΜL (ref 0–0.1)
BASOPHILS NFR BLD AUTO: 1 % (ref 0–1)
BILIRUB SERPL-MCNC: 0.29 MG/DL (ref 0.2–1)
BILIRUB UR QL STRIP: NEGATIVE
BUN SERPL-MCNC: 12 MG/DL (ref 5–25)
CALCIUM SERPL-MCNC: 9.4 MG/DL (ref 8.4–10.2)
CHLORIDE SERPL-SCNC: 108 MMOL/L (ref 96–108)
CLARITY UR: CLEAR
CO2 SERPL-SCNC: 24 MMOL/L (ref 21–32)
COLOR UR: YELLOW
CREAT SERPL-MCNC: 0.89 MG/DL (ref 0.6–1.3)
EOSINOPHIL # BLD AUTO: 0.13 THOUSAND/ΜL (ref 0–0.61)
EOSINOPHIL NFR BLD AUTO: 2 % (ref 0–6)
ERYTHROCYTE [DISTWIDTH] IN BLOOD BY AUTOMATED COUNT: 13.7 % (ref 11.6–15.1)
FLUAV AG UPPER RESP QL IA.RAPID: NEGATIVE
FLUBV AG UPPER RESP QL IA.RAPID: NEGATIVE
GFR SERPL CREATININE-BSD FRML MDRD: 81 ML/MIN/1.73SQ M
GLUCOSE SERPL-MCNC: 77 MG/DL (ref 65–140)
GLUCOSE UR STRIP-MCNC: NEGATIVE MG/DL
HCT VFR BLD AUTO: 45 % (ref 34.8–46.1)
HGB BLD-MCNC: 14.7 G/DL (ref 11.5–15.4)
HGB UR QL STRIP.AUTO: NEGATIVE
IMM GRANULOCYTES # BLD AUTO: 0.01 THOUSAND/UL (ref 0–0.2)
IMM GRANULOCYTES NFR BLD AUTO: 0 % (ref 0–2)
KETONES UR STRIP-MCNC: NEGATIVE MG/DL
LEUKOCYTE ESTERASE UR QL STRIP: NEGATIVE
LIPASE SERPL-CCNC: 37 U/L (ref 11–82)
LYMPHOCYTES # BLD AUTO: 2.61 THOUSANDS/ΜL (ref 0.6–4.47)
LYMPHOCYTES NFR BLD AUTO: 43 % (ref 14–44)
MCH RBC QN AUTO: 26.5 PG (ref 26.8–34.3)
MCHC RBC AUTO-ENTMCNC: 32.7 G/DL (ref 31.4–37.4)
MCV RBC AUTO: 81 FL (ref 82–98)
MONOCYTES # BLD AUTO: 0.59 THOUSAND/ΜL (ref 0.17–1.22)
MONOCYTES NFR BLD AUTO: 10 % (ref 4–12)
NEUTROPHILS # BLD AUTO: 2.73 THOUSANDS/ΜL (ref 1.85–7.62)
NEUTS SEG NFR BLD AUTO: 44 % (ref 43–75)
NITRITE UR QL STRIP: NEGATIVE
NRBC BLD AUTO-RTO: 0 /100 WBCS
PH UR STRIP.AUTO: 5.5 [PH]
PLATELET # BLD AUTO: 288 THOUSANDS/UL (ref 149–390)
PMV BLD AUTO: 9.9 FL (ref 8.9–12.7)
POTASSIUM SERPL-SCNC: 4.2 MMOL/L (ref 3.5–5.3)
PROT SERPL-MCNC: 7.9 G/DL (ref 6.4–8.4)
PROT UR STRIP-MCNC: NEGATIVE MG/DL
RBC # BLD AUTO: 5.54 MILLION/UL (ref 3.81–5.12)
SARS-COV+SARS-COV-2 AG RESP QL IA.RAPID: NEGATIVE
SODIUM SERPL-SCNC: 138 MMOL/L (ref 135–147)
SP GR UR STRIP.AUTO: <=1.005 (ref 1–1.03)
UROBILINOGEN UR QL STRIP.AUTO: 0.2 E.U./DL
WBC # BLD AUTO: 6.1 THOUSAND/UL (ref 4.31–10.16)

## 2025-01-24 PROCEDURE — 84702 CHORIONIC GONADOTROPIN TEST: CPT | Performed by: EMERGENCY MEDICINE

## 2025-01-24 PROCEDURE — 81003 URINALYSIS AUTO W/O SCOPE: CPT

## 2025-01-24 PROCEDURE — 36415 COLL VENOUS BLD VENIPUNCTURE: CPT | Performed by: EMERGENCY MEDICINE

## 2025-01-24 PROCEDURE — 87804 INFLUENZA ASSAY W/OPTIC: CPT | Performed by: EMERGENCY MEDICINE

## 2025-01-24 PROCEDURE — 83690 ASSAY OF LIPASE: CPT | Performed by: EMERGENCY MEDICINE

## 2025-01-24 PROCEDURE — 80053 COMPREHEN METABOLIC PANEL: CPT | Performed by: EMERGENCY MEDICINE

## 2025-01-24 PROCEDURE — 96361 HYDRATE IV INFUSION ADD-ON: CPT

## 2025-01-24 PROCEDURE — 85025 COMPLETE CBC W/AUTO DIFF WBC: CPT | Performed by: EMERGENCY MEDICINE

## 2025-01-24 PROCEDURE — 99284 EMERGENCY DEPT VISIT MOD MDM: CPT

## 2025-01-24 PROCEDURE — 96375 TX/PRO/DX INJ NEW DRUG ADDON: CPT

## 2025-01-24 PROCEDURE — 96374 THER/PROPH/DIAG INJ IV PUSH: CPT

## 2025-01-24 PROCEDURE — 87811 SARS-COV-2 COVID19 W/OPTIC: CPT | Performed by: EMERGENCY MEDICINE

## 2025-01-24 PROCEDURE — 99285 EMERGENCY DEPT VISIT HI MDM: CPT | Performed by: EMERGENCY MEDICINE

## 2025-01-24 PROCEDURE — 74177 CT ABD & PELVIS W/CONTRAST: CPT

## 2025-01-24 RX ORDER — ACETAMINOPHEN 325 MG/1
650 TABLET ORAL EVERY 6 HOURS PRN
Qty: 30 TABLET | Refills: 0 | Status: SHIPPED | OUTPATIENT
Start: 2025-01-24

## 2025-01-24 RX ORDER — IBUPROFEN 800 MG/1
800 TABLET, FILM COATED ORAL 3 TIMES DAILY
Qty: 21 TABLET | Refills: 0 | Status: SHIPPED | OUTPATIENT
Start: 2025-01-24

## 2025-01-24 RX ORDER — FENTANYL CITRATE 50 UG/ML
50 INJECTION, SOLUTION INTRAMUSCULAR; INTRAVENOUS ONCE
Refills: 0 | Status: COMPLETED | OUTPATIENT
Start: 2025-01-24 | End: 2025-01-24

## 2025-01-24 RX ORDER — ONDANSETRON 2 MG/ML
4 INJECTION INTRAMUSCULAR; INTRAVENOUS ONCE
Status: COMPLETED | OUTPATIENT
Start: 2025-01-24 | End: 2025-01-24

## 2025-01-24 RX ORDER — KETOROLAC TROMETHAMINE 30 MG/ML
15 INJECTION, SOLUTION INTRAMUSCULAR; INTRAVENOUS ONCE
Status: COMPLETED | OUTPATIENT
Start: 2025-01-24 | End: 2025-01-24

## 2025-01-24 RX ORDER — TAMSULOSIN HYDROCHLORIDE 0.4 MG/1
0.4 CAPSULE ORAL
Qty: 30 CAPSULE | Refills: 0 | Status: SHIPPED | OUTPATIENT
Start: 2025-01-24

## 2025-01-24 RX ORDER — ACETAMINOPHEN 325 MG/1
975 TABLET ORAL ONCE
Status: COMPLETED | OUTPATIENT
Start: 2025-01-24 | End: 2025-01-24

## 2025-01-24 RX ADMIN — FENTANYL CITRATE 50 MCG: 50 INJECTION INTRAMUSCULAR; INTRAVENOUS at 18:14

## 2025-01-24 RX ADMIN — SODIUM CHLORIDE 1000 ML: 0.9 INJECTION, SOLUTION INTRAVENOUS at 16:49

## 2025-01-24 RX ADMIN — ONDANSETRON 4 MG: 2 INJECTION INTRAMUSCULAR; INTRAVENOUS at 16:50

## 2025-01-24 RX ADMIN — KETOROLAC TROMETHAMINE 15 MG: 30 INJECTION, SOLUTION INTRAMUSCULAR; INTRAVENOUS at 16:51

## 2025-01-24 RX ADMIN — ACETAMINOPHEN 325MG 975 MG: 325 TABLET ORAL at 16:48

## 2025-01-24 RX ADMIN — IOHEXOL 100 ML: 350 INJECTION, SOLUTION INTRAVENOUS at 17:30

## 2025-01-25 NOTE — DISCHARGE INSTRUCTIONS
You were seen in the emergency department for flank pain, your CT scan results are consistent with a kidney stone, we discussed your case with urology, and they would like to see you in the office.  We provided you with a prescription for Flomax, please pick it up at the pharmacy and take it as directed.  If your symptoms worsen or persist, please return to the emergency department immediately.

## 2025-01-25 NOTE — ED PROVIDER NOTES
Time reflects when diagnosis was documented in both MDM as applicable and the Disposition within this note       Time User Action Codes Description Comment    1/24/2025  7:23 PM Aelx Brandon Add [R10.9] Flank pain     1/24/2025  7:23 PM Alex Brandon [N20.0] Kidney stone           ED Disposition       ED Disposition   Discharge    Condition   Stable    Date/Time   Fri Jan 24, 2025  7:23 PM    Comment   Estrellita Lucero discharge to home/self care.                   Assessment & Plan       Medical Decision Making  39-year-old female presents to the emergency department with right-sided flank pain, differential diagnosis include pyelonephritis, nephrolithiasis, UTI, appendicitis, enterocolitis, viral syndrome, will perform CT abdomen pelvis, basic labs, urine analysis, and reassess.    Discussed case with urology, patient will follow-up in the office outpatient for further evaluation.  Strict return precautions given.  Patient or stands and agrees with treatment plan.    Amount and/or Complexity of Data Reviewed  Labs: ordered.  Radiology: ordered.    Risk  OTC drugs.  Prescription drug management.        ED Course as of 01/24/25 1926 Fri Jan 24, 2025   1852 MPRESSION:  1. Fluid-filled loops of small and large bowel may represent a mild enterocolitis. Correlate clinically.  2. 3 mm nonobstructing right renal calculus.     1922 Discussed case with urology, they will follow-up with her in the office.       Medications   ketorolac (TORADOL) injection 15 mg (15 mg Intravenous Given 1/24/25 1651)   ondansetron (ZOFRAN) injection 4 mg (4 mg Intravenous Given 1/24/25 1650)   sodium chloride 0.9 % bolus 1,000 mL (0 mL Intravenous Stopped 1/24/25 1748)   acetaminophen (TYLENOL) tablet 975 mg (975 mg Oral Given 1/24/25 1648)   iohexol (OMNIPAQUE) 350 MG/ML injection (MULTI-DOSE) 100 mL (100 mL Intravenous Given 1/24/25 1730)   fentaNYL injection 50 mcg (50 mcg Intravenous Given 1/24/25 1814)       ED Risk Strat Scores                           SBIRT 20yo+      Flowsheet Row Most Recent Value   Initial Alcohol Screen: US AUDIT-C     1. How often do you have a drink containing alcohol? 0 Filed at: 01/24/2025 1430   2. How many drinks containing alcohol do you have on a typical day you are drinking?  0 Filed at: 01/24/2025 1432   3b. FEMALE Any Age, or MALE 65+: How often do you have 4 or more drinks on one occassion? 0 Filed at: 01/24/2025 1432   Audit-C Score 0 Filed at: 01/24/2025 1433   ERICK: How many times in the past year have you...    Used an illegal drug or used a prescription medication for non-medical reasons? Never Filed at: 01/24/2025 1430                            History of Present Illness       Chief Complaint   Patient presents with    Flank Pain     Two weeks ago started with UTI symptoms, pt took left over prescription for Ceftin and azo, no relief now having flank pain, chills, diarrhea       Past Medical History:   Diagnosis Date    Asthma     Depression     GERD (gastroesophageal reflux disease)     Hypertension     IC (interstitial cystitis)     Migraines     PCOS (polycystic ovarian syndrome)     Pulmonary embolism (HCC) 2009    Venous insufficiency       Past Surgical History:   Procedure Laterality Date    ADENOIDECTOMY      CHOLECYSTECTOMY      LAPAROSCOPY      TONSILLECTOMY        Family History   Problem Relation Age of Onset    Hyperlipidemia Mother     Diabetes Mother     Hypertension Mother     Osteoporosis Mother     Anxiety disorder Mother     Depression Mother     Arthritis Mother     Anxiety disorder Father     Depression Father     Asthma Father     Deep vein thrombosis Father     Pulmonary embolism Father     Venous thrombosis Father     Heart disease Father     Hyperlipidemia Father     Hypertension Father     COPD Father     Factor V Leiden deficiency Father     Stroke Father     Lung cancer Father     Factor V Leiden deficiency Sister     Migraines Sister     Thyroid disease Sister      Polycystic ovary syndrome Sister     Diabetes Maternal Grandmother     Osteoporosis Maternal Grandmother     Breast cancer additional onset Paternal Grandmother 89    Breast cancer Paternal Grandmother 70    Cancer Paternal Grandmother         Breast    Diabetes Paternal Grandmother     Prostate cancer Paternal Uncle         mets      Social History     Tobacco Use    Smoking status: Never    Smokeless tobacco: Never   Vaping Use    Vaping status: Never Used   Substance Use Topics    Alcohol use: Not Currently    Drug use: Never      E-Cigarette/Vaping    E-Cigarette Use Never User       E-Cigarette/Vaping Substances    Nicotine No     THC No     CBD No     Flavoring No     Other No     Unknown No       I have reviewed and agree with the history as documented.     39-year-old female presents to the emergency department with complaint of right-sided flank pain.  Patient states that is been going on for the past week, and is reminiscent of when she had a kidney infection in the past.  Patient has been taking Keflex, Azo, with no relief.  She reports continued urinary frequency, and persistent right-sided flank pain.  She denies any chills, fevers, chest pain, shortness of breath, nausea, vomiting.  Patient does report an episode of diarrhea this past week.      Flank Pain  Associated symptoms: no chest pain, no chills, no cough, no dysuria, no fever, no hematuria, no shortness of breath, no sore throat and no vomiting        Review of Systems   Constitutional:  Negative for chills and fever.   HENT:  Negative for ear pain and sore throat.    Eyes:  Negative for pain and visual disturbance.   Respiratory:  Negative for cough and shortness of breath.    Cardiovascular:  Negative for chest pain and palpitations.   Gastrointestinal:  Negative for abdominal pain and vomiting.   Genitourinary:  Positive for flank pain, frequency and urgency. Negative for dysuria and hematuria.   Musculoskeletal:  Negative for arthralgias  and back pain.   Skin:  Negative for color change and rash.   Neurological:  Negative for seizures and syncope.   All other systems reviewed and are negative.          Objective       ED Triage Vitals   Temperature Pulse Blood Pressure Respirations SpO2 Patient Position - Orthostatic VS   01/24/25 1436 01/24/25 1436 01/24/25 1436 01/24/25 1436 01/24/25 1436 01/24/25 1436   97.8 °F (36.6 °C) 95 138/90 18 100 % Sitting      Temp Source Heart Rate Source BP Location FiO2 (%) Pain Score    01/24/25 1436 01/24/25 1436 01/24/25 1436 -- 01/24/25 1648    Temporal Monitor Left arm  7      Vitals      Date and Time Temp Pulse SpO2 Resp BP Pain Score FACES Pain Rating User   01/24/25 1900 -- 90 99 % -- 105/79 -- --    01/24/25 1845 -- 85 98 % -- 103/55 -- --    01/24/25 1830 -- 84 97 % -- 108/57 -- --    01/24/25 1815 -- 93 100 % -- 105/63 -- --    01/24/25 1814 -- -- -- -- -- 6 --    01/24/25 1800 -- 89 100 % -- 101/56 -- --    01/24/25 1745 -- 81 100 % 18 111/64 -- --    01/24/25 1730 -- -- 99 % -- -- -- --    01/24/25 1715 -- 86 100 % -- -- -- --    01/24/25 1648 -- -- -- -- -- 7 --    01/24/25 1645 -- 95 98 % -- 109/62 -- --    01/24/25 1630 -- 102 99 % -- -- -- --    01/24/25 1436 97.8 °F (36.6 °C) 95 100 % 18 138/90 -- -- SS            Physical Exam  Vitals and nursing note reviewed.   Constitutional:       General: She is not in acute distress.     Appearance: She is well-developed.   HENT:      Head: Normocephalic and atraumatic.   Eyes:      Conjunctiva/sclera: Conjunctivae normal.   Cardiovascular:      Rate and Rhythm: Normal rate and regular rhythm.   Pulmonary:      Effort: Pulmonary effort is normal. No respiratory distress.      Breath sounds: Normal breath sounds.   Abdominal:      Palpations: Abdomen is soft.      Tenderness: There is abdominal tenderness. There is right CVA tenderness.   Musculoskeletal:         General: No swelling.      Cervical back: Neck supple.   Skin:      General: Skin is warm and dry.      Capillary Refill: Capillary refill takes less than 2 seconds.   Neurological:      Mental Status: She is alert.   Psychiatric:         Mood and Affect: Mood normal.         Results Reviewed       Procedure Component Value Units Date/Time    Pregnancy, hCG, quantitative [[id number]]  (Normal) Collected: 01/24/25 1646    Lab Status: Final result Specimen: Blood from Arm, Left Updated: 01/24/25 1721     HCG, Quant 0.8 mIU/mL     Narrative:       Expected Ranges:    HCG results between 5.0 and 25.0 mIU/mL may be indicative of early pregnancy but should be interpreted in light of the total clinical presentation.    HCG can rise to detectable levels in ana rosa and post menopausal women (0-11.6 mIU/mL).     Approximate               Approximate HCG  Gestation age          Concentration ( mIU/mL)  _____________          ______________________   Weeks                      HCG values  0.2-1                       5-50  1-2                           2-3                         100-5000  3-4                         500-01429  4-5                         1000-68328  5-6                         [id number]  6-8                         [id number]  8-12                        [id number]      CMP [[id number]] Collected: 01/24/25 1646    Lab Status: Final result Specimen: Blood from Arm, Left Updated: 01/24/25 1713     Sodium 138 mmol/L      Potassium 4.2 mmol/L      Chloride 108 mmol/L      CO2 24 mmol/L      ANION GAP 6 mmol/L      BUN 12 mg/dL      Creatinine 0.89 mg/dL      Glucose 77 mg/dL      Calcium 9.4 mg/dL      AST 14 U/L      ALT 16 U/L      Alkaline Phosphatase 64 U/L      Total Protein 7.9 g/dL      Albumin 4.5 g/dL      Total Bilirubin 0.29 mg/dL      eGFR 81 ml/min/1.73sq m     Narrative:      National Kidney Disease Foundation guidelines for Chronic Kidney Disease (CKD):     Stage 1 with normal or high GFR (GFR > 90 mL/min/1.73 square meters)    Stage 2 Mild CKD (GFR = 60-89  mL/min/1.73 square meters)    Stage 3A Moderate CKD (GFR = 45-59 mL/min/1.73 square meters)    Stage 3B Moderate CKD (GFR = 30-44 mL/min/1.73 square meters)    Stage 4 Severe CKD (GFR = 15-29 mL/min/1.73 square meters)    Stage 5 End Stage CKD (GFR <15 mL/min/1.73 square meters)  Note: GFR calculation is accurate only with a steady state creatinine    Lipase [[id number]]  (Normal) Collected: 01/24/25 1646    Lab Status: Final result Specimen: Blood from Arm, Left Updated: 01/24/25 1713     Lipase 37 u/L     FLU/COVID Rapid Antigen (30 min. TAT) - Preferred screening test in ED [[id number]]  (Normal) Collected: 01/24/25 1646    Lab Status: Final result Specimen: Nares from Nose Updated: 01/24/25 1711     SARS COV Rapid Antigen Negative     Influenza A Rapid Antigen Negative     Influenza B Rapid Antigen Negative    Narrative:      This test has been performed using the Quidel Iliana 2 FLU+SARS Antigen test under the Emergency Use Authorization (EUA). This test has been validated by the  and verified by the performing laboratory. The Iliana uses lateral flow immunofluorescent sandwich assay to detect SARS-COV, Influenza A and Influenza B Antigen.     The Quidel Iliana 2 SARS Antigen test does not differentiate between SARS-CoV and SARS-CoV-2.     Negative results are presumptive and may be confirmed with a molecular assay, if necessary, for patient management. Negative results do not rule out SARS-CoV-2 or influenza infection and should not be used as the sole basis for treatment or patient management decisions. A negative test result may occur if the level of antigen in a sample is below the limit of detection of this test.     Positive results are indicative of the presence of viral antigens, but do not rule out bacterial infection or co-infection with other viruses.     All test results should be used as an adjunct to clinical observations and other information available to the provider.    FOR PEDIATRIC  PATIENTS - copy/paste COVID Guidelines URL to browser: [email or web address]    CBC and differential [[id number]]  (Abnormal) Collected: 01/24/25 1646    Lab Status: Final result Specimen: Blood from Arm, Left Updated: 01/24/25 1656     WBC 6.10 Thousand/uL      RBC 5.54 Million/uL      Hemoglobin 14.7 g/dL      Hematocrit 45.0 %      MCV 81 fL      MCH 26.5 pg      MCHC 32.7 g/dL      RDW 13.7 %      MPV 9.9 fL      Platelets 288 Thousands/uL      nRBC 0 /100 WBCs      Segmented % 44 %      Immature Grans % 0 %      Lymphocytes % 43 %      Monocytes % 10 %      Eosinophils Relative 2 %      Basophils Relative 1 %      Absolute Neutrophils 2.73 Thousands/µL      Absolute Immature Grans 0.01 Thousand/uL      Absolute Lymphocytes 2.61 Thousands/µL      Absolute Monocytes 0.59 Thousand/µL      Eosinophils Absolute 0.13 Thousand/µL      Basophils Absolute 0.03 Thousands/µL     UA w Reflex to Microscopic w Reflex to Culture [[id number]] Collected: 01/24/25 1505    Lab Status: Final result Specimen: Urine, Clean Catch Updated: 01/24/25 1510     Color, UA Yellow     Clarity, UA Clear     Specific Gravity, UA <=1.005     pH, UA 5.5     Leukocytes, UA Negative     Nitrite, UA Negative     Protein, UA Negative mg/dl      Glucose, UA Negative mg/dl      Ketones, UA Negative mg/dl      Urobilinogen, UA 0.2 E.U./dl      Bilirubin, UA Negative     Occult Blood, UA Negative            CT Abdomen pelvis with contrast   Final Interpretation by Juan Nicole MD (01/24 1842)   1. Fluid-filled loops of small and large bowel may represent a mild enterocolitis. Correlate clinically.   2. 3 mm nonobstructing right renal calculus.               Workstation performed: [id number]             Procedures    ED Medication and Procedure Management   Prior to Admission Medications   Prescriptions Last Dose Informant Patient Reported? Taking?   FLUoxetine (PROzac) 20 mg capsule   No No   Sig: TAKE 1  CAPSULE BY MOUTH EVERY DAY   Ozempic, 0.25 or 0.5 MG/DOSE, 2 MG/3ML injection pen   Yes No   Sig: Inject 0.25 mg under the skin Once a week   ibuprofen (MOTRIN) 200 mg tablet   No No   Sig: Take 3 tablets (600 mg total) by mouth every 6 (six) hours as needed for moderate pain   lisinopril (ZESTRIL) 20 mg tablet   Yes No   Sig: Take 20 mg by mouth daily   metFORMIN (GLUCOPHAGE) 1000 MG tablet   Yes No   Sig: Take 1,000 mg by mouth daily with breakfast   montelukast (SINGULAIR) 10 mg tablet  Self Yes No   Sig: Take 10 mg by mouth daily   rizatriptan (MAXALT) 10 mg tablet   No No   Sig: Take 1 tablet (10 mg total) by mouth once as needed for migraine May repeat in 2 hours if needed. Max 2/24 hours, 9/month.   topiramate (TOPAMAX) 100 mg tablet   No No   Sig: TAKE 1 TABLET BY MOUTH TWICE A DAY      Facility-Administered Medications: None     Patient's Medications   Discharge Prescriptions    ACETAMINOPHEN (TYLENOL) 325 MG TABLET    Take 2 tablets (650 mg total) by mouth every 6 (six) hours as needed for mild pain       Start Date: 1/24/2025 End Date: --       Order Dose: 650 mg       Quantity: 30 tablet    Refills: 0    IBUPROFEN (MOTRIN) 800 MG TABLET    Take 1 tablet (800 mg total) by mouth 3 (three) times a day       Start Date: 1/24/2025 End Date: --       Order Dose: 800 mg       Quantity: 21 tablet    Refills: 0    TAMSULOSIN (FLOMAX) 0.4 MG    Take 1 capsule (0.4 mg total) by mouth daily with dinner       Start Date: 1/24/2025 End Date: --       Order Dose: 0.4 mg       Quantity: 30 capsule    Refills: 0       ED SEPSIS DOCUMENTATION   Time reflects when diagnosis was documented in both MDM as applicable and the Disposition within this note       Time User Action Codes Description Comment    1/24/2025  7:23 PM Alex Brandon [R10.9] Flank pain     1/24/2025  7:23 PM Alex Brandon [N20.0] Kidney stone                  Alex Brandon,   01/24/25 1926

## 2025-01-27 ENCOUNTER — TELEPHONE (OUTPATIENT)
Age: 40
End: 2025-01-27

## 2025-01-27 PROBLEM — N20.0 RENAL CALCULI: Status: ACTIVE | Noted: 2025-01-27

## 2025-01-27 NOTE — PROGRESS NOTES
Name: Estrellita Lucero      : 1985      MRN: [id number]  Encounter Provider: MALACHI Tejada  Encounter Date: 2025   Encounter department: [facility] [place]  :  Assessment & Plan  Renal calculi  Ed visit 25 for right flank pain      Orders:    POCT urine dip auto non-scope    Kidney stone  Non obstructive 3 mm stone,  Low oxalate kidney stone diet  Follow yearly with KUB    Orders:    Ambulatory Referral to Urology    Interstitial cystitis  CT A&P with contrast confirmed 3mm non obstructing right renal calculus, suspicious for enterocolitis     Creatinine 0.89  POCT negative  Send for culture  Prior relief with DMSO  Adequate hydration  Pelvic floor physical therapy  Irritative voiding diet  Prelief, marshmallow root tea, aloe vera gel caps  F/u office cystoscopy  DMSO instillations ordering initiated  ED precautions  Orders:    Ambulatory referral to Physical Therapy; Future    Counseled on stone diet.  Literature given. The dietary recommendations for most people who make kidney stones (especially the most common calcium oxalate stones) are uncomplicated and are not too tedious or bland.  Most importantly, the following recommendations also promote better health for a variety of reasons. He understands bare bones recommendations include increasing fluids.  This is the single most important change for the majority of patients. All fluids count.  Incorporating a low-salt diet, moderate consumption of red meat and moderate calcium intake (dairy products), especially with meals.    Recommend continue to hydrate well with water, follow low oxalate kidney stone diet, literature given.  Limit intake of protein as well as sodium including sports drinks such as gatorade that contain increased amounts of these.  Recommend no added calcium to diet, but adhere to recommended daily allowance of this.      Regarding he interstitial cystitis. She understands this is a chronic  condition that causes bladder pressure and subjective pelvic pain.  Severity of these symptoms varies widely and can be very painful.  She has struggled with this for years and was previously successfully managed with multiple appointments for DMSO instillation.  Like to try this therapy once more.  Will work on ordering this.  She understands  she will call us when she obtains delivery.  Also discussed office cystoscopy for chronicity of issues.  She understands this is an office based procedure where she is awake.  Other options reviewed including avoidance of irritative food such as caffeine, alcohol, spicy and acidic foods that can irritate the bladder bladder training, this requires you to gradually increase intervals between urination to be able to hold urine longer in the hopes of managing symptoms.  Stress management including meditation and deep breathing exercises and stretching can be helpful. We also discussed her diarrhea and current enterocolitis can be contributing to dehydration and will work to hydrate well    She has tried therapy with antihistamines including hydroxyzine, tricyclic antidepressants pain relievers such as NSAIDs in the past and is not interested in restarting these.  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  Also discussed natural, herbal remedies such as aloe vera gel caps, marshmallow root tea were also discussed.  Additional we briefly reviewed prelief, Uribel and Cystex and their role in helping to relieve bladder discomfort.      Moving forward she will try prelief and reconsider pelvic floor physical therapy along with irritative voiding diet.  See her back for office cystoscopy.  Pt  will speak up if any questions or concerns.  All questions were answered.  Patient understands and agrees with the plan        History of Present Illness   Estrellita Lucero is a 39 y.o. female new to office who presents for  "f/u of ED visit 1/24/25 for right sided flank pain. PMH pyelonephritis, IC, UTI, kidney stones. CT A&P with contrast confirmed 3mm non obstructing right renal calculus, suspicious for enterocolitis   Creatinine 0.89, urine not suspicious for infection.  She is on flomax from ED.  History of  urodynamics and full IC work up.  Was doing well for several years.  Had pyelonephritis during her pregnancy 9/20/24 with a few UTI in pregnancy. Last cysto 14 years ago.  Used hydroxyzine and elavil therapy without relief.  Did try pelvic floor physical therapy with some relief.  States her symptoms became worse after childbirth.  Two weeks ago onset of increased burning and some hesitancy. States she used left over cipro antibiotic that she had at home. Followed by [facility] with temporary relief. Also used heat, ibuprofen with no results.    Her dysuria is very  bothersome at this time.  Previously managed with daily DMSO for many weeks at a time.  She reports she was eventually tapered down to three times weekly then to twice weekly eventually.  She is otherwise well without systemic symptoms. Denies fever, chills, flank pain,       Review of Systems   Constitutional: Negative.  Negative for chills and fever.   HENT: Negative.     Eyes: Negative.    Respiratory: Negative.  Negative for chest tightness and shortness of breath.    Cardiovascular: Negative.    Gastrointestinal: Negative.  Negative for abdominal distention, constipation, diarrhea, nausea and vomiting.   Endocrine: Negative.    Genitourinary:  Positive for difficulty urinating (some hesitancy). Negative for dysuria, flank pain, frequency, hematuria and urgency.   Musculoskeletal:  Negative for back pain and neck pain.   Skin: Negative.    Neurological: Negative.  Negative for dizziness and headaches.   Psychiatric/Behavioral: Negative.  Negative for agitation and behavioral problems.           Objective   /70   Pulse 101   Temp 98 °F (36.7 °C)   Ht 5' 11\" " (1.803 m)   Wt 99 kg (218 lb 3.2 oz)   SpO2 100%   BMI 30.43 kg/m²     Physical Exam  Vitals reviewed.   Constitutional:       General: She is not in acute distress.  HENT:      Head: Normocephalic and atraumatic.      Right Ear: External ear normal.      Left Ear: External ear normal.      Nose: Nose normal.   Eyes:      Extraocular Movements: Extraocular movements intact.      Pupils: Pupils are equal, round, and reactive to light.   Cardiovascular:      Rate and Rhythm: Normal rate.   Pulmonary:      Effort: Pulmonary effort is normal. No respiratory distress.   Abdominal:      Palpations: Abdomen is soft.      Tenderness: There is no right CVA tenderness, left CVA tenderness or guarding.   Musculoskeletal:         General: Normal range of motion.      Cervical back: Normal range of motion and neck supple.   Skin:     General: Skin is warm and dry.   Neurological:      General: No focal deficit present.      Mental Status: She is alert and oriented to person, place, and time. Mental status is at baseline.   Psychiatric:         Mood and Affect: Mood normal.         Behavior: Behavior normal.         Thought Content: Thought content normal.         Judgment: Judgment normal.          Results  CT ABDOMEN AND PELVIS WITH IV CONTRAST     INDICATION: RLQ pain. .     COMPARISON: None.     TECHNIQUE: CT examination of the abdomen and pelvis was performed. Multiplanar 2D reformatted images were created from the source data.     This examination, like all CT scans performed in the [facility], was performed utilizing techniques to minimize radiation dose exposure, including the use of iterative reconstruction and automated exposure control. Radiation dose length   product (DLP) for this visit: 735 mGy-cm     IV Contrast: 100 mL of iohexol (OMNIPAQUE)  Enteric Contrast: Not administered.     FINDINGS:     ABDOMEN     LOWER CHEST: No clinically significant abnormality in the visualized lower chest.    "  LIVER/BILIARY TREE: Unremarkable.     GALLBLADDER: No calcified gallstones. No pericholecystic inflammatory change.     SPLEEN: Unremarkable. Small splenule.     PANCREAS: Unremarkable.     ADRENAL GLANDS: Unremarkable.     KIDNEYS/URETERS: 3 mm nonobstructing right renal calculus. No hydronephrosis.     STOMACH AND BOWEL: Fluid-filled loops of small and large bowel may represent a mild enterocolitis.     APPENDIX: No findings to suggest appendicitis. Normal caliber appendix visualized.     ABDOMINOPELVIC CAVITY: No ascites. No pneumoperitoneum. No lymphadenopathy.     VESSELS: Unremarkable for patient's age.     PELVIS     REPRODUCTIVE ORGANS: IUD within the fundal endometrial cavity.     URINARY BLADDER: Unremarkable.     ABDOMINAL WALL/INGUINAL REGIONS: Unremarkable.     BONES: No acute fracture or suspicious osseous lesion.     IMPRESSION:  1. Fluid-filled loops of small and large bowel may represent a mild enterocolitis. Correlate clinically.  2. 3 mm nonobstructing right renal calculus.              Workstation performed: [id number]        Imaging    CT Abdomen pelvis with contrast (Order: [id number]) - 1/24/2025      No results found for: \"PSA\"  Lab Results   Component Value Date    CALCIUM 9.4 01/24/2025    K 4.2 01/24/2025    CO2 24 01/24/2025     01/24/2025    BUN 12 01/24/2025    CREATININE 0.89 01/24/2025     Lab Results   Component Value Date    WBC 6.10 01/24/2025    HGB 14.7 01/24/2025    HCT 45.0 01/24/2025    MCV 81 (L) 01/24/2025     01/24/2025       Office Urine Dip  No results found for this or any previous visit (from the past hour).]      "

## 2025-01-27 NOTE — PATIENT INSTRUCTIONS
Adequate hydration with water to produce pale yellow urine  Limit intake of protein, sodium and continue with recommended daily allowance of calcium  Low oxalate kidney stone diet  ED precautions for fever, chills, flank pain, trouble urinating, nausea, vomiting, pain uncontrolled by pain medications

## 2025-01-27 NOTE — TELEPHONE ENCOUNTER
New Patient    What is the reason for the patient's appointment?: Kidney Stones     What office location does the patient prefer?: [place]    Does patient have Imaging/Lab Results:  CT abdomen pelvis 1/24/25  KIDNEYS/URETERS: 3 mm nonobstructing right renal calculus. No hydronephrosis.     Have patient records been requested?:  If No, are the records showing in Epic: Yes    INSURANCE:   Do we accept the patient's insurance or is the patient Self-Pay?: Yes     Insurance Provider: [employer]  Plan Type/Number:   Member ID#: [id number]     HISTORY:   Has the patient had any previous Urologist(s)?: Several years ago - [facility], unrelated to kidney stones    Was the patient seen in the ED?: 1/24/25    Does the patient have a personal history of cancer?: N/A    Appointment scheduled for 1/28/2025.

## 2025-01-28 ENCOUNTER — OFFICE VISIT (OUTPATIENT)
Dept: UROLOGY | Facility: CLINIC | Age: 40
End: 2025-01-28
Payer: COMMERCIAL

## 2025-01-28 ENCOUNTER — PATIENT MESSAGE (OUTPATIENT)
Dept: UROLOGY | Facility: CLINIC | Age: 40
End: 2025-01-28

## 2025-01-28 VITALS
TEMPERATURE: 98 F | HEART RATE: 101 BPM | WEIGHT: 218.2 LBS | BODY MASS INDEX: 30.55 KG/M2 | OXYGEN SATURATION: 100 % | SYSTOLIC BLOOD PRESSURE: 120 MMHG | DIASTOLIC BLOOD PRESSURE: 70 MMHG | HEIGHT: 71 IN

## 2025-01-28 DIAGNOSIS — R39.89 BLADDER PAIN: ICD-10-CM

## 2025-01-28 DIAGNOSIS — N20.0 KIDNEY STONE: ICD-10-CM

## 2025-01-28 DIAGNOSIS — N20.0 RENAL CALCULI: Primary | ICD-10-CM

## 2025-01-28 DIAGNOSIS — N30.10 INTERSTITIAL CYSTITIS: ICD-10-CM

## 2025-01-28 LAB
SL AMB  POCT GLUCOSE, UA: ABNORMAL
SL AMB LEUKOCYTE ESTERASE,UA: ABNORMAL
SL AMB POCT BILIRUBIN,UA: ABNORMAL
SL AMB POCT BLOOD,UA: ABNORMAL
SL AMB POCT CLARITY,UA: CLEAR
SL AMB POCT COLOR,UA: YELLOW
SL AMB POCT KETONES,UA: ABNORMAL
SL AMB POCT NITRITE,UA: ABNORMAL
SL AMB POCT PH,UA: 6
SL AMB POCT SPECIFIC GRAVITY,UA: 1.02
SL AMB POCT URINE PROTEIN: ABNORMAL
SL AMB POCT UROBILINOGEN: 0.2

## 2025-01-28 PROCEDURE — 87077 CULTURE AEROBIC IDENTIFY: CPT

## 2025-01-28 PROCEDURE — 81003 URINALYSIS AUTO W/O SCOPE: CPT

## 2025-01-28 PROCEDURE — 99204 OFFICE O/P NEW MOD 45 MIN: CPT

## 2025-01-28 PROCEDURE — 87086 URINE CULTURE/COLONY COUNT: CPT

## 2025-01-28 RX ORDER — AMITRIPTYLINE HYDROCHLORIDE 50 MG/1
TABLET ORAL
COMMUNITY
Start: 2025-01-04

## 2025-01-28 RX ORDER — SEMAGLUTIDE 2.4 MG/.75ML
INJECTION, SOLUTION SUBCUTANEOUS
COMMUNITY
Start: 2025-01-16

## 2025-01-28 RX ORDER — ALBUTEROL SULFATE 90 UG/1
INHALANT RESPIRATORY (INHALATION)
COMMUNITY
Start: 2024-11-15

## 2025-01-28 RX ORDER — BUSPIRONE HYDROCHLORIDE 5 MG/1
5 TABLET ORAL 2 TIMES DAILY
COMMUNITY

## 2025-01-28 NOTE — ASSESSMENT & PLAN NOTE
CT A&P with contrast confirmed 3mm non obstructing right renal calculus, suspicious for enterocolitis     Creatinine 0.89  POCT negative  Send for culture  Prior relief with DMSO  Adequate hydration  Pelvic floor physical therapy  Irritative voiding diet  Prelief, marshmallow root tea, aloe vera gel caps  F/u office cystoscopy  DMSO instillations ordering initiated  ED precautions  Orders:    Ambulatory referral to Physical Therapy; Future

## 2025-01-29 ENCOUNTER — TELEPHONE (OUTPATIENT)
Dept: UROLOGY | Facility: CLINIC | Age: 40
End: 2025-01-29

## 2025-01-29 LAB — BACTERIA UR CULT: ABNORMAL

## 2025-01-29 NOTE — TELEPHONE ENCOUNTER
Left message for pt to call office back please advise Evelin Martinez note below.     Please also let pt know we are still working on dmso treatments. Will call her tomorrow to follow up.

## 2025-01-29 NOTE — TELEPHONE ENCOUNTER
When she was at her appointment yesterday we had discussed the pros and cons and whether it was necessary to do an office cystoscopy. Please let patient know I did consult with the team and we would like to proceed with the office cystoscopy to look inside her bladder.  This is just to confirm there is no new pathology. She does have this appointment scheduled for 3/11/25.  This should be done prior to DMSO instillations.

## 2025-01-30 ENCOUNTER — RESULTS FOLLOW-UP (OUTPATIENT)
Dept: UROLOGY | Facility: CLINIC | Age: 40
End: 2025-01-30

## 2025-01-30 NOTE — TELEPHONE ENCOUNTER
Incoming request received regarding the following medication:  Requested Prescriptions   Pending Prescriptions Disp Refills    labetalol (NORMODYNE) 200 MG tablet [Pharmacy Med Name: Labetalol HCl 200 MG Oral Tablet] 270 tablet 0     Sig: TAKE 1 TABLET BY MOUTH IN THE MORNING AND 2 TABS AT BEDTIME       Beta Blocker Refill Protocol - 12 Month Protocol Failed - 9/18/2024  9:23 AM        Failed - Medication (including dose and sig) on current meds list     Outpatient Current Medications as of as of 9/18/2024         Disp Refills Start End    metOLazone (ZAROXOLYN) 2.5 MG tablet 20 tablet 1 7/25/2024 --    Sig - Route: Take 1 tablet by mouth as needed (2 times weekly). Please take 30 minutes before lasix dose, on Monday, Thursday - Oral    Class: Eprescribe    labetalol (NORMODYNE) 200 MG tablet 270 tablet 0 6/26/2024 --    Sig: TAKE 1 TABLET BY MOUTH IN THE MORNING AND 2 AT BEDTIME    Class: Eprescribe    furosemide (LASIX) 20 MG tablet 90 tablet 1 6/18/2024 --    Sig - Route: Take 1 tablet by mouth daily. - Oral    Class: Eprescribe    ferrous sulfate 325 (65 FE) MG tablet 90 tablet 3 5/8/2024 --    Sig - Route: Take 1 tablet by mouth once daily with breakfast - Oral    Class: Eprescribe    atorvastatin (LIPITOR) 10 MG tablet 90 tablet 3 4/23/2024 --    Sig - Route: Take 1 tablet by mouth daily. - Oral    Class: Eprescribe    calcitRIOL (ROCALTROL) 0.25 MCG capsule 32 capsule 3 2/8/2024 --    Sig - Route: Take 1 capsule by mouth 2 days a week. - Oral    Class: Eprescribe    cholecalciferol (VITAMIN D) 25 mcg (1,000 units) tablet -- -- 3/29/2023 --    Sig - Route: Take 25 mcg by mouth daily. Take 2 tablets daily - Oral    Class: Historical Med    timolol (TIMOPTIC) 0.25 % ophthalmic solution -- --  --    Sig - Route: Place 1 drop into right eye 2 times daily. - Right Eye    Class: Historical Med    latanoprost (XALATAN) 0.005 % ophthalmic solution -- --  --    Sig - Route: Place 1 drop into both eyes nightly. - Both  Left message for patient to call the office back regarding below message. Please relay message to patient when she returns call.    Eyes    Class: Historical Med    aspirin 81 MG chewable tablet -- --  --    Sig - Route: Chew 81 mg by mouth daily. - Oral    Class: Historical Med            ~~~~~~~~~~~~~~~~~~~~~~~~~~~~~~~~~~~~~~~~~~~~~            Passed - Seen by prescribing provider or same department within the last 12 months or has a future appt in 3 months - IF FAILED PLEASE LOOK AT CHART REVIEW FOR LAST VISIT AND PROCEED ACCORDINGLY     Recent Visits  Date Type Provider Dept   05/22/24 Office Visit Li Rausch MD [facility]   02/07/24 Office Visit Li Rausch MD [facility]   Showing recent visits within past 365 days with a meds authorizing provider and meeting all other requirements  Future Appointments  Date Type Provider Dept   09/25/24 Appointment Li Rausch MD [facility]   Showing future appointments within next 90 days with a meds authorizing provider and meeting all other requirements      ~~~~~~~~~~~~~~~~~~~~~~~~~~~~~~~~~~~~~~~~~~~~~            Passed - Last recorded pulse is greater than 49     Pulse Readings from Last 1 Encounters:   05/22/24 65       ~~~~~~~~~~~~~~~~~~~~~~~~~~~~~~~~~~~~~~~~~~~~~            Passed - Request is NOT for Sotalol HCL       Sotalol HCL may NOT be refill.  Please route to provider for approval.    ~~~~~~~~~~~~~~~~~~~~~~~~~~~~~~~~~~~~~~~~~~~~~            Passed - Current med list does not contain more than one Beta Blocker medication        Passed - Last BP was equal to or less than 150/100 -- IF CRITERIA FAILED REFER TO PROTOCOL DETAILS     BP Readings from Last 1 Encounters:   05/22/24 128/74       IF BP CRITERIA FAILED - provide a courtesy refill 3 months and schedule a medication titration appointment unless noted in provider’s chart note. Courtesy refill may not be repeated.    ~~~~~~~~~~~~~~~~~~~~~~~~~~~~~~~~~~~~~~~~~~~~~                      Last Office visit:  5/22/2024    Plan at last office visit:   Continue labetalol 200/400 mg     Next office visit:  9/25/2024      DISPOSITION: Rx refilled per protocol.

## 2025-01-30 NOTE — TELEPHONE ENCOUNTER
Patient returned call.    Reviewed previous messages about   Cystoscopy appt on 03/11/25 to be completed prior to dmso treaments;  And also that office is still working on dmso treatments;  Patient verbalized understanding.

## 2025-01-30 NOTE — TELEPHONE ENCOUNTER
----- Message from MALACHI Tejada sent at 1/30/2025 12:52 PM EST -----  Lactobacillus noted on culture in setting of typical IC discomfort.  Would not recommend treatment.  Please encourage pateint to hydrate well and follow irritative voiding diet

## 2025-02-12 ENCOUNTER — APPOINTMENT (OUTPATIENT)
Dept: LAB | Facility: MEDICAL CENTER | Age: 40
End: 2025-02-12
Payer: MEDICARE

## 2025-02-12 ENCOUNTER — TELEPHONE (OUTPATIENT)
Dept: GASTROENTEROLOGY | Facility: MEDICAL CENTER | Age: 40
End: 2025-02-12

## 2025-02-12 ENCOUNTER — APPOINTMENT (OUTPATIENT)
Dept: RADIOLOGY | Facility: MEDICAL CENTER | Age: 40
End: 2025-02-12
Payer: MEDICARE

## 2025-02-12 ENCOUNTER — OFFICE VISIT (OUTPATIENT)
Dept: GASTROENTEROLOGY | Facility: MEDICAL CENTER | Age: 40
End: 2025-02-12
Payer: MEDICARE

## 2025-02-12 VITALS
HEIGHT: 71 IN | BODY MASS INDEX: 29.76 KG/M2 | WEIGHT: 212.6 LBS | DIASTOLIC BLOOD PRESSURE: 84 MMHG | SYSTOLIC BLOOD PRESSURE: 117 MMHG | TEMPERATURE: 97 F | OXYGEN SATURATION: 99 % | HEART RATE: 92 BPM

## 2025-02-12 DIAGNOSIS — R11.0 NAUSEA: ICD-10-CM

## 2025-02-12 DIAGNOSIS — R93.5 ABNORMAL CT OF THE ABDOMEN: Primary | ICD-10-CM

## 2025-02-12 DIAGNOSIS — R93.5 ABNORMAL CT OF THE ABDOMEN: ICD-10-CM

## 2025-02-12 DIAGNOSIS — R19.7 ACUTE DIARRHEA: ICD-10-CM

## 2025-02-12 DIAGNOSIS — K52.9 ENTEROCOLITIS: ICD-10-CM

## 2025-02-12 DIAGNOSIS — K21.9 GASTROESOPHAGEAL REFLUX DISEASE, UNSPECIFIED WHETHER ESOPHAGITIS PRESENT: ICD-10-CM

## 2025-02-12 DIAGNOSIS — K58.2 IRRITABLE BOWEL SYNDROME WITH ALTERNATING BOWEL HABITS: ICD-10-CM

## 2025-02-12 DIAGNOSIS — Z00.6 ENCOUNTER FOR EXAMINATION FOR NORMAL COMPARISON OR CONTROL IN CLINICAL RESEARCH PROGRAM: ICD-10-CM

## 2025-02-12 PROCEDURE — 74018 RADEX ABDOMEN 1 VIEW: CPT

## 2025-02-12 PROCEDURE — 82784 ASSAY IGA/IGD/IGG/IGM EACH: CPT

## 2025-02-12 PROCEDURE — 36415 COLL VENOUS BLD VENIPUNCTURE: CPT

## 2025-02-12 PROCEDURE — 86364 TISS TRNSGLTMNASE EA IG CLAS: CPT

## 2025-02-12 PROCEDURE — 99204 OFFICE O/P NEW MOD 45 MIN: CPT | Performed by: INTERNAL MEDICINE

## 2025-02-12 RX ORDER — POLYETHYLENE GLYCOL-3350 AND ELECTROLYTES WITH FLAVOR PACK 240; 5.84; 2.98; 6.72; 22.72 G/278.26G; G/278.26G; G/278.26G; G/278.26G; G/278.26G
POWDER, FOR SOLUTION ORAL
Qty: 4000 ML | Refills: 0 | Status: SHIPPED | OUTPATIENT
Start: 2025-02-12

## 2025-02-12 RX ORDER — FAMOTIDINE 40 MG/1
40 TABLET, FILM COATED ORAL
Qty: 30 TABLET | Refills: 3 | Status: SHIPPED | OUTPATIENT
Start: 2025-02-12

## 2025-02-12 RX ORDER — HYOSCYAMINE SULFATE, METHENAMINE, METHYLENE BLUE, PHENYL SALICYLATE, AND SODIUM PHOSPHATE, MONOBASIC, MONOHYDRATE .12; 81; 10.8; 32.4; 40.8 MG/1; MG/1; MG/1; MG/1; MG/1
1 TABLET ORAL AS NEEDED
COMMUNITY
Start: 2025-02-03

## 2025-02-12 RX ORDER — SODIUM CHLORIDE, SODIUM LACTATE, POTASSIUM CHLORIDE, CALCIUM CHLORIDE 600; 310; 30; 20 MG/100ML; MG/100ML; MG/100ML; MG/100ML
125 INJECTION, SOLUTION INTRAVENOUS CONTINUOUS
OUTPATIENT
Start: 2025-02-12

## 2025-02-12 RX ORDER — HYDROXYZINE HYDROCHLORIDE 10 MG/1
10 TABLET, FILM COATED ORAL
COMMUNITY
Start: 2025-01-30

## 2025-02-12 NOTE — PROGRESS NOTES
Name: Estrellita Lucero      : 1985      MRN: [id number]  Encounter Provider: Diana M Jaiyeola, MD  Encounter Date: 2025   [facility]  :  Assessment & Plan  Abnormal CT of the abdomen  She presented to the emergency room recently for abdominal discomfort, diarrhea.  She was found to have a nonobstructing kidney stone and interstitial cystitis for which she is following with urology.  Her CT also showed enterocolitis.  We discussed given the acuity of her symptoms they may have been of a infectious etiology and I have ordered stool infectious studies for further workup.  Prior to this time she reported a longstanding history of alternating bowel habits and continues to have loose stools but can have 1 week without a full bowel movement.  I have ordered KUB today for evaluation of her stool burden to rule out overflow diarrhea.  Given her longstanding symptoms, and abnormal CT I recommend EGD and colonoscopy be performed obtain gastric, duodenal biopsies, terminal ileal intubation and random colon biopsies  She also reports nausea which may be related to her recent viral illness.  She resumed Wegovy 2 months ago and the delay in gastric emptying may be contributing to her upper GI symptoms however she states she was on this medication for over a year in the past and tolerated it well.  Recommend starting Pepcid 40 mg daily at night.  If she has evidence of objective signs of acid reflux, esophagitis or peptic ulcer disease on her endoscopy she may require resuming daily PPI.  Orders:    polyethylene glycol (GOLYTELY) 4000 mL solution; As instructed by the GI office    Colonoscopy; Future    Enterocolitis    Orders:    Colonoscopy; Future    Gastroesophageal reflux disease, unspecified whether esophagitis present    Orders:    EGD; Future    Nausea    Orders:    famotidine (PEPCID) 40 MG tablet; Take 1 tablet (40 mg total) by mouth daily at  bedtime    EGD; Future    Irritable bowel syndrome with alternating bowel habits    Orders:    famotidine (PEPCID) 40 MG tablet; Take 1 tablet (40 mg total) by mouth daily at bedtime    Tissue Transglutaminase, IgA; Future    IgA; Future    XR abdomen 1 view kub; Future    Acute diarrhea    Orders:    Clostridium difficile toxin by PCR with EIA; Future    Stool Enteric Bacterial Panel by PCR; Future    Enteric Parasite Panel; Future    Calprotectin,Fecal; Future        History of Present Illness   HPI  Estrellita Lucero is a 39 y.o. female  with history of hypertension, diabetes, migraine headaches who presents for evaluation.  She was seen in the emergency room in January with flank pain and diarrhea.  Her labs in the ER showed normal liver enzymes, hemoglobin 14.7, platelets 288, CT abdomen pelvis showed fluid-filled loops of small and large bowel which may represent mild enterocolitis in addition to a 3 mm nonobstructing renal calculus and interstitial cystitis.    She reports a longstanding history of acid reflux symptoms for which she was on Prilosec.  During her pregnancy she discontinued her Prilosec and reflux symptoms have been overall controlled.  More recently she reports nausea, abdominal bloating and low appetite.  She was also taking Wegovy for some time prior to her pregnancy and lost over 100 pounds.  She recently restarted Wegovy in December and is currently at her maintenance dose.  She has lost approximately 30 to 35 pounds since restarting the medication.  She presented to the emergency room due to generalized abdominal discomfort, flank pain and diarrhea.  She reports a longstanding history of alternating bowel habits but more recently has been having loose green stool will have episodes of 1 week without a bowel movement.  She denies rectal bleeding.  She reports no triggers to her symptoms such as sick contacts, travel or new medications.    She has no prior GI surgical history  She has a  "family history of her maternal grandmother with diverticulitis but denies family history of colon cancer  She takes antiplatelet or anticoagulant medications.  She reports a prior diagnosis of a PE in the past for which she was on Lovenox.    2011 colonoscopy was normal.  She states she had an endoscopy at that time due to dysphagia which was also normal.  Procedure report for the endoscopy is not available for review    2021 EGD biopsies showed chronic inflammation of cardiac type gastric mucosa negative for Coleman's esophagus with mild reflux changes negative for dysplasia          Review of Systems   Constitutional:  Negative for chills and fever.   HENT:  Negative for ear pain and sore throat.    Eyes:  Negative for pain and visual disturbance.   Respiratory:  Negative for cough and shortness of breath.    Cardiovascular:  Negative for chest pain and palpitations.   Gastrointestinal:  Positive for abdominal distention, constipation, diarrhea and nausea. Negative for abdominal pain and vomiting.   Genitourinary:  Negative for dysuria and hematuria.   Musculoskeletal:  Negative for arthralgias and back pain.   Skin:  Negative for color change and rash.   Neurological:  Negative for seizures and syncope.   All other systems reviewed and are negative.         Objective   /84 (BP Location: Left arm, Patient Position: Sitting, Cuff Size: Large)   Pulse 92   Temp (!) 97 °F (36.1 °C) (Tympanic)   Ht 5' 11\" (1.803 m)   Wt 96.4 kg (212 lb 9.6 oz)   SpO2 99%   BMI 29.65 kg/m²      Physical Exam  Vitals and nursing note reviewed.   Constitutional:       General: She is not in acute distress.     Appearance: She is well-developed.   HENT:      Head: Normocephalic and atraumatic.   Eyes:      Conjunctiva/sclera: Conjunctivae normal.   Cardiovascular:      Rate and Rhythm: Normal rate and regular rhythm.      Heart sounds: No murmur heard.  Pulmonary:      Effort: Pulmonary effort is normal. No respiratory " distress.      Breath sounds: Normal breath sounds.   Abdominal:      Palpations: Abdomen is soft.      Tenderness: There is generalized abdominal tenderness.   Musculoskeletal:         General: No swelling.      Cervical back: Neck supple.   Skin:     General: Skin is warm and dry.      Capillary Refill: Capillary refill takes less than 2 seconds.   Neurological:      Mental Status: She is alert.   Psychiatric:         Mood and Affect: Mood normal.

## 2025-02-12 NOTE — TELEPHONE ENCOUNTER
Procedure: Colon/EGD  Date: 3/21/25  Physician performing: Dr. Jaiyeola  Location of procedure:  [facility]  Instructions given to patient: John  Diabetic: Diabetic- Metformin, wegovy  Clearances: N/A

## 2025-02-13 ENCOUNTER — TELEPHONE (OUTPATIENT)
Dept: GASTROENTEROLOGY | Facility: MEDICAL CENTER | Age: 40
End: 2025-02-13

## 2025-02-13 LAB — IGA SERPL-MCNC: 189 MG/DL (ref 66–433)

## 2025-02-13 NOTE — TELEPHONE ENCOUNTER
[facility] to review instructions for procedure

## 2025-02-16 ENCOUNTER — RESULTS FOLLOW-UP (OUTPATIENT)
Dept: GASTROENTEROLOGY | Facility: MEDICAL CENTER | Age: 40
End: 2025-02-16

## 2025-02-16 LAB — TTG IGA SER IA-ACNC: 0.4 U/ML (ref ?–10)

## 2025-02-17 ENCOUNTER — APPOINTMENT (OUTPATIENT)
Dept: LAB | Facility: CLINIC | Age: 40
End: 2025-02-17
Payer: MEDICARE

## 2025-02-17 DIAGNOSIS — R19.7 ACUTE DIARRHEA: ICD-10-CM

## 2025-02-17 PROCEDURE — 83993 ASSAY FOR CALPROTECTIN FECAL: CPT

## 2025-02-17 PROCEDURE — 87506 IADNA-DNA/RNA PROBE TQ 6-11: CPT

## 2025-02-18 LAB
C COLI+JEJUNI TUF STL QL NAA+PROBE: NEGATIVE
C DIFF TOX GENS STL QL NAA+PROBE: NEGATIVE
CRYPTOSP DNA SPEC QL NAA+PROBE: NEGATIVE
E HISTOLYT DNA SPEC QL NAA+PROBE: NEGATIVE
EC STX1+STX2 GENES STL QL NAA+PROBE: NEGATIVE
G LAMBLIA DNA SPEC QL NAA+PROBE: NEGATIVE
SALMONELLA SP SPAO STL QL NAA+PROBE: NEGATIVE
SHIGELLA SP+EIEC IPAH STL QL NAA+PROBE: NEGATIVE

## 2025-02-19 LAB — CALPROTECTIN STL-MCNC: 130 ΜG/G

## 2025-02-19 NOTE — RESULT ENCOUNTER NOTE
Please call the patient with the results  Please let her know that her stool infectious tests are negative.  Her stool inflammation test was elevated.  This test is not specific and can be due to recent viral infections, medications or autoimmune disorders.  She is scheduled for endoscopy and colonoscopy with me in March.  If she is still having symptoms it is reasonable to look for a sooner date for her procedures given her abnormal stool testing.  Please see if there is a sooner date available with me.  If not please place the patient on a cancellation list.

## 2025-02-20 ENCOUNTER — HOSPITAL ENCOUNTER (EMERGENCY)
Facility: HOSPITAL | Age: 40
Discharge: HOME | End: 2025-02-20
Attending: EMERGENCY MEDICINE | Admitting: EMERGENCY MEDICINE
Payer: COMMERCIAL

## 2025-02-20 ENCOUNTER — APPOINTMENT (EMERGENCY)
Dept: CT IMAGING | Facility: HOSPITAL | Age: 40
End: 2025-02-20
Payer: COMMERCIAL

## 2025-02-20 ENCOUNTER — TELEPHONE (OUTPATIENT)
Age: 40
End: 2025-02-20

## 2025-02-20 VITALS
WEIGHT: 212 LBS | HEART RATE: 79 BPM | RESPIRATION RATE: 18 BRPM | SYSTOLIC BLOOD PRESSURE: 136 MMHG | BODY MASS INDEX: 29.57 KG/M2 | OXYGEN SATURATION: 100 % | DIASTOLIC BLOOD PRESSURE: 91 MMHG | TEMPERATURE: 97.4 F

## 2025-02-20 DIAGNOSIS — N20.1 URETEROLITHIASIS: Primary | ICD-10-CM

## 2025-02-20 DIAGNOSIS — R82.71 BACTERIURIA: ICD-10-CM

## 2025-02-20 LAB
ALBUMIN SERPL BCG-MCNC: 4.4 G/DL (ref 3.5–5)
ALP SERPL-CCNC: 58 U/L (ref 34–104)
ALT SERPL W P-5'-P-CCNC: 10 U/L (ref 7–52)
ANION GAP SERPL CALCULATED.3IONS-SCNC: 6 MMOL/L (ref 4–13)
AST SERPL W P-5'-P-CCNC: 10 U/L (ref 13–39)
BACTERIA UR QL AUTO: ABNORMAL /HPF
BASOPHILS # BLD AUTO: 0.05 THOUSANDS/ΜL (ref 0–0.1)
BASOPHILS NFR BLD AUTO: 0 % (ref 0–1)
BILIRUB SERPL-MCNC: 0.28 MG/DL (ref 0.2–1)
BILIRUB UR QL STRIP: ABNORMAL
BUN SERPL-MCNC: 13 MG/DL (ref 5–25)
CALCIUM SERPL-MCNC: 9.1 MG/DL (ref 8.4–10.2)
CHLORIDE SERPL-SCNC: 109 MMOL/L (ref 96–108)
CLARITY UR: ABNORMAL
CO2 SERPL-SCNC: 23 MMOL/L (ref 21–32)
COLOR UR: YELLOW
CREAT SERPL-MCNC: 1.07 MG/DL (ref 0.6–1.3)
EOSINOPHIL # BLD AUTO: 0.13 THOUSAND/ΜL (ref 0–0.61)
EOSINOPHIL NFR BLD AUTO: 1 % (ref 0–6)
ERYTHROCYTE [DISTWIDTH] IN BLOOD BY AUTOMATED COUNT: 14.4 % (ref 11.6–15.1)
EXT PREGNANCY TEST URINE: NEGATIVE
EXT. CONTROL: NORMAL
GFR SERPL CREATININE-BSD FRML MDRD: 65 ML/MIN/1.73SQ M
GLUCOSE SERPL-MCNC: 110 MG/DL (ref 65–140)
GLUCOSE UR STRIP-MCNC: NEGATIVE MG/DL
HCT VFR BLD AUTO: 46.1 % (ref 34.8–46.1)
HGB BLD-MCNC: 15 G/DL (ref 11.5–15.4)
HGB UR QL STRIP.AUTO: ABNORMAL
IMM GRANULOCYTES # BLD AUTO: 0.04 THOUSAND/UL (ref 0–0.2)
IMM GRANULOCYTES NFR BLD AUTO: 0 % (ref 0–2)
KETONES UR STRIP-MCNC: NEGATIVE MG/DL
LEUKOCYTE ESTERASE UR QL STRIP: ABNORMAL
LIPASE SERPL-CCNC: 44 U/L (ref 11–82)
LYMPHOCYTES # BLD AUTO: 1.37 THOUSANDS/ΜL (ref 0.6–4.47)
LYMPHOCYTES NFR BLD AUTO: 11 % (ref 14–44)
MCH RBC QN AUTO: 27.6 PG (ref 26.8–34.3)
MCHC RBC AUTO-ENTMCNC: 32.5 G/DL (ref 31.4–37.4)
MCV RBC AUTO: 85 FL (ref 82–98)
MONOCYTES # BLD AUTO: 0.66 THOUSAND/ΜL (ref 0.17–1.22)
MONOCYTES NFR BLD AUTO: 5 % (ref 4–12)
NEUTROPHILS # BLD AUTO: 10.01 THOUSANDS/ΜL (ref 1.85–7.62)
NEUTS SEG NFR BLD AUTO: 83 % (ref 43–75)
NITRITE UR QL STRIP: NEGATIVE
NON-SQ EPI CELLS URNS QL MICRO: ABNORMAL /HPF
NRBC BLD AUTO-RTO: 0 /100 WBCS
PH UR STRIP.AUTO: 7 [PH]
PLATELET # BLD AUTO: 331 THOUSANDS/UL (ref 149–390)
PMV BLD AUTO: 9.7 FL (ref 8.9–12.7)
POTASSIUM SERPL-SCNC: 3.8 MMOL/L (ref 3.5–5.3)
PROT SERPL-MCNC: 7.7 G/DL (ref 6.4–8.4)
PROT UR STRIP-MCNC: ABNORMAL MG/DL
RBC # BLD AUTO: 5.44 MILLION/UL (ref 3.81–5.12)
RBC #/AREA URNS AUTO: ABNORMAL /HPF
SODIUM SERPL-SCNC: 138 MMOL/L (ref 135–147)
SP GR UR STRIP.AUTO: 1.02 (ref 1–1.03)
UROBILINOGEN UR QL STRIP.AUTO: 0.2 E.U./DL
WBC # BLD AUTO: 12.26 THOUSAND/UL (ref 4.31–10.16)
WBC #/AREA URNS AUTO: ABNORMAL /HPF

## 2025-02-20 PROCEDURE — 74176 CT ABD & PELVIS W/O CONTRAST: CPT

## 2025-02-20 PROCEDURE — 96376 TX/PRO/DX INJ SAME DRUG ADON: CPT

## 2025-02-20 PROCEDURE — 99285 EMERGENCY DEPT VISIT HI MDM: CPT | Performed by: EMERGENCY MEDICINE

## 2025-02-20 PROCEDURE — 81025 URINE PREGNANCY TEST: CPT | Performed by: EMERGENCY MEDICINE

## 2025-02-20 PROCEDURE — 81001 URINALYSIS AUTO W/SCOPE: CPT | Performed by: EMERGENCY MEDICINE

## 2025-02-20 PROCEDURE — 85025 COMPLETE CBC W/AUTO DIFF WBC: CPT | Performed by: EMERGENCY MEDICINE

## 2025-02-20 PROCEDURE — 96375 TX/PRO/DX INJ NEW DRUG ADDON: CPT

## 2025-02-20 PROCEDURE — 80053 COMPREHEN METABOLIC PANEL: CPT | Performed by: EMERGENCY MEDICINE

## 2025-02-20 PROCEDURE — 96365 THER/PROPH/DIAG IV INF INIT: CPT

## 2025-02-20 PROCEDURE — 36415 COLL VENOUS BLD VENIPUNCTURE: CPT | Performed by: EMERGENCY MEDICINE

## 2025-02-20 PROCEDURE — 96367 TX/PROPH/DG ADDL SEQ IV INF: CPT

## 2025-02-20 PROCEDURE — 83690 ASSAY OF LIPASE: CPT | Performed by: EMERGENCY MEDICINE

## 2025-02-20 PROCEDURE — 99284 EMERGENCY DEPT VISIT MOD MDM: CPT

## 2025-02-20 RX ORDER — CEFAZOLIN SODIUM 1 G/50ML
1000 SOLUTION INTRAVENOUS ONCE
Status: COMPLETED | OUTPATIENT
Start: 2025-02-20 | End: 2025-02-20

## 2025-02-20 RX ORDER — KETOROLAC TROMETHAMINE 30 MG/ML
30 INJECTION, SOLUTION INTRAMUSCULAR; INTRAVENOUS ONCE
Status: COMPLETED | OUTPATIENT
Start: 2025-02-20 | End: 2025-02-20

## 2025-02-20 RX ORDER — CEPHALEXIN 500 MG/1
500 CAPSULE ORAL EVERY 12 HOURS SCHEDULED
Qty: 6 CAPSULE | Refills: 0 | Status: SHIPPED | OUTPATIENT
Start: 2025-02-20 | End: 2025-02-23

## 2025-02-20 RX ORDER — FENTANYL CITRATE 50 UG/ML
50 INJECTION, SOLUTION INTRAMUSCULAR; INTRAVENOUS ONCE
Refills: 0 | Status: COMPLETED | OUTPATIENT
Start: 2025-02-20 | End: 2025-02-20

## 2025-02-20 RX ORDER — TAMSULOSIN HYDROCHLORIDE 0.4 MG/1
0.4 CAPSULE ORAL
Qty: 5 CAPSULE | Refills: 0 | Status: SHIPPED | OUTPATIENT
Start: 2025-02-20 | End: 2025-02-25

## 2025-02-20 RX ORDER — IBUPROFEN 600 MG/1
600 TABLET, FILM COATED ORAL EVERY 8 HOURS PRN
Qty: 30 TABLET | Refills: 0 | Status: SHIPPED | OUTPATIENT
Start: 2025-02-20 | End: 2025-02-20

## 2025-02-20 RX ORDER — OXYCODONE AND ACETAMINOPHEN 5; 325 MG/1; MG/1
1 TABLET ORAL EVERY 4 HOURS PRN
Qty: 15 TABLET | Refills: 0 | Status: SHIPPED | OUTPATIENT
Start: 2025-02-20 | End: 2025-03-02

## 2025-02-20 RX ORDER — IBUPROFEN 600 MG/1
600 TABLET, FILM COATED ORAL EVERY 8 HOURS PRN
Qty: 30 TABLET | Refills: 0 | Status: SHIPPED | OUTPATIENT
Start: 2025-02-20

## 2025-02-20 RX ORDER — TAMSULOSIN HYDROCHLORIDE 0.4 MG/1
0.4 CAPSULE ORAL
Qty: 5 CAPSULE | Refills: 0 | Status: SHIPPED | OUTPATIENT
Start: 2025-02-20 | End: 2025-02-20

## 2025-02-20 RX ORDER — ONDANSETRON 2 MG/ML
4 INJECTION INTRAMUSCULAR; INTRAVENOUS ONCE
Status: COMPLETED | OUTPATIENT
Start: 2025-02-20 | End: 2025-02-20

## 2025-02-20 RX ORDER — CEPHALEXIN 500 MG/1
500 CAPSULE ORAL EVERY 12 HOURS SCHEDULED
Qty: 6 CAPSULE | Refills: 0 | Status: SHIPPED | OUTPATIENT
Start: 2025-02-20 | End: 2025-02-20

## 2025-02-20 RX ORDER — CIPROFLOXACIN 250 MG/1
1 TABLET, FILM COATED ORAL 2 TIMES DAILY
COMMUNITY
Start: 2025-01-30

## 2025-02-20 RX ADMIN — FENTANYL CITRATE 50 MCG: 0.05 INJECTION, SOLUTION INTRAMUSCULAR; INTRAVENOUS at 11:30

## 2025-02-20 RX ADMIN — KETOROLAC TROMETHAMINE 30 MG: 30 INJECTION, SOLUTION INTRAMUSCULAR; INTRAVENOUS at 11:29

## 2025-02-20 RX ADMIN — ONDANSETRON 4 MG: 2 INJECTION INTRAMUSCULAR; INTRAVENOUS at 11:29

## 2025-02-20 RX ADMIN — FENTANYL CITRATE 50 MCG: 0.05 INJECTION, SOLUTION INTRAMUSCULAR; INTRAVENOUS at 13:25

## 2025-02-20 RX ADMIN — SODIUM CHLORIDE, SODIUM LACTATE, POTASSIUM CHLORIDE, AND CALCIUM CHLORIDE 1000 ML: .6; .31; .03; .02 INJECTION, SOLUTION INTRAVENOUS at 11:31

## 2025-02-20 RX ADMIN — CEFAZOLIN SODIUM 1000 MG: 1 SOLUTION INTRAVENOUS at 14:47

## 2025-02-20 NOTE — TELEPHONE ENCOUNTER
----- Message from Diana Jaiyeola, MD sent at 2/19/2025 12:01 PM EST -----  Please call the patient with the results  Please let her know that her stool infectious tests are negative.  Her stool inflammation test was elevated.  This test is not specific and can be due to recent viral infections, medications or autoimmune disorders.  She is scheduled for endoscopy and colonoscopy with me in March.  If she is still having symptoms it is reasonable to look for a sooner date for her procedures given her abnormal stool testing.  Please see if there is a sooner date available with me.  If not please place the patient on a cancellation list.

## 2025-02-20 NOTE — TELEPHONE ENCOUNTER
Patient was calling to set up an appt to follow up after her ED visit today. They did another CT she currently has a 5 mm obstructing stone and was told to follow up with her urologist. She was previously seen by Evelin and is scheduled for a Cysto with Uriel on 3/11    Was able to squeeze the patient into a cancellation appt with Evelin on Monday at 2:20 pm    Can this please be reviewed if she is ok to see Evelin or if she needs to be scheduled with a doctor?

## 2025-02-20 NOTE — Clinical Note
Estrellita Lucero was seen and treated in our emergency department on 2/20/2025.                Diagnosis:     Estrellita  may return to work on return date.    She may return on this date: 02/22/2025         If you have any questions or concerns, please don't hesitate to call.      Amado Harris MD    ______________________________           _______________          _______________  Hospital Representative                              Date                                Time

## 2025-02-20 NOTE — ED PROVIDER NOTES
Time reflects when diagnosis was documented in both MDM as applicable and the Disposition within this note       Time User Action Codes Description Comment    2/20/2025  2:15 PM Amado Harris Add [N20.1] Ureterolithiasis     2/20/2025  2:15 PM Amado Harris Add [R82.71] Bacteriuria           ED Disposition       ED Disposition   Discharge    Condition   Stable    Date/Time   u Feb 20, 2025  2:15 PM    Comment   Estrellita Lucero discharge to home/self care.                   Assessment & Plan       Medical Decision Making  1055: Patient appears well, vital signs reviewed.  Benign abdominal exam.  Recent CT abdomen pelvis reviewed with patient.  Plan to complete basic labs including urinalysis.  Risk/benefits of reimaging were discussed with patient, decision made with to repeat imaging CT abdomen pelvis.  I will give analgesics for discomfort and reevaluate.    1400: CT and labs reviewed.  Pain well-controlled.  Discussed with urology for assistance with care.  Recommends Flomax, Percocet, ibuprofen, urine straining.  We will place on a 3-day course of antibiotics due to her previous history of IC, bacteriuria findings and mild leukocytosis.    Amount and/or Complexity of Data Reviewed  External Data Reviewed: labs, radiology and notes.     Details: CT abdomen pelvis 1/24/2025--1. Fluid-filled loops of small and large bowel may represent a mild enterocolitis. Correlate clinically.  2. 3 mm nonobstructing right renal calculus.       Labs: ordered.  Radiology: ordered.     Details: CT abdomen pelvis--5 mm right UVJ stone, right, perinephric stranding, hydronephrosis  Discussion of management or test interpretation with external provider(s): Urology Caden [facility]    Risk  Prescription drug management.             Medications   ceFAZolin (ANCEF) IVPB (premix in dextrose) 1,000 mg 50 mL (1,000 mg Intravenous New Bag 2/20/25 0317)   lactated ringers bolus 1,000 mL (0 mL Intravenous Stopped 2/20/25 1231)   ketorolac  (TORADOL) injection 30 mg (30 mg Intravenous Given 2/20/25 1129)   fentaNYL injection 50 mcg (50 mcg Intravenous Given 2/20/25 1130)   ondansetron (ZOFRAN) injection 4 mg (4 mg Intravenous Given 2/20/25 1129)   fentaNYL injection 50 mcg (50 mcg Intravenous Given 2/20/25 1325)       ED Risk Strat Scores                            SBIRT 22yo+      Flowsheet Row Most Recent Value   Initial Alcohol Screen: US AUDIT-C     1. How often do you have a drink containing alcohol? 0 Filed at: 02/20/2025 1059   2. How many drinks containing alcohol do you have on a typical day you are drinking?  0 Filed at: 02/20/2025 1059   3b. FEMALE Any Age, or MALE 65+: How often do you have 4 or more drinks on one occassion? 0 Filed at: 02/20/2025 1059   Audit-C Score 0 Filed at: 02/20/2025 1059   ERICK: How many times in the past year have you...    Used an illegal drug or used a prescription medication for non-medical reasons? Never Filed at: 02/20/2025 1059                            History of Present Illness       Chief Complaint   Patient presents with    Abdominal Pain     Patient presents to the ED with complaints of lower abdominal pain that radiates into her lower back for a few weeks. The patient reports seeing GI recently. Also reports history of IC and a kidney stone states that she is having an IC flare up at this time.        Past Medical History:   Diagnosis Date    Asthma     Depression     GERD (gastroesophageal reflux disease)     Hypertension     IC (interstitial cystitis)     Kidney stone 1/25/2025    Migraines     PCOS (polycystic ovarian syndrome)     Pulmonary embolism (HCC) 2009    Urinary retention     Urinary tract infection     Venous insufficiency       Past Surgical History:   Procedure Laterality Date    ADENOIDECTOMY      CHOLECYSTECTOMY      CYSTOSCOPY      HERNIA REPAIR      LAPAROSCOPY      TONSILLECTOMY        Family History   Problem Relation Age of Onset    Hyperlipidemia Mother     Diabetes Mother      Hypertension Mother     Osteoporosis Mother     Anxiety disorder Mother     Depression Mother     Arthritis Mother     Anxiety disorder Father     Depression Father     Asthma Father     Deep vein thrombosis Father     Pulmonary embolism Father     Venous thrombosis Father     Heart disease Father     Hyperlipidemia Father     Hypertension Father     COPD Father     Factor V Leiden deficiency Father     Stroke Father     Lung cancer Father     Cancer Father         Lung    Clotting disorder Father     Diabetes Father     Urolithiasis Father     Factor V Leiden deficiency Sister     Migraines Sister     Thyroid disease Sister     Polycystic ovary syndrome Sister     Diabetes Maternal Grandmother     Osteoporosis Maternal Grandmother     Hypertension Maternal Grandmother     Stroke Maternal Grandfather     Breast cancer additional onset Paternal Grandmother 89    Breast cancer Paternal Grandmother 70    Cancer Paternal Grandmother         Breast    Diabetes Paternal Grandmother     Heart attack Paternal Grandfather     Heart disease Paternal Grandfather     Stroke Paternal Grandfather     Sudden death Paternal Grandfather     Prostate cancer Paternal Uncle         mets    Cancer Paternal Uncle         Prostate, Lung, Brain, Bone, Stomach    Heart disease Paternal Uncle     Urolithiasis Paternal Uncle       Social History     Tobacco Use    Smoking status: Never    Smokeless tobacco: Never   Vaping Use    Vaping status: Never Used   Substance Use Topics    Alcohol use: Not Currently     Comment: May have a drink once every 5-6 months    Drug use: Never      E-Cigarette/Vaping    E-Cigarette Use Never User       E-Cigarette/Vaping Substances    Nicotine No     THC No     CBD No     Flavoring No     Other No     Unknown No       I have reviewed and agree with the history as documented.       History provided by:  Medical records and patient  Abdominal Pain  Pain location:  Suprapubic and RLQ  Pain quality: aching and  dull    Pain radiates to:  R flank  Pain severity:  Moderate  Onset quality:  Gradual  Duration:  1 day  Timing:  Intermittent  Progression:  Waxing and waning  Chronicity:  Recurrent  Context comment:  Patient with a history of interstitial cystitis, return of suprapubic right lower quadrant pain radiating to the right flank since last night.  Under the care of gastroenterology, plans for endoscopy and colonoscopy next week  Relieved by:  Nothing  Worsened by:  Nothing  Ineffective treatments:  None tried  Associated symptoms: dysuria, nausea and vomiting    Associated symptoms: no anorexia, no belching, no chest pain, no chills, no constipation, no cough, no diarrhea, no fatigue, no fever, no flatus, no hematemesis, no hematochezia, no hematuria, no melena, no shortness of breath, no sore throat, no vaginal bleeding and no vaginal discharge        Review of Systems   Constitutional:  Negative for chills, fatigue and fever.   HENT:  Negative for ear discharge, ear pain, rhinorrhea and sore throat.    Eyes:  Negative for pain and visual disturbance.   Respiratory:  Negative for cough and shortness of breath.    Cardiovascular:  Negative for chest pain and palpitations.   Gastrointestinal:  Positive for abdominal pain, nausea and vomiting. Negative for abdominal distention, anal bleeding, anorexia, blood in stool, constipation, diarrhea, flatus, hematemesis, hematochezia, melena and rectal pain.   Endocrine: Negative for polydipsia, polyphagia and polyuria.   Genitourinary:  Positive for dysuria. Negative for decreased urine volume, difficulty urinating, enuresis, flank pain, frequency, genital sores, hematuria, menstrual problem, pelvic pain, urgency, vaginal bleeding, vaginal discharge and vaginal pain.   Musculoskeletal:  Negative for arthralgias and back pain.   Skin:  Negative for color change and rash.   Allergic/Immunologic: Negative for immunocompromised state.   Neurological:  Negative for dizziness,  seizures, syncope, weakness and headaches.   Psychiatric/Behavioral:  Negative for confusion and self-injury. The patient is not nervous/anxious.    All other systems reviewed and are negative.          Objective       ED Triage Vitals [02/20/25 1057]   Temperature Pulse Blood Pressure Respirations SpO2 Patient Position - Orthostatic VS   (!) 97.4 °F (36.3 °C) 79 136/91 18 100 % Sitting      Temp Source Heart Rate Source BP Location FiO2 (%) Pain Score    Temporal Monitor Left arm -- 10 - Worst Possible Pain      Vitals      Date and Time Temp Pulse SpO2 Resp BP Pain Score FACES Pain Rating User   02/20/25 1325 -- -- -- -- -- 7 -- BF   02/20/25 1302 -- -- -- -- -- 5 -- KK   02/20/25 1130 -- -- -- -- -- 10 - Worst Possible Pain --    02/20/25 1129 -- -- -- -- -- 10 - Worst Possible Pain --    02/20/25 1057 97.4 °F (36.3 °C) 79 100 % 18 136/91 10 - Worst Possible Pain -- RR            Physical Exam  Vitals and nursing note reviewed.   Constitutional:       General: She is not in acute distress.     Appearance: Normal appearance. She is not ill-appearing, toxic-appearing or diaphoretic.   HENT:      Head: Normocephalic and atraumatic.      Nose: Nose normal. No congestion or rhinorrhea.      Mouth/Throat:      Mouth: Mucous membranes are moist.      Pharynx: Oropharynx is clear. No oropharyngeal exudate or posterior oropharyngeal erythema.   Eyes:      General:         Right eye: No discharge.         Left eye: No discharge.   Cardiovascular:      Rate and Rhythm: Normal rate and regular rhythm.      Pulses: Normal pulses.      Heart sounds: Normal heart sounds. No murmur heard.     No gallop.   Pulmonary:      Effort: Pulmonary effort is normal. No respiratory distress.      Breath sounds: Normal breath sounds. No stridor. No wheezing, rhonchi or rales.   Chest:      Chest wall: No tenderness.   Abdominal:      General: Bowel sounds are normal. There is no distension.      Palpations: Abdomen is soft. There is no  mass.      Tenderness: There is abdominal tenderness. There is no right CVA tenderness, left CVA tenderness, guarding or rebound.      Hernia: No hernia is present.      Comments: Mild, inconsistent abdominal tenderness to the suprapubic and right lower quadrant regions   Musculoskeletal:         General: Normal range of motion.      Cervical back: Normal range of motion and neck supple.   Skin:     General: Skin is warm and dry.      Capillary Refill: Capillary refill takes less than 2 seconds.   Neurological:      General: No focal deficit present.      Mental Status: She is alert and oriented to person, place, and time.      Cranial Nerves: No cranial nerve deficit.      Sensory: No sensory deficit.      Motor: No weakness.      Coordination: Coordination normal.      Gait: Gait normal.      Deep Tendon Reflexes: Reflexes normal.   Psychiatric:         Mood and Affect: Mood normal.         Behavior: Behavior normal.         Thought Content: Thought content normal.         Judgment: Judgment normal.         Results Reviewed       Procedure Component Value Units Date/Time    Comprehensive metabolic panel [[id number]]  (Abnormal) Collected: 02/20/25 1128    Lab Status: Final result Specimen: Blood from Arm, Left Updated: 02/20/25 1158     Sodium 138 mmol/L      Potassium 3.8 mmol/L      Chloride 109 mmol/L      CO2 23 mmol/L      ANION GAP 6 mmol/L      BUN 13 mg/dL      Creatinine 1.07 mg/dL      Glucose 110 mg/dL      Calcium 9.1 mg/dL      AST 10 U/L      ALT 10 U/L      Alkaline Phosphatase 58 U/L      Total Protein 7.7 g/dL      Albumin 4.4 g/dL      Total Bilirubin 0.28 mg/dL      eGFR 65 ml/min/1.73sq m     Narrative:      National Kid[employer] Foundation guidelines for Chronic Kidney Disease (CKD):     Stage 1 with normal or high GFR (GFR > 90 mL/min/1.73 square meters)    Stage 2 Mild CKD (GFR = 60-89 mL/min/1.73 square meters)    Stage 3A Moderate CKD (GFR = 45-59 mL/min/1.73 square meters)    Stage 3B  Moderate CKD (GFR = 30-44 mL/min/1.73 square meters)    Stage 4 Severe CKD (GFR = 15-29 mL/min/1.73 square meters)    Stage 5 End Stage CKD (GFR <15 mL/min/1.73 square meters)  Note: GFR calculation is accurate only with a steady state creatinine    Lipase [[id number]]  (Normal) Collected: 02/20/25 1128    Lab Status: Final result Specimen: Blood from Arm, Left Updated: 02/20/25 1158     Lipase 44 u/L     Urine Microscopic [[id number]]  (Abnormal) Collected: 02/20/25 1132    Lab Status: Final result Specimen: Urine, Clean Catch Updated: 02/20/25 1153     RBC, UA 10-20 /hpf      WBC, UA 2-4 /hpf      Epithelial Cells Occasional /hpf      Bacteria, UA Moderate /hpf     UA w Reflex to Microscopic w Reflex to Culture [[id number]]  (Abnormal) Collected: 02/20/25 1132    Lab Status: Final result Specimen: Urine, Clean Catch Updated: 02/20/25 1147     Color, UA Yellow     Clarity, UA Cloudy     Specific Gravity, UA 1.025     pH, UA 7.0     Leukocytes, UA Small     Nitrite, UA Negative     Protein, UA Trace mg/dl      Glucose, UA Negative mg/dl      Ketones, UA Negative mg/dl      Urobilinogen, UA 0.2 E.U./dl      Bilirubin, UA Small     Occult Blood, UA Large    POCT pregnancy, urine [[id number]]  (Normal) Collected: 02/20/25 1140    Lab Status: Final result Updated: 02/20/25 1147     EXT Preg Test, Ur Negative     Control Valid    CBC and differential [[id number]]  (Abnormal) Collected: 02/20/25 1128    Lab Status: Final result Specimen: Blood from Arm, Left Updated: 02/20/25 1138     WBC 12.26 Thousand/uL      RBC 5.44 Million/uL      Hemoglobin 15.0 g/dL      Hematocrit 46.1 %      MCV 85 fL      MCH 27.6 pg      MCHC 32.5 g/dL      RDW 14.4 %      MPV 9.7 fL      Platelets 331 Thousands/uL      nRBC 0 /100 WBCs      Segmented % 83 %      Immature Grans % 0 %      Lymphocytes % 11 %      Monocytes % 5 %      Eosinophils Relative 1 %      Basophils Relative 0 %      Absolute Neutrophils 10.01 Thousands/µL      Absolute  Immature Grans 0.04 Thousand/uL      Absolute Lymphocytes 1.37 Thousands/µL      Absolute Monocytes 0.66 Thousand/µL      Eosinophils Absolute 0.13 Thousand/µL      Basophils Absolute 0.05 Thousands/µL             CT renal stone study abdomen pelvis wo contrast   Final Interpretation by Taisha Watst MD (02/20 4136)         1. Obstructing stone measuring 5 mm at the right vesicoureteral junction causing mild right hydroureteronephrosis. Mild right perinephric and periureteral stranding; clinical correlation for symptoms of pyelonephritis is recommended.      2. Additional findings as detailed in the body of the report.            The study was marked in EPIC for immediate notification.         Workstation performed: [id number]             Procedures    ED Medication and Procedure Management   Prior to Admission Medications   Prescriptions Last Dose Informant Patient Reported? Taking?   Meth-Hyo-M Bl-Na Phos-Ph Sal (Urelle) 81 MG TABS   Yes No   Sig: Take 1 tablet by mouth if needed   Wegovy 2.4 MG/0.75ML   Yes No   Sig: INJECT 2.4 MG UNDER THE SKIN EVERY 7 DAYS.   acetaminophen (TYLENOL) 325 mg tablet   No No   Sig: Take 2 tablets (650 mg total) by mouth every 6 (six) hours as needed for mild pain   albuterol (PROVENTIL HFA,VENTOLIN HFA) 90 mcg/act inhaler   Yes No   Sig: TAKE 2 PUFFS BY MOUTH EVERY 6 HOURS AS NEEDED FOR WHEEZE OR FOR SHORTNESS OF BREATH   amitriptyline (ELAVIL) 50 mg tablet   Yes No   Sig: take 1 tablet by mouth every day at night   busPIRone (BUSPAR) 5 mg tablet   Yes No   Sig: Take 5 mg by mouth 2 (two) times a day   famotidine (PEPCID) 40 MG tablet   No No   Sig: Take 1 tablet (40 mg total) by mouth daily at bedtime   hydrOXYzine HCL (ATARAX) 10 mg tablet   Yes No   Sig: Take 10 mg by mouth daily at bedtime   ibuprofen (MOTRIN) 200 mg tablet   No No   Sig: Take 3 tablets (600 mg total) by mouth every 6 (six) hours as needed for moderate pain   ibuprofen (MOTRIN) 800 mg tablet   No No   Sig: Take  1 tablet (800 mg total) by mouth 3 (three) times a day   Patient not taking: Reported on 2/12/2025   lisinopril (ZESTRIL) 20 mg tablet   Yes No   Sig: Take 20 mg by mouth daily   loratadine-pseudoephedrine (CLARITIN-D 24-HOUR)  mg per 24 hr tablet   Yes No   Sig: Take 1 tablet by mouth daily   metFORMIN (GLUCOPHAGE) 1000 MG tablet   Yes No   Sig: Take 1,000 mg by mouth daily with breakfast   montelukast (SINGULAIR) 10 mg tablet  Self Yes No   Sig: Take 10 mg by mouth daily   polyethylene glycol (GaviLyte-C) 4000 mL solution   No No   Sig: As instructed by the GI office   tamsulosin (FLOMAX) 0.4 mg   No No   Sig: Take 1 capsule (0.4 mg total) by mouth daily with dinner   topiramate (TOPAMAX) 100 mg tablet   No No   Sig: TAKE 1 TABLET BY MOUTH TWICE A DAY      Facility-Administered Medications: None     Patient's Medications   Discharge Prescriptions    CEPHALEXIN (KEFLEX) 500 MG CAPSULE    Take 1 capsule (500 mg total) by mouth every 12 (twelve) hours for 3 days       Start Date: 2/20/2025 End Date: 2/23/2025       Order Dose: 500 mg       Quantity: 6 capsule    Refills: 0    IBUPROFEN (MOTRIN) 600 MG TABLET    Take 1 tablet (600 mg total) by mouth every 8 (eight) hours as needed for moderate pain       Start Date: 2/20/2025 End Date: --       Order Dose: 600 mg       Quantity: 30 tablet    Refills: 0    OXYCODONE-ACETAMINOPHEN (PERCOCET) 5-325 MG PER TABLET    Take 1 tablet by mouth every 4 (four) hours as needed for moderate pain for up to 10 days Max Daily Amount: 6 tablets       Start Date: 2/20/2025 End Date: 3/2/2025       Order Dose: 1 tablet       Quantity: 15 tablet    Refills: 0    TAMSULOSIN (FLOMAX) 0.4 MG    Take 1 capsule (0.4 mg total) by mouth daily with dinner for 5 days       Start Date: 2/20/2025 End Date: 2/25/2025       Order Dose: 0.4 mg       Quantity: 5 capsule    Refills: 0     No discharge procedures on file.  ED SEPSIS DOCUMENTATION   Time reflects when diagnosis was documented in  both MDM as applicable and the Disposition within this note       Time User Action Codes Description Comment    2/20/2025  2:15 PM Amado Harris Add [N20.1] Ureterolithiasis     2/20/2025  2:15 PM Amado Harris Add [R82.71] Bacteriuria                  Amado Harris MD  02/20/25 3860

## 2025-02-20 NOTE — TELEPHONE ENCOUNTER
Called the patient to relay her stool test results and recommendations to try and schedule for a sooner procedure if she is having symptoms. Patient reports that she is having intense abdominal pain currently, has been vomiting, and is headed to the [facility] ER in [place]. Patient is agreeable to have her procedure scheduled sooner due to ongoing symptoms. Informed the patient I will update Dr. Jaiyeola and forward to the scheduling team to find a sooner appointment. Patient thanked us.

## 2025-02-21 PROBLEM — N20.1 RIGHT URETERAL CALCULUS: Status: ACTIVE | Noted: 2025-02-21

## 2025-02-21 NOTE — ASSESSMENT & PLAN NOTE
Known history renal calculi,  Seen in ED 2/20/25-- CT A&P finds 5mm obstructing stone at right UVJ.  Mild right hydronephrosis, perinephric and periureteral stranding.   Continue flomax  Strain all urine  ED precautions  Consider alternative to Topamax for migraines  Reschedule office cystoscopy for now, patient agrees    Orders:    Stone analysis; Future    XR abdomen 1 view kub; Future  Discussed options for management including continued medical expulsive therapy vs surgical, definitve management.  Recommend patient to continue flomax 0.4mg daily, strain all urine, adequate hydration with water to assist in flushing out the urinary system and passage of stone.      We also discussed Topamax use and kidney stones.  States she has been on Topamax for years.  No prior abdominal imaging for review.  Unclear if stones are recent development vs chronic without documented images.  She will consider discussing alternate therapy with provider.  At this time, she will continue to hydrate well with water, She will follow lox oxalate kidney stone diet.  Literature given    She will follow up in 3-4 weeks with KUB prior to appointment.Tentative follow up from prior visit on 3/11 for office cysto due to issues with IC.  She would like to reschedule office cystoscopy until after kidney stone passes.  She will speak up if any concerns and seek ED precautions with fever, chills, flank pain, inability to void, nausea, vomiting or other new, worsening symptoms. All questions were answered, she understands and agrees with the plan.

## 2025-02-21 NOTE — TELEPHONE ENCOUNTER
The patient called back to inquire if there were any sooner available appointments per the provider's request; however, there were no sooner options. I placed the patient on the waitlist.

## 2025-02-21 NOTE — PROGRESS NOTES
Name: Estrellita Lucero      : 1985      MRN: [id number]  Encounter Provider: MALACHI Tejada  Encounter Date: 2025   Encounter department: [facility] UROLOGY [place]  :  Assessment & Plan  Right ureteral calculus  Known history renal calculi,  Seen in ED 25-- CT A&P finds 5mm obstructing stone at right UVJ.  Mild right hydronephrosis, perinephric and periureteral stranding.   Continue flomax  Strain all urine  ED precautions  Consider alternative to Topamax for migraines  Reschedule office cystoscopy for now, patient agrees    Orders:    Stone analysis; Future    XR abdomen 1 view kub; Future  Discussed options for management including continued medical expulsive therapy vs surgical, definitve management.  Recommend patient to continue flomax 0.4mg daily, strain all urine, adequate hydration with water to assist in flushing out the urinary system and passage of stone.      We also discussed Topamax use and kidney stones.  States she has been on Topamax for years.  No prior abdominal imaging for review.  Unclear if stones are recent development vs chronic without documented images.  She will consider discussing alternate therapy with provider.  At this time, she will continue to hydrate well with water, She will follow lox oxalate kidney stone diet.  Literature given    She will follow up in 3-4 weeks with KUB prior to appointment.Tentative follow up from prior visit on 3/11 for office cysto due to issues with IC.  She would like to reschedule office cystoscopy until after kidney stone passes.  She will speak up if any concerns and seek ED precautions with fever, chills, flank pain, inability to void, nausea, vomiting or other new, worsening symptoms. All questions were answered, she understands and agrees with the plan.     Ureterolithiasis    Orders:    tamsulosin (FLOMAX) 0.4 mg; Take 1 capsule (0.4 mg total) by mouth daily with dinner for 28 days        History of Present  Illness   Estrellita Lucero is a 39 y.o. female PMH pyelonephritis, IC, UTI, kidney stones. Who presents for follow-up from the emergency department 2/20/2025. CT A&P finds 5mm obstructing stone at right UVJ.  Mild right hydronephrosis, perinephric and periureteral stranding. Discharged to home with analgesics, antiemetics and flomax  She was recently seen on 1/28 follow-up of nonobstructing stone, as well as IC discussion.  At this visit she was to follow irritative voiding diet, continue marshmallow root tea, aloe vera gel Follow-Up for Office Cystoscopy.    Continues with her typical dysuria, occasional flank pain.  Using ibuprofen for pain. No recent nausea or vomiting.  No fever or chills.      Review of Systems   Constitutional: Negative.  Negative for chills and fever.   HENT: Negative.     Eyes: Negative.    Respiratory: Negative.  Negative for chest tightness and shortness of breath.    Cardiovascular: Negative.    Gastrointestinal: Negative.  Negative for abdominal distention, constipation, diarrhea, nausea and vomiting.   Endocrine: Negative.    Genitourinary:  Positive for dysuria and flank pain. Negative for difficulty urinating, frequency, hematuria and urgency.   Musculoskeletal:  Negative for back pain and neck pain.   Skin: Negative.    Neurological: Negative.  Negative for dizziness and headaches.   Psychiatric/Behavioral: Negative.  Negative for agitation and behavioral problems.           Objective   There were no vitals taken for this visit.    Physical Exam  Vitals reviewed.   Constitutional:       General: She is not in acute distress.  HENT:      Head: Normocephalic and atraumatic.      Right Ear: External ear normal.      Left Ear: External ear normal.      Nose: Nose normal.   Eyes:      Extraocular Movements: Extraocular movements intact.      Pupils: Pupils are equal, round, and reactive to light.   Cardiovascular:      Rate and Rhythm: Normal rate.   Pulmonary:      Effort: Pulmonary  "effort is normal. No respiratory distress.   Abdominal:      Palpations: Abdomen is soft.      Tenderness: There is abdominal tenderness. There is left CVA tenderness. There is no right CVA tenderness or guarding.      Comments: Left CVA discomfort in setting of right ureteral calculus   Musculoskeletal:         General: Normal range of motion.      Cervical back: Normal range of motion and neck supple.   Skin:     General: Skin is warm and dry.   Neurological:      General: No focal deficit present.      Mental Status: She is alert and oriented to person, place, and time. Mental status is at baseline.   Psychiatric:         Mood and Affect: Mood normal.         Behavior: Behavior normal.         Thought Content: Thought content normal.         Judgment: Judgment normal.          Results   No results found for: \"PSA\"  Lab Results   Component Value Date    CALCIUM 9.1 02/20/2025    K 3.8 02/20/2025    CO2 23 02/20/2025     (H) 02/20/2025    BUN 13 02/20/2025    CREATININE 1.07 02/20/2025     Lab Results   Component Value Date    WBC 12.26 (H) 02/20/2025    HGB 15.0 02/20/2025    HCT 46.1 02/20/2025    MCV 85 02/20/2025     02/20/2025       Office Urine Dip  No results found for this or any previous visit (from the past hour).      "

## 2025-02-24 ENCOUNTER — TELEPHONE (OUTPATIENT)
Dept: GASTROENTEROLOGY | Facility: CLINIC | Age: 40
End: 2025-02-24

## 2025-02-24 ENCOUNTER — OFFICE VISIT (OUTPATIENT)
Dept: UROLOGY | Facility: CLINIC | Age: 40
End: 2025-02-24
Payer: COMMERCIAL

## 2025-02-24 VITALS
WEIGHT: 215 LBS | TEMPERATURE: 98 F | OXYGEN SATURATION: 100 % | HEIGHT: 71 IN | HEART RATE: 91 BPM | SYSTOLIC BLOOD PRESSURE: 120 MMHG | BODY MASS INDEX: 30.1 KG/M2 | DIASTOLIC BLOOD PRESSURE: 80 MMHG

## 2025-02-24 DIAGNOSIS — N20.1 URETEROLITHIASIS: ICD-10-CM

## 2025-02-24 DIAGNOSIS — N20.1 RIGHT URETERAL CALCULUS: Primary | ICD-10-CM

## 2025-02-24 PROCEDURE — 82360 CALCULUS ASSAY QUANT: CPT

## 2025-02-24 PROCEDURE — 99214 OFFICE O/P EST MOD 30 MIN: CPT

## 2025-02-24 RX ORDER — TAMSULOSIN HYDROCHLORIDE 0.4 MG/1
0.4 CAPSULE ORAL
Qty: 5 CAPSULE | Refills: 0 | Status: SHIPPED | OUTPATIENT
Start: 2025-02-24 | End: 2025-02-24

## 2025-02-24 RX ORDER — TAMSULOSIN HYDROCHLORIDE 0.4 MG/1
0.4 CAPSULE ORAL
Qty: 28 CAPSULE | Refills: 0 | Status: SHIPPED | OUTPATIENT
Start: 2025-02-24 | End: 2025-03-24

## 2025-02-24 NOTE — TELEPHONE ENCOUNTER
Called the patient to try schedule at [facility], unfortunately patient took Wegovy yesterday, needs 7 days hold.

## 2025-02-24 NOTE — PATIENT INSTRUCTIONS
Increase water,   Low oxalate kidney stone diet  Follow up 3 weeks with KUB prior to appt  Ed if any fever, chills, nausea, vomiting  Strain all urine

## 2025-02-26 LAB
APOB+LDLR+PCSK9 GENE MUT ANL BLD/T: NOT DETECTED
BRCA1+BRCA2 DEL+DUP + FULL MUT ANL BLD/T: NOT DETECTED
MLH1+MSH2+MSH6+PMS2 GN DEL+DUP+FUL M: NOT DETECTED

## 2025-03-04 LAB
COLOR STONE: NORMAL
COM MFR STONE: 10 %
COMMENT-STONE3: NORMAL
COMPOSITION: NORMAL
HYDROXYAPATITE 24H ENGDIFF UR: 90 %
LABORATORY COMMENT REPORT: NORMAL
PHOTO: NORMAL
SIZE STONE: NORMAL MM
SPEC SOURCE SUBJ: NORMAL
STONE ANALYSIS-IMP: NORMAL
WT STONE: 19 MG

## 2025-03-05 NOTE — TELEPHONE ENCOUNTER
Patient called in to speak with Reny to follow up on possibly being scheduled sooner with Dr. Corbett for the EGD and COL procedures. Transferred the patient back to [place] for further assistance.

## 2025-03-07 DIAGNOSIS — K58.2 IRRITABLE BOWEL SYNDROME WITH ALTERNATING BOWEL HABITS: ICD-10-CM

## 2025-03-07 DIAGNOSIS — R11.0 NAUSEA: ICD-10-CM

## 2025-03-07 RX ORDER — FAMOTIDINE 40 MG/1
40 TABLET, FILM COATED ORAL
Qty: 90 TABLET | Refills: 1 | Status: SHIPPED | OUTPATIENT
Start: 2025-03-07

## 2025-03-12 ENCOUNTER — ANESTHESIA EVENT (OUTPATIENT)
Dept: ANESTHESIOLOGY | Facility: HOSPITAL | Age: 40
End: 2025-03-12

## 2025-03-12 ENCOUNTER — ANESTHESIA (OUTPATIENT)
Dept: ANESTHESIOLOGY | Facility: HOSPITAL | Age: 40
End: 2025-03-12

## 2025-03-12 ENCOUNTER — TELEPHONE (OUTPATIENT)
Dept: GASTROENTEROLOGY | Facility: CLINIC | Age: 40
End: 2025-03-12

## 2025-03-12 NOTE — TELEPHONE ENCOUNTER
Called and spoke to patient to confirm procedure for 03/19/2025 with Dr. Jaiyeola at [facility]. Patient has confirmed and did hold Wegovy 7 days prior to procedure

## 2025-03-16 NOTE — PROGRESS NOTES
Name: Estrellita Lucero      : 1985      MRN: [id number]  Encounter Provider: MALACHI Tejada  Encounter Date: 3/24/2025   Encounter department: [facility] UROLOGY [place]  :  Assessment & Plan  Right ureteral calculus         Interstitial cystitis    CT A&P-- 5mm obstructing stone at right UVJ. Mild right hydronephrosis, perinephric and periureteral stranding.  KUB no identified calculi  Persistent flank pain, nausea  Continue irritative voiding diet  Low oxalate kidney stone diet, add lemon daily  Ct renal stone protocol this week.  Follow-up 2-3 weeks for CT results.  Will call if retained stone         We also discussed the passage of her kidney stone and its analysis including hydroxyapatite.  However, she feels much bother and feels as if stone is still present.  Which can form in alkaline urine recommend adequate hydration with water to produce pale yellow urine, low oxalate kidney stone diet adding lemon to her diet on a daily basis to aid in acidifying the urine.    Patient with persisten pain, nausea, continues to use Topamax.  No dysuria, resolved hematuria.  Risks benefits of retained stone discussed.  At this time, we will schedule ct stone protocol, she will continue flomax. Hydration with water, strain all urine.  Ed precautions were reviewed.  Follow-up in 2 weeks with any provider.  She understands we will call her with results.  We did review cystoscopy, lithotripsy along with risks, benefits, goals of intervention.  If shows retained stone, she is agreeable to move forward with surgery.      IC not flaring up at this time, will continue to delay previously scheduled cysto if not bother some.  She understands if it remains under behavioral and dietary control, she does not have to pursue this route of testing.    Flank pain    Orders:    CT renal stone study abdomen pelvis wo contrast; Future        History of Present Illness   Estrellita Lucero is a 39 y.o. female PMH  asthma, interstitial cystitis, migraines, UTI. she has been on Topamax for several years for migraines. we discussed at last visit.  Previously seen on 2/24 as follow up from ED.  CT A&P-- 5mm obstructing stone at right UVJ. Mild right hydronephrosis, perinephric and periureteral stranding.  she opted for MET and follow up with KUB one hour prior to appointment.    Previously, an office cystoscopy scheduled for further workup of her issues with IC.  She has opted to delay this until passage of kidney stone.    Stone analysis 2/24 showing calcium oxalate 10 and hydroxapatite 90.  KUB obtained prior to appointment. No overt identification of remaining calculi. However, states pain is persistent with flank pain, nausea.no vomiting.  Pain controlled with ibuprofen 600mg as needed.   Voiding      Review of Systems   Constitutional: Negative.  Negative for chills and fever.   HENT: Negative.     Eyes: Negative.    Respiratory: Negative.  Negative for chest tightness and shortness of breath.    Cardiovascular: Negative.    Gastrointestinal: Negative.  Negative for nausea and vomiting.   Endocrine: Negative.    Genitourinary:  Positive for flank pain and hematuria. Negative for difficulty urinating, dysuria, frequency and urgency.   Musculoskeletal:  Negative for back pain and neck pain.   Skin: Negative.    Neurological: Negative.  Negative for dizziness and headaches.   Psychiatric/Behavioral: Negative.  Negative for agitation and behavioral problems.           Objective   There were no vitals taken for this visit.    Physical Exam  Vitals reviewed.   Constitutional:       General: She is not in acute distress.  HENT:      Head: Normocephalic and atraumatic.      Right Ear: External ear normal.      Left Ear: External ear normal.      Nose: Nose normal.   Eyes:      Extraocular Movements: Extraocular movements intact.      Pupils: Pupils are equal, round, and reactive to light.   Cardiovascular:      Rate and Rhythm:  "Normal rate.   Pulmonary:      Effort: Pulmonary effort is normal. No respiratory distress.   Abdominal:      Palpations: Abdomen is soft.      Tenderness: There is abdominal tenderness. There is left CVA tenderness. There is no right CVA tenderness or guarding.   Musculoskeletal:         General: Normal range of motion.      Cervical back: Normal range of motion and neck supple.   Skin:     General: Skin is warm and dry.   Neurological:      General: No focal deficit present.      Mental Status: She is alert and oriented to person, place, and time. Mental status is at baseline.   Psychiatric:         Mood and Affect: Mood normal.         Behavior: Behavior normal.         Thought Content: Thought content normal.         Judgment: Judgment normal.          Results   No results found for: \"PSA\"  Lab Results   Component Value Date    CALCIUM 9.1 02/20/2025    K 3.8 02/20/2025    CO2 23 02/20/2025     (H) 02/20/2025    BUN 13 02/20/2025    CREATININE 1.07 02/20/2025     Lab Results   Component Value Date    WBC 12.26 (H) 02/20/2025    HGB 15.0 02/20/2025    HCT 46.1 02/20/2025    MCV 85 02/20/2025     02/20/2025       Office Urine Dip  No results found for this or any previous visit (from the past hour).      "

## 2025-03-16 NOTE — ASSESSMENT & PLAN NOTE
CT A&P-- 5mm obstructing stone at right UVJ. Mild right hydronephrosis, perinephric and periureteral stranding.  KUB no identified calculi  Persistent flank pain, nausea  Continue irritative voiding diet  Low oxalate kidney stone diet, add lemon daily  Ct renal stone protocol this week.  Follow-up 2-3 weeks for CT results.  Will call if retained stone         We also discussed the passage of her kidney stone and its analysis including hydroxyapatite.  However, she feels much bother and feels as if stone is still present.  Which can form in alkaline urine recommend adequate hydration with water to produce pale yellow urine, low oxalate kidney stone diet adding lemon to her diet on a daily basis to aid in acidifying the urine.    Patient with persisten pain, nausea, continues to use Topamax.  No dysuria, resolved hematuria.  Risks benefits of retained stone discussed.  At this time, we will schedule ct stone protocol, she will continue flomax. Hydration with water, strain all urine.  Ed precautions were reviewed.  Follow-up in 2 weeks with any provider.  She understands we will call her with results.  We did review cystoscopy, lithotripsy along with risks, benefits, goals of intervention.  If shows retained stone, she is agreeable to move forward with surgery.      IC not flaring up at this time, will continue to delay previously scheduled cysto if not bother some.  She understands if it remains under behavioral and dietary control, she does not have to pursue this route of testing.

## 2025-03-18 ENCOUNTER — TELEPHONE (OUTPATIENT)
Dept: UROLOGY | Facility: CLINIC | Age: 40
End: 2025-03-18

## 2025-03-18 NOTE — TELEPHONE ENCOUNTER
Left detailed message for patient to get her KUB xray prior to her visit on Monday 03/24/2025 and to call office with any other questions or concerns.

## 2025-03-19 ENCOUNTER — HOSPITAL ENCOUNTER (OUTPATIENT)
Dept: GASTROENTEROLOGY | Facility: MEDICAL CENTER | Age: 40
Setting detail: OUTPATIENT SURGERY
Discharge: HOME | End: 2025-03-19
Payer: COMMERCIAL

## 2025-03-19 ENCOUNTER — ANESTHESIA EVENT (OUTPATIENT)
Dept: GASTROENTEROLOGY | Facility: MEDICAL CENTER | Age: 40
End: 2025-03-19
Payer: COMMERCIAL

## 2025-03-19 ENCOUNTER — ANESTHESIA (OUTPATIENT)
Dept: GASTROENTEROLOGY | Facility: MEDICAL CENTER | Age: 40
End: 2025-03-19
Payer: COMMERCIAL

## 2025-03-19 VITALS
BODY MASS INDEX: 29.68 KG/M2 | HEART RATE: 74 BPM | RESPIRATION RATE: 20 BRPM | SYSTOLIC BLOOD PRESSURE: 109 MMHG | DIASTOLIC BLOOD PRESSURE: 67 MMHG | HEIGHT: 71 IN | WEIGHT: 212 LBS | TEMPERATURE: 97.5 F | OXYGEN SATURATION: 100 %

## 2025-03-19 DIAGNOSIS — R11.0 NAUSEA: ICD-10-CM

## 2025-03-19 DIAGNOSIS — K21.9 GASTROESOPHAGEAL REFLUX DISEASE, UNSPECIFIED WHETHER ESOPHAGITIS PRESENT: ICD-10-CM

## 2025-03-19 DIAGNOSIS — K29.70 GASTRITIS WITHOUT BLEEDING, UNSPECIFIED CHRONICITY, UNSPECIFIED GASTRITIS TYPE: Primary | ICD-10-CM

## 2025-03-19 DIAGNOSIS — R93.5 ABNORMAL CT OF THE ABDOMEN: ICD-10-CM

## 2025-03-19 DIAGNOSIS — K52.9 ENTEROCOLITIS: ICD-10-CM

## 2025-03-19 LAB
EXT PREGNANCY TEST URINE: NEGATIVE
EXT. CONTROL: NORMAL

## 2025-03-19 PROCEDURE — 43239 EGD BIOPSY SINGLE/MULTIPLE: CPT | Performed by: INTERNAL MEDICINE

## 2025-03-19 PROCEDURE — 81025 URINE PREGNANCY TEST: CPT | Performed by: ANESTHESIOLOGY

## 2025-03-19 PROCEDURE — 88305 TISSUE EXAM BY PATHOLOGIST: CPT | Performed by: SPECIALIST

## 2025-03-19 PROCEDURE — 45380 COLONOSCOPY AND BIOPSY: CPT | Performed by: INTERNAL MEDICINE

## 2025-03-19 RX ORDER — OMEPRAZOLE 20 MG/1
20 CAPSULE, DELAYED RELEASE ORAL
Qty: 90 CAPSULE | Refills: 1 | Status: SHIPPED | OUTPATIENT
Start: 2025-03-19

## 2025-03-19 RX ORDER — SODIUM CHLORIDE, SODIUM LACTATE, POTASSIUM CHLORIDE, CALCIUM CHLORIDE 600; 310; 30; 20 MG/100ML; MG/100ML; MG/100ML; MG/100ML
125 INJECTION, SOLUTION INTRAVENOUS CONTINUOUS
Status: DISCONTINUED | OUTPATIENT
Start: 2025-03-19 | End: 2025-03-23 | Stop reason: HOSPADM

## 2025-03-19 RX ORDER — PROPOFOL 10 MG/ML
INJECTION, EMULSION INTRAVENOUS AS NEEDED
Status: DISCONTINUED | OUTPATIENT
Start: 2025-03-19 | End: 2025-03-19

## 2025-03-19 RX ADMIN — PROPOFOL 50 MG: 10 INJECTION, EMULSION INTRAVENOUS at 08:13

## 2025-03-19 RX ADMIN — Medication 40 MG: at 08:27

## 2025-03-19 RX ADMIN — PROPOFOL 150 MG: 10 INJECTION, EMULSION INTRAVENOUS at 08:11

## 2025-03-19 RX ADMIN — PROPOFOL 50 MG: 10 INJECTION, EMULSION INTRAVENOUS at 08:18

## 2025-03-19 RX ADMIN — PROPOFOL 50 MG: 10 INJECTION, EMULSION INTRAVENOUS at 08:24

## 2025-03-19 RX ADMIN — PROPOFOL 70 MG: 10 INJECTION, EMULSION INTRAVENOUS at 08:16

## 2025-03-19 RX ADMIN — PROPOFOL 50 MG: 10 INJECTION, EMULSION INTRAVENOUS at 08:33

## 2025-03-19 RX ADMIN — PROPOFOL 80 MG: 10 INJECTION, EMULSION INTRAVENOUS at 08:20

## 2025-03-19 RX ADMIN — SODIUM CHLORIDE, SODIUM LACTATE, POTASSIUM CHLORIDE, AND CALCIUM CHLORIDE 125 ML/HR: .6; .31; .03; .02 INJECTION, SOLUTION INTRAVENOUS at 08:05

## 2025-03-19 NOTE — ANESTHESIA POSTPROCEDURE EVALUATION
Post-Op Assessment Note    CV Status:  Stable  Pain Score: 0    Pain management: adequate       Mental Status:  Alert   Hydration Status:  Stable   PONV Controlled:  None   Airway Patency:  Patent     Post Op Vitals Reviewed: Yes    No anethesia notable event occurred.    Staff: CRNA       Last Filed PACU Vitals:  Vitals Value Taken Time   Temp     Pulse 76 03/19/25 0842   /65 03/19/25 0842   Resp 16 03/19/25 0842   SpO2 100 % 03/19/25 0842       Modified Rafaela:     Vitals Value Taken Time   Activity 2 03/19/25 0844   Respiration 2 03/19/25 0844   Circulation 2 03/19/25 0844   Consciousness 1 03/19/25 0844   Oxygen Saturation 2 03/19/25 0844     Modified Rafaela Score: 9

## 2025-03-19 NOTE — H&P
H&P - Gastroenterology   Name: Estrellita Lucero 39 y.o. female I MRN: [id number]  Unit/Bed#:  I Date of Admission: 3/19/2025   Date of Service: 3/19/2025 I Hospital Day: 0     Assessment & Plan   This is a 39 y.o. year old female here for egd/colonoscopy, and she is stable and optimized for her procedure.    History of Present Illness    Estrellita Lucero is a 39 y.o. year old female who presents for nausea, abdominal pain, change in bowel habits, elevated fecal calprotectin    REVIEW OF SYSTEMS: Per the HPI, and otherwise unremarkable.    Historical Information   Past Medical History:   Diagnosis Date    Asthma     Depression     Fatty liver     GERD (gastroesophageal reflux disease)     Hernia     Hypertension     IC (interstitial cystitis)     Kidney stone 1/25/2025    Migraines     PCOS (polycystic ovarian syndrome)     Pulmonary embolism (HCC) 2009    Urinary retention     Urinary tract infection     Venous insufficiency      Past Surgical History:   Procedure Laterality Date    ADENOIDECTOMY      CHOLECYSTECTOMY      COLONOSCOPY  2021    CYSTOSCOPY      HERNIA REPAIR      LAPAROSCOPY      TONSILLECTOMY      UPPER GASTROINTESTINAL ENDOSCOPY  2021 & 2022     Social History     Tobacco Use    Smoking status: Never    Smokeless tobacco: Never   Vaping Use    Vaping status: Never Used   Substance and Sexual Activity    Alcohol use: Not Currently     Comment: May have a drink once every 5-6 months    Drug use: Never    Sexual activity: Not Currently     Partners: Male     Birth control/protection: I.U.D.     E-Cigarette/Vaping    E-Cigarette Use Never User      E-Cigarette/Vaping Substances    Nicotine No     THC No     CBD No     Flavoring No     Other No     Unknown No      Family history non-contributory    Meds/Allergies     Current Outpatient Medications:     acetaminophen (TYLENOL) 325 mg tablet    albuterol (PROVENTIL HFA,VENTOLIN HFA) 90 mcg/act inhaler    amitriptyline (ELAVIL) 50 mg tablet    busPIRone  (BUSPAR) 5 mg tablet    famotidine (PEPCID) 40 MG tablet    hydrOXYzine HCL (ATARAX) 10 mg tablet    ibuprofen (MOTRIN) 600 mg tablet    lisinopril (ZESTRIL) 20 mg tablet    loratadine-pseudoephedrine (CLARITIN-D 24-HOUR)  mg per 24 hr tablet    metFORMIN (GLUCOPHAGE) 1000 MG tablet    Meth-Hyo-M Bl-Na Phos-Ph Sal (Urelle) 81 MG TABS    montelukast (SINGULAIR) 10 mg tablet    tamsulosin (FLOMAX) 0.4 mg    topiramate (TOPAMAX) 100 mg tablet    polyethylene glycol (GaviLyte-C) 4000 mL solution    Wegovy 2.4 MG/0.75ML    Current Facility-Administered Medications:     lactated ringers infusion, 125 mL/hr, Intravenous, Continuous  Allergies   Allergen Reactions    Erythromycin Swelling    Ethylenediamine Dermatitis, Hives, Itching and Swelling    Propolis Dermatitis, Hives, Itching, Swelling and Rash    Sumatriptan Chest Pain and Palpitations     to all TRIPTANS    Triptans Palpitations     flushing       Objective :       Physical Exam  Gen: NAD  Head: NCAT  CV: RRR  CHEST: Clear  ABD: soft, NT/ND  EXT: no edema

## 2025-03-19 NOTE — ANESTHESIA PREPROCEDURE EVALUATION
Procedure:  COLONOSCOPY  EGD    Relevant Problems   CARDIO   (+) Chronic hypertension affecting pregnancy   (+) Migraine      GI/HEPATIC   (+) GERD (gastroesophageal reflux disease)      /RENAL   (+) Renal calculi      NEURO/PSYCH   (+) Migraine      PULMONARY   (+) Asthma   (+) Mild intermittent asthma without complication      Obstetrics/Gynecology   (+) PCOS (polycystic ovarian syndrome)      Rheumatology   (+) Interstitial cystitis      Other   (+) History of pulmonary embolism        Physical Exam    Airway    Mallampati score: II  TM Distance: >3 FB       Dental   No notable dental hx     Cardiovascular  Cardiovascular exam normal    Pulmonary  Pulmonary exam normal     Other Findings  post-pubertal.      Anesthesia Plan  ASA Score- 2     Anesthesia Type- IV sedation with anesthesia with ASA Monitors.         Additional Monitors:     Airway Plan:            Plan Factors-Exercise tolerance (METS): >4 METS.    Chart reviewed.        Patient is not a current smoker.              Induction- intravenous.    Postoperative Plan-         Informed Consent- Anesthetic plan and risks discussed with patient.        NPO Status:  No vitals data found for the desired time range.

## 2025-03-19 NOTE — ANESTHESIA POSTPROCEDURE EVALUATION
Post-Op Assessment Note    CV Status:  Stable    Pain management: adequate       Mental Status:  Alert and awake   Hydration Status:  Euvolemic   PONV Controlled:  Controlled   Airway Patency:  Patent     Post Op Vitals Reviewed: Yes    No anethesia notable event occurred.    Staff: Anesthesiologist           Last Filed PACU Vitals:  Vitals Value Taken Time   Temp     Pulse 74 03/19/25 0856   /67 03/19/25 0856   Resp 20 03/19/25 0856   SpO2 100 % 03/19/25 0856       Modified Rafaela:     Vitals Value Taken Time   Activity 2 03/19/25 0904   Respiration 2 03/19/25 0904   Circulation 2 03/19/25 0904   Consciousness 2 03/19/25 0904   Oxygen Saturation 2 03/19/25 0904     Modified Rafaela Score: 10

## 2025-03-24 ENCOUNTER — HOSPITAL ENCOUNTER (OUTPATIENT)
Dept: RADIOLOGY | Facility: HOSPITAL | Age: 40
Discharge: HOME | End: 2025-03-24
Payer: COMMERCIAL

## 2025-03-24 ENCOUNTER — OFFICE VISIT (OUTPATIENT)
Dept: UROLOGY | Facility: CLINIC | Age: 40
End: 2025-03-24
Payer: COMMERCIAL

## 2025-03-24 ENCOUNTER — RESULTS FOLLOW-UP (OUTPATIENT)
Dept: UROLOGY | Facility: CLINIC | Age: 40
End: 2025-03-24

## 2025-03-24 VITALS
TEMPERATURE: 97.6 F | HEART RATE: 83 BPM | SYSTOLIC BLOOD PRESSURE: 134 MMHG | HEIGHT: 71 IN | WEIGHT: 213.4 LBS | DIASTOLIC BLOOD PRESSURE: 76 MMHG | BODY MASS INDEX: 29.88 KG/M2 | OXYGEN SATURATION: 99 %

## 2025-03-24 DIAGNOSIS — R10.9 FLANK PAIN: ICD-10-CM

## 2025-03-24 DIAGNOSIS — N30.10 INTERSTITIAL CYSTITIS: ICD-10-CM

## 2025-03-24 DIAGNOSIS — N20.1 RIGHT URETERAL CALCULUS: Primary | ICD-10-CM

## 2025-03-24 DIAGNOSIS — N20.1 RIGHT URETERAL CALCULUS: ICD-10-CM

## 2025-03-24 PROCEDURE — 99213 OFFICE O/P EST LOW 20 MIN: CPT

## 2025-03-24 PROCEDURE — 74018 RADEX ABDOMEN 1 VIEW: CPT

## 2025-03-26 ENCOUNTER — RESULTS FOLLOW-UP (OUTPATIENT)
Dept: GASTROENTEROLOGY | Facility: MEDICAL CENTER | Age: 40
End: 2025-03-26

## 2025-03-26 ENCOUNTER — TELEPHONE (OUTPATIENT)
Dept: GASTROENTEROLOGY | Facility: MEDICAL CENTER | Age: 40
End: 2025-03-26

## 2025-03-26 PROCEDURE — 88305 TISSUE EXAM BY PATHOLOGIST: CPT | Performed by: SPECIALIST

## 2025-03-26 NOTE — TELEPHONE ENCOUNTER
Spoke with patient-rescheduled to an urgent slot per Jennifer's request due to results not being received yet from procedures

## 2025-03-28 ENCOUNTER — HOSPITAL ENCOUNTER (OUTPATIENT)
Dept: CT IMAGING | Facility: HOSPITAL | Age: 40
End: 2025-03-28
Payer: COMMERCIAL

## 2025-03-28 DIAGNOSIS — R10.9 FLANK PAIN: ICD-10-CM

## 2025-03-28 PROCEDURE — 74176 CT ABD & PELVIS W/O CONTRAST: CPT

## 2025-03-31 ENCOUNTER — RESULTS FOLLOW-UP (OUTPATIENT)
Dept: OTHER | Facility: HOSPITAL | Age: 40
End: 2025-03-31

## 2025-03-31 NOTE — TELEPHONE ENCOUNTER
----- Message from MALACHI Tejada sent at 3/31/2025 10:16 AM EDT -----  Please let patient know her CT scan is negative for kidney stones.  It also shows the swelling of her right kidney has also resolved.  She should continue to follow her irritative voiding, IC diet and hydrate well

## 2025-03-31 NOTE — TELEPHONE ENCOUNTER
Patient returned call to the office and informed her of AP's note. Patient is questioning if she will need to keep her scheduled appointment for 4/8/25. Also states that in her x-ray that was done on 3/24, it notes that she has small blood clots in her pelvis. Reports having a history of blood clots in her chest as well as her legs. She is asking if this is a concern and who she may need to be referred to for this. Please review and advise.

## 2025-04-08 ENCOUNTER — OFFICE VISIT (OUTPATIENT)
Dept: UROLOGY | Facility: CLINIC | Age: 40
End: 2025-04-08
Payer: COMMERCIAL

## 2025-04-08 VITALS
BODY MASS INDEX: 29.9 KG/M2 | SYSTOLIC BLOOD PRESSURE: 112 MMHG | HEART RATE: 83 BPM | HEIGHT: 71 IN | OXYGEN SATURATION: 99 % | DIASTOLIC BLOOD PRESSURE: 76 MMHG | WEIGHT: 213.6 LBS | TEMPERATURE: 98.3 F

## 2025-04-08 DIAGNOSIS — N20.1 RIGHT URETERAL CALCULUS: Primary | ICD-10-CM

## 2025-04-08 DIAGNOSIS — N30.10 INTERSTITIAL CYSTITIS: ICD-10-CM

## 2025-04-08 PROCEDURE — 99213 OFFICE O/P EST LOW 20 MIN: CPT | Performed by: UROLOGY

## 2025-04-08 NOTE — PROGRESS NOTES
UROLOGY PROGRESS NOTE         NAME: Estrellita Lucero  AGE: 39 y.o. SEX: female  : 1985   MRN: [id number]    DATE: 2025  TIME: 3:38 PM    Assessment and Plan      Impression:   1. Right ureteral calculus  2. Interstitial cystitis  Stones are gone.  Interstitial cystitis currently being treated at [facility] DMSO treatments.  She hydrates well and medication that she is on seem to help.     Plan: She will follow-up with us on appearing basis.  She will continue to hydrate well.      Chief Complaint     Chief Complaint   Patient presents with    Follow-up     CT available     History of Present Illness     HPI: Estrellita Lucero is a 39 y.o. year old female who presents with history of a right ureteral calculus that is now gone.  She has interstitial cystitis.  Treated by Dr. Garcia Ness at [facility].  She proceeded DMSO treatment last week.  This seems to help her and she is going to continue to get her care with her.  She is on multiple dietary supplements DMSO treatments, hydroxyzine and Elavil.  And she hydrates well as mentioned above.  She avoids foods that bother her as well.  She is content with her current care.  It might be that she is making her stones related to the Topamax however she does need this for her migraines.              The following portions of the patient's history were reviewed and updated as appropriate: allergies, current medications, past family history, past medical history, past social history, past surgical history and problem list.  Past Medical History:   Diagnosis Date    Asthma     Depression     Fatty liver     GERD (gastroesophageal reflux disease)     Hernia     Hypertension     IC (interstitial cystitis)     Kidney stone 2025    Migraines     PCOS (polycystic ovarian syndrome)     Pulmonary embolism (HCC) 2009    Urinary retention     Urinary tract infection     Venous insufficiency      Past Surgical History:   Procedure Laterality  "Date    ADENOIDECTOMY      CHOLECYSTECTOMY      COLONOSCOPY  2021    CYSTOSCOPY      HERNIA REPAIR      LAPAROSCOPY      TONSILLECTOMY      UPPER GASTROINTESTINAL ENDOSCOPY  2021 & 2022     shoulder  Review of Systems     Const: Denies chills, fever and weight loss.  CV: Denies chest pain.  Resp: Denies SOB.  GI: Denies abdominal pain, nausea and vomiting.  : Denies symptoms other than stated above.  Musculo: Denies back pain.    Objective   /76   Pulse 83   Temp 98.3 °F (36.8 °C)   Ht 5' 11\" (1.803 m)   Wt 96.9 kg (213 lb 9.6 oz)   SpO2 99%   BMI 29.79 kg/m²     Physical Exam  Const: Appears healthy and well developed. No signs of acute distress present.  Resp: Respirations are regular and unlabored.   CV: Rate is regular. Rhythm is regular.  Abdomen: Abdomen is soft, nontender, and nondistended. Kidneys are not palpable.  :   Psych: Patient's attitude is cooperative. Mood is normal. Affect is normal.    Current Medications     Current Outpatient Medications:     acetaminophen (TYLENOL) 325 mg tablet, Take 2 tablets (650 mg total) by mouth every 6 (six) hours as needed for mild pain, Disp: 30 tablet, Rfl: 0    albuterol (PROVENTIL HFA,VENTOLIN HFA) 90 mcg/act inhaler, TAKE 2 PUFFS BY MOUTH EVERY 6 HOURS AS NEEDED FOR WHEEZE OR FOR SHORTNESS OF BREATH, Disp: , Rfl:     amitriptyline (ELAVIL) 50 mg tablet, take 1 tablet by mouth every day at night, Disp: , Rfl:     busPIRone (BUSPAR) 5 mg tablet, Take 5 mg by mouth 2 (two) times a day, Disp: , Rfl:     famotidine (PEPCID) 40 MG tablet, TAKE 1 TABLET BY MOUTH DAILY AT BEDTIME, Disp: 90 tablet, Rfl: 1    hydrOXYzine HCL (ATARAX) 10 mg tablet, Take 10 mg by mouth daily at bedtime, Disp: , Rfl:     ibuprofen (MOTRIN) 600 mg tablet, Take 1 tablet (600 mg total) by mouth every 8 (eight) hours as needed for moderate pain, Disp: 30 tablet, Rfl: 0    lisinopril (ZESTRIL) 20 mg tablet, Take 20 mg by mouth daily, Disp: , Rfl:     loratadine-pseudoephedrine " (CLARITIN-D 24-HOUR)  mg per 24 hr tablet, Take 1 tablet by mouth daily, Disp: , Rfl:     metFORMIN (GLUCOPHAGE) 1000 MG tablet, Take 1,000 mg by mouth daily with breakfast, Disp: , Rfl:     Meth-Hyo-M Bl-Na Phos-Ph Sal (Urelle) 81 MG TABS, Take 1 tablet by mouth if needed, Disp: , Rfl:     montelukast (SINGULAIR) 10 mg tablet, Take 10 mg by mouth daily, Disp: , Rfl:     omeprazole (PriLOSEC) 20 mg delayed release capsule, Take 1 capsule (20 mg total) by mouth daily before breakfast, Disp: 90 capsule, Rfl: 1    topiramate (TOPAMAX) 100 mg tablet, TAKE 1 TABLET BY MOUTH TWICE A DAY, Disp: 180 tablet, Rfl: 4    Wegovy 2.4 MG/0.75ML, INJECT 2.4 MG UNDER THE SKIN EVERY 7 DAYS., Disp: , Rfl:     tamsulosin (FLOMAX) 0.4 mg, TAKE 1 CAPSULE (0.4 MG TOTAL) BY MOUTH DAILY WITH DINNER FOR 28 DAYS (Patient not taking: Reported on 4/8/2025), Disp: 28 capsule, Rfl: 0        Frank D'Amico, MD

## 2025-04-09 ENCOUNTER — OFFICE VISIT (OUTPATIENT)
Dept: GASTROENTEROLOGY | Facility: MEDICAL CENTER | Age: 40
End: 2025-04-09
Payer: COMMERCIAL

## 2025-04-09 VITALS
DIASTOLIC BLOOD PRESSURE: 84 MMHG | HEIGHT: 71 IN | TEMPERATURE: 98.4 F | HEART RATE: 84 BPM | BODY MASS INDEX: 29.81 KG/M2 | SYSTOLIC BLOOD PRESSURE: 120 MMHG | OXYGEN SATURATION: 98 % | WEIGHT: 212.9 LBS

## 2025-04-09 DIAGNOSIS — K21.9 GERD WITHOUT ESOPHAGITIS: ICD-10-CM

## 2025-04-09 DIAGNOSIS — K59.09 CHRONIC CONSTIPATION: Primary | ICD-10-CM

## 2025-04-09 PROCEDURE — 99213 OFFICE O/P EST LOW 20 MIN: CPT

## 2025-04-09 RX ORDER — LINACLOTIDE 145 UG/1
145 CAPSULE, GELATIN COATED ORAL DAILY
Qty: 30 CAPSULE | Refills: 3 | Status: SHIPPED | OUTPATIENT
Start: 2025-04-09

## 2025-04-09 NOTE — PROGRESS NOTES
Name: Estrellita Lucero      : 1985      MRN: [id number]  Encounter Provider: Breanna Casas PA-C  Encounter Date: 2025   [facility]ounter [facility]  :  Assessment & Plan  Chronic constipation  Patient with alternating constipation and diarrhea for many years.  Recently has had predominantly constipation.  She had abdominal x-ray that showed moderate right-sided stool burden.  Colonoscopy was unremarkable.  Suspect patient having constipation with overflow diarrhea.  Had no improvement with over-the-counter medications, will try Linzess 145 mcg.  Stop all other stool softeners/laxatives while trying. OK to continue metamucil/fiber supplement. Warned that this may cause initial looser stools/diarrhea but I encouraged to take consistently daily x2 weeks to see effect.   Recommend Benefiber OTC once daily, high fiber diet, increased water intake, and activity as tolerated to prevent/avoid constipation.   Orders:    linaCLOtide (Linzess) 145 MCG CAPS; Take 1 capsule (145 mcg total) by mouth in the morning    GERD without esophagitis  Patient with long history of heartburn, recent EGD with grade 2 cm hiatal hernia and moderate erythematous mucosa in the body of the stomach.  She has been doing better since taking omeprazole 20 mg daily and famotidine 40 mg at bedtime.  Continue with current regimen.         Follow-up 3 months    History of Present Illness   HPI  Estrellita Lucero is a 39 y.o. female with PMH of asthma, GERD, PCOS, diabetes, HTN who presents for constipation.  Patient was last seen by Dr. Jaiyeola 2025 for acid reflux, nausea, bloating, decreased appetite.  Patient also with alternating bowel habits at that time.  Plan to check stool studies, x-ray, celiac serologies, EGD, colonoscopy, start famotidine 40 mg daily.    Patient reports heartburn has been better controlled on omeprazole 20 mg daily and famotidine 40 mg at bedtime.  She still has  occasional symptoms with this.  She has ongoing issues with her bowels.  She states in the past she has had alternating constipation and diarrhea but recently has been predominantly constipation.  She has been using MiraLAX with minimal improvement in symptoms.  Tries to drink plenty of water and eating high-fiber diet.  Has also been dealing with kidney stones and interstitial cystitis recently.  She is following with urology and urogynecology for this.  Denies unintentional weight loss, nausea, vomiting, dysphagia, odynophagia, melena, hematochezia.    Negative for celiac disease, fecal calprotectin 130  Stool studies negative    Prior imaging/procedures:  CT renal stone study 3/28/2025: Nonvisualization of previously seen right ureterovesicular junction calculus with resolution of right hydroureteronephrosis   EGD 3/2025: 2 cm hiatal hernia, moderate erythematous mucosa in the body of the stomach (stomach biopsies with chronic inactive gastritis, negative for H. pylori, unremarkable duodenal biopsies)  Colonoscopy 3/2025: Normal colon and TI, small internal hemorrhoids (unremarkable colon biopsies)  Abdominal x-ray 2/12/2025: Moderate right-sided stool burden    Review of Systems   Constitutional:  Negative for appetite change, chills and fever.   Respiratory:  Negative for shortness of breath.    Gastrointestinal:  Positive for constipation. Negative for abdominal pain, blood in stool, diarrhea, nausea and vomiting.     Medical History Reviewed by provider this encounter:     .  Current Outpatient Medications on File Prior to Visit   Medication Sig Dispense Refill    acetaminophen (TYLENOL) 325 mg tablet Take 2 tablets (650 mg total) by mouth every 6 (six) hours as needed for mild pain 30 tablet 0    albuterol (PROVENTIL HFA,VENTOLIN HFA) 90 mcg/act inhaler TAKE 2 PUFFS BY MOUTH EVERY 6 HOURS AS NEEDED FOR WHEEZE OR FOR SHORTNESS OF BREATH      amitriptyline (ELAVIL) 50 mg tablet take 1 tablet by mouth every day  "at night      busPIRone (BUSPAR) 5 mg tablet Take 5 mg by mouth 2 (two) times a day      famotidine (PEPCID) 40 MG tablet TAKE 1 TABLET BY MOUTH DAILY AT BEDTIME 90 tablet 1    hydrOXYzine HCL (ATARAX) 10 mg tablet Take 10 mg by mouth daily at bedtime      ibuprofen (MOTRIN) 600 mg tablet Take 1 tablet (600 mg total) by mouth every 8 (eight) hours as needed for moderate pain 30 tablet 0    lisinopril (ZESTRIL) 20 mg tablet Take 20 mg by mouth daily      loratadine-pseudoephedrine (CLARITIN-D 24-HOUR)  mg per 24 hr tablet Take 1 tablet by mouth daily      metFORMIN (GLUCOPHAGE) 1000 MG tablet Take 1,000 mg by mouth daily with breakfast      Meth-Hyo-M Bl-Na Phos-Ph Sal (Urelle) 81 MG TABS Take 1 tablet by mouth if needed      montelukast (SINGULAIR) 10 mg tablet Take 10 mg by mouth daily      omeprazole (PriLOSEC) 20 mg delayed release capsule Take 1 capsule (20 mg total) by mouth daily before breakfast 90 capsule 1    topiramate (TOPAMAX) 100 mg tablet TAKE 1 TABLET BY MOUTH TWICE A  tablet 4    Wegovy 2.4 MG/0.75ML INJECT 2.4 MG UNDER THE SKIN EVERY 7 DAYS.      tamsulosin (FLOMAX) 0.4 mg TAKE 1 CAPSULE (0.4 MG TOTAL) BY MOUTH DAILY WITH DINNER FOR 28 DAYS (Patient not taking: Reported on 4/9/2025) 28 capsule 0     No current facility-administered medications on file prior to visit.      Social History     Tobacco Use    Smoking status: Never    Smokeless tobacco: Never   Vaping Use    Vaping status: Never Used   Substance and Sexual Activity    Alcohol use: Not Currently     Comment: May have a drink once every 5-6 months    Drug use: Never    Sexual activity: Not Currently     Partners: Male     Birth control/protection: I.U.D.        Objective   /84 (Patient Position: Sitting, Cuff Size: Large)   Pulse 84   Temp 98.4 °F (36.9 °C) (Tympanic)   Ht 5' 11\" (1.803 m)   Wt 96.6 kg (212 lb 14.4 oz)   SpO2 98%   BMI 29.69 kg/m²      Physical Exam  Vitals reviewed.   Constitutional:       " General: She is not in acute distress.     Appearance: She is not ill-appearing.   HENT:      Head: Normocephalic and atraumatic.   Cardiovascular:      Rate and Rhythm: Normal rate and regular rhythm.   Pulmonary:      Effort: Pulmonary effort is normal.      Breath sounds: Normal breath sounds.   Abdominal:      General: Abdomen is flat. Bowel sounds are normal. There is no distension.      Palpations: Abdomen is soft.      Tenderness: There is no abdominal tenderness.   Skin:     General: Skin is warm and dry.   Neurological:      Mental Status: She is alert. Mental status is at baseline.

## 2025-04-17 ENCOUNTER — TELEPHONE (OUTPATIENT)
Age: 40
End: 2025-04-17

## 2025-04-17 NOTE — TELEPHONE ENCOUNTER
Rcvd fax  No pa needed for linzess  Scanned in media    This medication is a possible high dollar medication. The patient has not been contacted regarding the decision as the office clinical team will handle advising the patient and if/any patient assistance that may have been started.  Thank you.

## 2025-04-17 NOTE — TELEPHONE ENCOUNTER
PA for linzess 145 mcg    SUBMITTED to [employer]    via    [x]CMM-KEY: (Key: [id number])  PA Case ID #: [id number]    [x]PA sent as URGENT    All office notes, labs and other pertaining documents and studies sent. Clinical questions answered. Awaiting determination from insurance company.     Turnaround time for your insurance to make a decision on your Prior Authorization can take 7-21 business days.

## 2025-04-21 NOTE — TELEPHONE ENCOUNTER
Patient called to check on status of PA advised that no auth was required for this medication she will reach out to [facility] to see what the mix up is

## 2025-06-11 DIAGNOSIS — K29.70 GASTRITIS WITHOUT BLEEDING, UNSPECIFIED CHRONICITY, UNSPECIFIED GASTRITIS TYPE: ICD-10-CM

## 2025-06-11 DIAGNOSIS — K21.9 GASTROESOPHAGEAL REFLUX DISEASE, UNSPECIFIED WHETHER ESOPHAGITIS PRESENT: ICD-10-CM

## 2025-06-11 DIAGNOSIS — K58.2 IRRITABLE BOWEL SYNDROME WITH ALTERNATING BOWEL HABITS: ICD-10-CM

## 2025-06-11 DIAGNOSIS — R11.0 NAUSEA: ICD-10-CM

## 2025-06-11 RX ORDER — FAMOTIDINE 40 MG/1
40 TABLET, FILM COATED ORAL
Qty: 90 TABLET | Refills: 1 | Status: SHIPPED | OUTPATIENT
Start: 2025-06-11

## 2025-06-11 RX ORDER — OMEPRAZOLE 20 MG/1
20 CAPSULE, DELAYED RELEASE ORAL
Qty: 90 CAPSULE | Refills: 2 | Status: SHIPPED | OUTPATIENT
Start: 2025-06-11

## 2025-07-09 ENCOUNTER — OFFICE VISIT (OUTPATIENT)
Dept: GASTROENTEROLOGY | Facility: MEDICAL CENTER | Age: 40
End: 2025-07-09
Payer: COMMERCIAL

## 2025-07-09 VITALS
BODY MASS INDEX: 28.7 KG/M2 | DIASTOLIC BLOOD PRESSURE: 85 MMHG | HEART RATE: 89 BPM | WEIGHT: 205.8 LBS | SYSTOLIC BLOOD PRESSURE: 122 MMHG | TEMPERATURE: 96.3 F

## 2025-07-09 DIAGNOSIS — K21.9 GERD WITHOUT ESOPHAGITIS: Primary | ICD-10-CM

## 2025-07-09 DIAGNOSIS — K59.09 CHRONIC CONSTIPATION: ICD-10-CM

## 2025-07-09 PROCEDURE — 99214 OFFICE O/P EST MOD 30 MIN: CPT

## 2025-07-09 RX ORDER — LINACLOTIDE 145 UG/1
145 CAPSULE, GELATIN COATED ORAL DAILY
Qty: 90 CAPSULE | Refills: 3 | Status: SHIPPED | OUTPATIENT
Start: 2025-07-09

## 2025-07-09 RX ORDER — FAMOTIDINE 40 MG/1
40 TABLET, FILM COATED ORAL
Qty: 90 TABLET | Refills: 3 | Status: SHIPPED | OUTPATIENT
Start: 2025-07-09

## 2025-07-09 RX ORDER — OMEPRAZOLE 40 MG/1
40 CAPSULE, DELAYED RELEASE ORAL DAILY
Qty: 30 CAPSULE | Refills: 3 | Status: SHIPPED | OUTPATIENT
Start: 2025-07-09

## 2025-07-09 NOTE — PROGRESS NOTES
Name: Estrellita Lucero      : 1985      MRN: [id number]  Encounter Provider: Breanna Casas PA-C  Encounter Date: 2025   Encounter department: [facility] [place]  :  Assessment & Plan  GERD without esophagitis  Patient with long history of heartburn, previously controlled on omeprazole 20 mg daily and famotidine 40 mg at bedtime.  Recent EGD with 2 cm hiatal hernia and moderate erythematous mucosa in the body of the stomach.  Stomach biopsies negative for H. pylori.    Over the past few weeks she feels like her symptoms have worsened.  Increase to omeprazole 40 mg daily, continue famotidine 40 mg at bedtime.  Instructed patient to reach out in a few weeks with an update regarding symptoms.  If symptoms persist could consider EGD with Bravo study.  Orders:    omeprazole (PriLOSEC) 40 MG capsule; Take 1 capsule (40 mg total) by mouth daily    famotidine (PEPCID) 40 MG tablet; Take 1 tablet (40 mg total) by mouth daily at bedtime    Chronic constipation  Patient notes improvement in her bowels since starting Linzess.  Continue with Linzess 145 mcg daily.  Continue high fiber diet, increased water intake, and activity as tolerated to prevent/avoid constipation.   Orders:    linaCLOtide (Linzess) 145 MCG CAPS; Take 1 capsule (145 mcg total) by mouth in the morning      Follow-up 6 months    History of Present Illness   HPI  Estrellita Lucero is a 40 y.o. female with PMH of asthma, GERD, PCOS, diabetes, HTN who presents for follow-up.  I last saw the patient 2025 for constipation and GERD.  At that time patient stated heartburn is better controlled omeprazole 20 mg daily famotidine 40 mg at bedtime.  She stated she still had occasional symptoms with this.  At that time she was having more issues with her bowels and was having alternating constipation and diarrhea.  We plan to continue omeprazole, famotidine, start Linzess 145 mcg daily.    Today patient states she is  still having some issues with heartburn.  She feels like she often has to burp and feels like she has a lot of gas in her chest.  She sometimes feels like she has pressure in this area.  This is worsened over the past few weeks.  She is taking omeprazole 20 mg daily famotidine 40 mg at bedtime.    She states her bowels have been much more controlled.  She feels that the Linzess has significantly helped with this.  Denies unintentional weight loss, nausea, vomiting, abdominal pain, melena, hematochezia.  Her father was recently diagnosed with pancreatic cancer.  Her and her sister helping to take him to chemo.    Prior imaging/procedures:  CT renal stone study 3/28/2025: Nonvisualization of previously seen right ureterovesicular junction calculus with resolution of right hydroureteronephrosis   EGD 3/2025: 2 cm hiatal hernia, moderate erythematous mucosa in the body of the stomach (stomach biopsies with chronic inactive gastritis, negative for H. pylori, unremarkable duodenal biopsies)  Colonoscopy 3/2025: Normal colon and TI, small internal hemorrhoids (unremarkable colon biopsies)  Abdominal x-ray 2/12/2025: Moderate right-sided stool burden    Review of Systems   Constitutional:  Negative for appetite change, chills and fever.   Respiratory:  Negative for shortness of breath.    Gastrointestinal:  Negative for abdominal pain, blood in stool, constipation, diarrhea, nausea and vomiting.     Medical History Reviewed by provider this encounter:     .  Medications Ordered Prior to Encounter[1]   Social History[2]     Objective   /85   Pulse 89   Temp (!) 96.3 °F (35.7 °C)   Wt 93.4 kg (205 lb 12.8 oz)   BMI 28.70 kg/m²      Physical Exam  Vitals reviewed.   Constitutional:       General: She is not in acute distress.     Appearance: She is not ill-appearing.   HENT:      Head: Normocephalic and atraumatic.     Cardiovascular:      Rate and Rhythm: Normal rate and regular rhythm.   Pulmonary:      Effort:  Pulmonary effort is normal.      Breath sounds: Normal breath sounds.   Abdominal:      General: Abdomen is flat. Bowel sounds are normal. There is no distension.      Palpations: Abdomen is soft.      Tenderness: There is no abdominal tenderness.     Skin:     General: Skin is warm and dry.     Neurological:      Mental Status: She is alert. Mental status is at baseline.                [1]   Current Outpatient Medications on File Prior to Visit   Medication Sig Dispense Refill    acetaminophen (TYLENOL) 325 mg tablet Take 2 tablets (650 mg total) by mouth every 6 (six) hours as needed for mild pain 30 tablet 0    albuterol (PROVENTIL HFA,VENTOLIN HFA) 90 mcg/act inhaler       amitriptyline (ELAVIL) 50 mg tablet       busPIRone (BUSPAR) 5 mg tablet Take 5 mg by mouth in the morning and 5 mg in the evening.      hydrOXYzine HCL (ATARAX) 10 mg tablet Take 10 mg by mouth daily at bedtime      ibuprofen (MOTRIN) 600 mg tablet Take 1 tablet (600 mg total) by mouth every 8 (eight) hours as needed for moderate pain 30 tablet 0    lisinopril (ZESTRIL) 20 mg tablet Take 20 mg by mouth in the morning.      loratadine-pseudoephedrine (CLARITIN-D 24-HOUR)  mg per 24 hr tablet Take 1 tablet by mouth in the morning.      metFORMIN (GLUCOPHAGE) 1000 MG tablet Take 1,000 mg by mouth daily with breakfast      Meth-Hyo-M Bl-Na Phos-Ph Sal (Urelle) 81 MG TABS Take 1 tablet by mouth if needed      montelukast (SINGULAIR) 10 mg tablet Take 10 mg by mouth in the morning.      topiramate (TOPAMAX) 100 mg tablet TAKE 1 TABLET BY MOUTH TWICE A  tablet 4    Wegovy 2.4 MG/0.75ML       [DISCONTINUED] famotidine (PEPCID) 40 MG tablet TAKE 1 TABLET BY MOUTH EVERYDAY AT BEDTIME 90 tablet 1    [DISCONTINUED] linaCLOtide (Linzess) 145 MCG CAPS Take 1 capsule (145 mcg total) by mouth in the morning 30 capsule 3    [DISCONTINUED] omeprazole (PriLOSEC) 20 mg delayed release capsule TAKE 1 CAPSULE BY MOUTH DAILY BEFORE BREAKFAST. 90  capsule 2    tamsulosin (FLOMAX) 0.4 mg TAKE 1 CAPSULE (0.4 MG TOTAL) BY MOUTH DAILY WITH DINNER FOR 28 DAYS (Patient not taking: Reported on 4/9/2025) 28 capsule 0     No current facility-administered medications on file prior to visit.   [2]   Social History  Tobacco Use    Smoking status: Never    Smokeless tobacco: Never   Vaping Use    Vaping status: Never Used   Substance and Sexual Activity    Alcohol use: Not Currently     Comment: May have a drink once every 5-6 months    Drug use: Never    Sexual activity: Not Currently     Partners: Male     Birth control/protection: I.U.D.